# Patient Record
Sex: FEMALE | Race: WHITE | NOT HISPANIC OR LATINO | Employment: OTHER | URBAN - METROPOLITAN AREA
[De-identification: names, ages, dates, MRNs, and addresses within clinical notes are randomized per-mention and may not be internally consistent; named-entity substitution may affect disease eponyms.]

---

## 2017-03-06 ENCOUNTER — AMBULATORY - HEALTHEAST (OUTPATIENT)
Dept: PODIATRY | Facility: CLINIC | Age: 32
End: 2017-03-06

## 2017-03-06 DIAGNOSIS — L60.0 INGROWN TOENAIL: ICD-10-CM

## 2017-03-06 ASSESSMENT — MIFFLIN-ST. JEOR: SCORE: 1853.02

## 2018-02-26 ENCOUNTER — RECORDS - HEALTHEAST (OUTPATIENT)
Dept: LAB | Facility: CLINIC | Age: 33
End: 2018-02-26

## 2018-02-26 LAB — HCG SERPL-ACNC: 3976 MLU/ML (ref 0–4)

## 2018-04-16 ENCOUNTER — RECORDS - HEALTHEAST (OUTPATIENT)
Dept: ADMINISTRATIVE | Facility: OTHER | Age: 33
End: 2018-04-16

## 2018-04-16 ASSESSMENT — MIFFLIN-ST. JEOR: SCORE: 2040.79

## 2018-04-17 ENCOUNTER — ANESTHESIA - HEALTHEAST (OUTPATIENT)
Dept: SURGERY | Facility: CLINIC | Age: 33
End: 2018-04-17

## 2018-04-18 ENCOUNTER — SURGERY - HEALTHEAST (OUTPATIENT)
Dept: SURGERY | Facility: CLINIC | Age: 33
End: 2018-04-18

## 2018-04-18 ASSESSMENT — MIFFLIN-ST. JEOR: SCORE: 2040.79

## 2018-04-30 ENCOUNTER — TRANSFERRED RECORDS (OUTPATIENT)
Dept: HEALTH INFORMATION MANAGEMENT | Facility: CLINIC | Age: 33
End: 2018-04-30
Payer: COMMERCIAL

## 2018-07-09 ENCOUNTER — OFFICE VISIT - HEALTHEAST (OUTPATIENT)
Dept: FAMILY MEDICINE | Facility: CLINIC | Age: 33
End: 2018-07-09

## 2018-07-09 DIAGNOSIS — L60.0 INGROWN NAIL OF GREAT TOE OF RIGHT FOOT: ICD-10-CM

## 2018-07-09 ASSESSMENT — MIFFLIN-ST. JEOR: SCORE: 2024.46

## 2019-01-18 RX ORDER — LEVOTHYROXINE SODIUM 75 UG/1
75 TABLET ORAL DAILY
COMMUNITY
End: 2021-12-21

## 2019-01-20 ENCOUNTER — ANESTHESIA EVENT (OUTPATIENT)
Dept: SURGERY | Facility: CLINIC | Age: 34
End: 2019-01-20
Payer: COMMERCIAL

## 2019-01-20 NOTE — ANESTHESIA PREPROCEDURE EVALUATION
Anesthesia Pre-Procedure Evaluation    Patient: Cherie Montgomery   MRN: [id number] : 1985          Preoperative Diagnosis: endometrial polyp    Procedure(s):  HYSTEROSCOPY, POLYPECTOMY, POSSIBLE DILATION AND CURETTAGE (GUY NEPHSuitey MORCELLATOR AND FLUID MANAGEMENT)    Past Medical History:   Diagnosis Date     Anti-phospholipid antibody syndrome (H)      Dysmenorrhea      Obese      Past Surgical History:   Procedure Laterality Date     GYN SURGERY      D & C       Anesthesia Evaluation     . Pt has had prior anesthetic.     History of anesthetic complications (Pt has 2 prior D&C's.  It sounds as though for the 2nd one, they attempted a sedation.  It sounds as though it failed and required urgent intubation)          ROS/MED HX    ENT/Pulmonary:      (-) sleep apnea   Neurologic:       Cardiovascular:         METS/Exercise Tolerance:     Hematologic:     (+) Other Hematologic Disorder-Antiphospholipid antibody syndrome (found with 2 prior miscarriages)      Musculoskeletal:         GI/Hepatic:        (-) GERD   Renal/Genitourinary:     (+) Other Renal/ Genitourinary, Polyp, dysmenorrhea      Endo:     (+) thyroid problem hypothyroidism, Obesity (Morbid, BMI 46), .      Psychiatric:         Infectious Disease:         Malignancy:         Other:                          Physical Exam  Normal systems: cardiovascular, pulmonary and dental    Airway   Mallampati: I  TM distance: >3 FB  Neck ROM: full    Dental     Cardiovascular       Pulmonary             No results found for: WBC, HGB, HCT, PLT, CRP, SED, NA, POTASSIUM, CHLORIDE, CO2, BUN, CR, GLC, BRIEN, PHOS, MAG, ALBUMIN, PROTTOTAL, ALT, AST, GGT, ALKPHOS, BILITOTAL, BILIDIRECT, LIPASE, AMYLASE, ELISE, PTT, INR, FIBR, TSH, T4, T3, HCG, HCGS, CKTOTAL, CKMB, TROPN    Preop Vitals  BP Readings from Last 3 Encounters:   No data found for BP    Pulse Readings from Last 3 Encounters:   No data found for Pulse      Resp Readings from Last 3 Encounters:   No data found  for Resp    SpO2 Readings from Last 3 Encounters:   No data found for SpO2      Temp Readings from Last 1 Encounters:   No data found for Temp    Ht Readings from Last 1 Encounters:   No data found for Ht      Wt Readings from Last 1 Encounters:   No data found for Wt    There is no height or weight on file to calculate BMI.       Anesthesia Plan      History & Physical Review  History and physical reviewed and following examination; no interval change.    ASA Status:  3 .    NPO Status:  > 8 hours    Plan for General, RSI and ETT with Intravenous induction. Maintenance will be Balanced.    PONV prophylaxis:  Ondansetron (or other 5HT-3), Other (See comment) and Dexamethasone or Solumedrol  RSI with intubation.  Elevate head/shoulders with pillows/blankets/or ramp.  Pt says she feels like her dinner is still sitting in her stomach undigested.  That combined with morbid obesity combined with problem with prior sedation are reason for RSI/GA.    Propofol gtt, zofran, decadron      Postoperative Care  Postoperative pain management:  IV analgesics.      Consents  Anesthetic plan, risks, benefits and alternatives discussed with:  Patient..                 Alex Antonio MD

## 2019-01-21 ENCOUNTER — ANESTHESIA (OUTPATIENT)
Dept: SURGERY | Facility: CLINIC | Age: 34
End: 2019-01-21
Payer: COMMERCIAL

## 2019-01-21 ENCOUNTER — HOSPITAL ENCOUNTER (OUTPATIENT)
Facility: CLINIC | Age: 34
Discharge: HOME | End: 2019-01-21
Attending: OBSTETRICS & GYNECOLOGY | Admitting: OBSTETRICS & GYNECOLOGY
Payer: COMMERCIAL

## 2019-01-21 VITALS
WEIGHT: 277.2 LBS | TEMPERATURE: 98 F | RESPIRATION RATE: 18 BRPM | HEART RATE: 81 BPM | SYSTOLIC BLOOD PRESSURE: 121 MMHG | OXYGEN SATURATION: 95 % | DIASTOLIC BLOOD PRESSURE: 74 MMHG | HEIGHT: 64 IN | BODY MASS INDEX: 47.33 KG/M2

## 2019-01-21 DIAGNOSIS — R10.2 PELVIC PAIN: Primary | ICD-10-CM

## 2019-01-21 LAB — B-HCG SERPL-ACNC: <1 IU/L (ref 0–5)

## 2019-01-21 PROCEDURE — 84702 CHORIONIC GONADOTROPIN TEST: CPT | Performed by: ANESTHESIOLOGY

## 2019-01-21 PROCEDURE — 37000009 ZZH ANESTHESIA TECHNICAL FEE, EACH ADDTL 15 MIN: Performed by: OBSTETRICS & GYNECOLOGY

## 2019-01-21 PROCEDURE — 88305 TISSUE EXAM BY PATHOLOGIST: CPT | Performed by: OBSTETRICS & GYNECOLOGY

## 2019-01-21 PROCEDURE — 71000012 ZZH RECOVERY PHASE 1 LEVEL 1 FIRST HR: Performed by: OBSTETRICS & GYNECOLOGY

## 2019-01-21 PROCEDURE — 71000027 ZZH RECOVERY PHASE 2 EACH 15 MINS: Performed by: OBSTETRICS & GYNECOLOGY

## 2019-01-21 PROCEDURE — 88305 TISSUE EXAM BY PATHOLOGIST: CPT | Mod: 26 | Performed by: OBSTETRICS & GYNECOLOGY

## 2019-01-21 PROCEDURE — 25000566 ZZH SEVOFLURANE, EA 15 MIN: Performed by: OBSTETRICS & GYNECOLOGY

## 2019-01-21 PROCEDURE — 37000008 ZZH ANESTHESIA TECHNICAL FEE, 1ST 30 MIN: Performed by: OBSTETRICS & GYNECOLOGY

## 2019-01-21 PROCEDURE — 27210794 ZZH OR GENERAL SUPPLY STERILE: Performed by: OBSTETRICS & GYNECOLOGY

## 2019-01-21 PROCEDURE — 25000128 H RX IP 250 OP 636: Performed by: ANESTHESIOLOGY

## 2019-01-21 PROCEDURE — 36415 COLL VENOUS BLD VENIPUNCTURE: CPT | Performed by: ANESTHESIOLOGY

## 2019-01-21 PROCEDURE — 36000058 ZZH SURGERY LEVEL 3 EA 15 ADDTL MIN: Performed by: OBSTETRICS & GYNECOLOGY

## 2019-01-21 PROCEDURE — 25000128 H RX IP 250 OP 636: Performed by: NURSE ANESTHETIST, CERTIFIED REGISTERED

## 2019-01-21 PROCEDURE — 25000132 ZZH RX MED GY IP 250 OP 250 PS 637: Performed by: OBSTETRICS & GYNECOLOGY

## 2019-01-21 PROCEDURE — 36000056 ZZH SURGERY LEVEL 3 1ST 30 MIN: Performed by: OBSTETRICS & GYNECOLOGY

## 2019-01-21 PROCEDURE — 25000125 ZZHC RX 250: Performed by: NURSE ANESTHETIST, CERTIFIED REGISTERED

## 2019-01-21 PROCEDURE — 40000170 ZZH STATISTIC PRE-PROCEDURE ASSESSMENT II: Performed by: OBSTETRICS & GYNECOLOGY

## 2019-01-21 RX ORDER — OXYCODONE HYDROCHLORIDE 5 MG/1
5-10 TABLET ORAL EVERY 4 HOURS PRN
Qty: 10 TABLET | Refills: 0 | Status: SHIPPED | OUTPATIENT
Start: 2019-01-21 | End: 2019-01-24

## 2019-01-21 RX ORDER — OXYCODONE HYDROCHLORIDE 5 MG/1
5 TABLET ORAL
Status: COMPLETED | OUTPATIENT
Start: 2019-01-21 | End: 2019-01-21

## 2019-01-21 RX ORDER — FENTANYL CITRATE 50 UG/ML
25-50 INJECTION, SOLUTION INTRAMUSCULAR; INTRAVENOUS
Status: DISCONTINUED | OUTPATIENT
Start: 2019-01-21 | End: 2019-01-21 | Stop reason: HOSPADM

## 2019-01-21 RX ORDER — ONDANSETRON 2 MG/ML
INJECTION INTRAMUSCULAR; INTRAVENOUS PRN
Status: DISCONTINUED | OUTPATIENT
Start: 2019-01-21 | End: 2019-01-21

## 2019-01-21 RX ORDER — FENTANYL CITRATE 50 UG/ML
INJECTION, SOLUTION INTRAMUSCULAR; INTRAVENOUS PRN
Status: DISCONTINUED | OUTPATIENT
Start: 2019-01-21 | End: 2019-01-21

## 2019-01-21 RX ORDER — DIAZEPAM 10 MG/2ML
2.5 INJECTION, SOLUTION INTRAMUSCULAR; INTRAVENOUS
Status: DISCONTINUED | OUTPATIENT
Start: 2019-01-21 | End: 2019-01-21 | Stop reason: HOSPADM

## 2019-01-21 RX ORDER — PHENAZOPYRIDINE HYDROCHLORIDE 200 MG/1
200 TABLET, FILM COATED ORAL ONCE
Status: DISCONTINUED | OUTPATIENT
Start: 2019-01-21 | End: 2019-01-21 | Stop reason: HOSPADM

## 2019-01-21 RX ORDER — DEXAMETHASONE SODIUM PHOSPHATE 4 MG/ML
INJECTION, SOLUTION INTRA-ARTICULAR; INTRALESIONAL; INTRAMUSCULAR; INTRAVENOUS; SOFT TISSUE PRN
Status: DISCONTINUED | OUTPATIENT
Start: 2019-01-21 | End: 2019-01-21

## 2019-01-21 RX ORDER — PROPOFOL 10 MG/ML
INJECTION, EMULSION INTRAVENOUS CONTINUOUS PRN
Status: DISCONTINUED | OUTPATIENT
Start: 2019-01-21 | End: 2019-01-21

## 2019-01-21 RX ORDER — ONDANSETRON 2 MG/ML
4 INJECTION INTRAMUSCULAR; INTRAVENOUS EVERY 30 MIN PRN
Status: DISCONTINUED | OUTPATIENT
Start: 2019-01-21 | End: 2019-01-21 | Stop reason: HOSPADM

## 2019-01-21 RX ORDER — IBUPROFEN 600 MG/1
600 TABLET, FILM COATED ORAL EVERY 6 HOURS PRN
Qty: 30 TABLET | Refills: 0 | Status: SHIPPED | OUTPATIENT
Start: 2019-01-21 | End: 2019-02-20

## 2019-01-21 RX ORDER — ACETAMINOPHEN 325 MG/1
650 TABLET ORAL
Status: DISCONTINUED | OUTPATIENT
Start: 2019-01-21 | End: 2019-01-21 | Stop reason: HOSPADM

## 2019-01-21 RX ORDER — KETOROLAC TROMETHAMINE 30 MG/ML
30 INJECTION, SOLUTION INTRAMUSCULAR; INTRAVENOUS EVERY 6 HOURS PRN
Status: DISCONTINUED | OUTPATIENT
Start: 2019-01-21 | End: 2019-01-21 | Stop reason: HOSPADM

## 2019-01-21 RX ORDER — PROPOFOL 10 MG/ML
INJECTION, EMULSION INTRAVENOUS PRN
Status: DISCONTINUED | OUTPATIENT
Start: 2019-01-21 | End: 2019-01-21

## 2019-01-21 RX ORDER — KETOROLAC TROMETHAMINE 30 MG/ML
INJECTION, SOLUTION INTRAMUSCULAR; INTRAVENOUS PRN
Status: DISCONTINUED | OUTPATIENT
Start: 2019-01-21 | End: 2019-01-21

## 2019-01-21 RX ORDER — MEPERIDINE HYDROCHLORIDE 25 MG/ML
12.5 INJECTION INTRAMUSCULAR; INTRAVENOUS; SUBCUTANEOUS
Status: DISCONTINUED | OUTPATIENT
Start: 2019-01-21 | End: 2019-01-21 | Stop reason: HOSPADM

## 2019-01-21 RX ORDER — SODIUM CHLORIDE, SODIUM LACTATE, POTASSIUM CHLORIDE, CALCIUM CHLORIDE 600; 310; 30; 20 MG/100ML; MG/100ML; MG/100ML; MG/100ML
INJECTION, SOLUTION INTRAVENOUS CONTINUOUS PRN
Status: DISCONTINUED | OUTPATIENT
Start: 2019-01-21 | End: 2019-01-21

## 2019-01-21 RX ORDER — NALOXONE HYDROCHLORIDE 0.4 MG/ML
.1-.4 INJECTION, SOLUTION INTRAMUSCULAR; INTRAVENOUS; SUBCUTANEOUS
Status: DISCONTINUED | OUTPATIENT
Start: 2019-01-21 | End: 2019-01-21 | Stop reason: HOSPADM

## 2019-01-21 RX ORDER — SODIUM CHLORIDE, SODIUM LACTATE, POTASSIUM CHLORIDE, CALCIUM CHLORIDE 600; 310; 30; 20 MG/100ML; MG/100ML; MG/100ML; MG/100ML
INJECTION, SOLUTION INTRAVENOUS CONTINUOUS
Status: DISCONTINUED | OUTPATIENT
Start: 2019-01-21 | End: 2019-01-21 | Stop reason: HOSPADM

## 2019-01-21 RX ORDER — HYDROMORPHONE HYDROCHLORIDE 1 MG/ML
.3-.5 INJECTION, SOLUTION INTRAMUSCULAR; INTRAVENOUS; SUBCUTANEOUS EVERY 10 MIN PRN
Status: DISCONTINUED | OUTPATIENT
Start: 2019-01-21 | End: 2019-01-21 | Stop reason: HOSPADM

## 2019-01-21 RX ORDER — ACETAMINOPHEN 650 MG/1
650 SUPPOSITORY RECTAL EVERY 4 HOURS PRN
Status: DISCONTINUED | OUTPATIENT
Start: 2019-01-21 | End: 2019-01-21 | Stop reason: HOSPADM

## 2019-01-21 RX ORDER — FENTANYL CITRATE 50 UG/ML
25-100 INJECTION, SOLUTION INTRAMUSCULAR; INTRAVENOUS
Status: DISCONTINUED | OUTPATIENT
Start: 2019-01-21 | End: 2019-01-21 | Stop reason: HOSPADM

## 2019-01-21 RX ORDER — ACETAMINOPHEN 325 MG/1
650 TABLET ORAL EVERY 4 HOURS PRN
Qty: 50 TABLET | Refills: 0 | Status: SHIPPED | OUTPATIENT
Start: 2019-01-21 | End: 2019-02-20

## 2019-01-21 RX ORDER — ONDANSETRON 4 MG/1
4 TABLET, ORALLY DISINTEGRATING ORAL EVERY 30 MIN PRN
Status: DISCONTINUED | OUTPATIENT
Start: 2019-01-21 | End: 2019-01-21 | Stop reason: HOSPADM

## 2019-01-21 RX ORDER — LIDOCAINE HYDROCHLORIDE 20 MG/ML
INJECTION, SOLUTION INFILTRATION; PERINEURAL PRN
Status: DISCONTINUED | OUTPATIENT
Start: 2019-01-21 | End: 2019-01-21

## 2019-01-21 RX ADMIN — SODIUM CHLORIDE, POTASSIUM CHLORIDE, SODIUM LACTATE AND CALCIUM CHLORIDE: 600; 310; 30; 20 INJECTION, SOLUTION INTRAVENOUS at 09:24

## 2019-01-21 RX ADMIN — SODIUM CHLORIDE, POTASSIUM CHLORIDE, SODIUM LACTATE AND CALCIUM CHLORIDE: 600; 310; 30; 20 INJECTION, SOLUTION INTRAVENOUS at 08:34

## 2019-01-21 RX ADMIN — FENTANYL CITRATE 50 MCG: 50 INJECTION, SOLUTION INTRAMUSCULAR; INTRAVENOUS at 09:13

## 2019-01-21 RX ADMIN — MIDAZOLAM 2 MG: 1 INJECTION INTRAMUSCULAR; INTRAVENOUS at 08:34

## 2019-01-21 RX ADMIN — LIDOCAINE HYDROCHLORIDE 100 MG: 20 INJECTION, SOLUTION INFILTRATION; PERINEURAL at 08:38

## 2019-01-21 RX ADMIN — FENTANYL CITRATE 50 MCG: 50 INJECTION, SOLUTION INTRAMUSCULAR; INTRAVENOUS at 09:20

## 2019-01-21 RX ADMIN — PROPOFOL 50 MG: 10 INJECTION, EMULSION INTRAVENOUS at 08:42

## 2019-01-21 RX ADMIN — OXYCODONE HYDROCHLORIDE 5 MG: 5 TABLET ORAL at 09:46

## 2019-01-21 RX ADMIN — KETOROLAC TROMETHAMINE 30 MG: 30 INJECTION, SOLUTION INTRAMUSCULAR at 08:51

## 2019-01-21 RX ADMIN — DEXAMETHASONE SODIUM PHOSPHATE 4 MG: 4 INJECTION, SOLUTION INTRA-ARTICULAR; INTRALESIONAL; INTRAMUSCULAR; INTRAVENOUS; SOFT TISSUE at 08:45

## 2019-01-21 RX ADMIN — SUCCINYLCHOLINE CHLORIDE 130 MG: 20 INJECTION, SOLUTION INTRAMUSCULAR; INTRAVENOUS; PARENTERAL at 08:38

## 2019-01-21 RX ADMIN — ONDANSETRON 4 MG: 2 INJECTION INTRAMUSCULAR; INTRAVENOUS at 08:45

## 2019-01-21 RX ADMIN — DEXMEDETOMIDINE HYDROCHLORIDE 12 MCG: 100 INJECTION, SOLUTION INTRAVENOUS at 08:44

## 2019-01-21 RX ADMIN — PROPOFOL 200 MG: 10 INJECTION, EMULSION INTRAVENOUS at 08:38

## 2019-01-21 RX ADMIN — PROPOFOL 35 MCG/KG/MIN: 10 INJECTION, EMULSION INTRAVENOUS at 08:41

## 2019-01-21 RX ADMIN — PROPOFOL 50 MG: 10 INJECTION, EMULSION INTRAVENOUS at 08:43

## 2019-01-21 RX ADMIN — FENTANYL CITRATE 100 MCG: 50 INJECTION, SOLUTION INTRAMUSCULAR; INTRAVENOUS at 08:38

## 2019-01-21 ASSESSMENT — MIFFLIN-ST. JEOR: SCORE: 1952.37

## 2019-01-21 NOTE — ANESTHESIA CARE TRANSFER NOTE
Patient: Cherie Montgomery    Procedure(s):  HYSTEROSCOPY, POLYPECTOMY, POSSIBLE DILATION AND CURETTAGE (GUY NEPHEW MORCELLATOR AND FLUID MANAGEMENT)    Diagnosis: endometrial polyp  Diagnosis Additional Information: No value filed.    Anesthesia Type:   General, RSI, ETT     Note:  Airway :Face Mask  Patient transferred to:PACU  Comments: TOF 4/4, spontaneous respirations, adequate tidal volumes, followed commands to voice, oropharynx suctioned with soft flexible catheter, extubated atraumatically, extubated with suction, airway patent after extubation.  Oxygen via facemask at 8 liters per minute to PACU. Oxygen tubing connected to wall O2 in PACU, SpO2, NiBP, and EKG monitors and alarms on and functioning, Nida Hugger warmer connected to patient gown, report on patient's clinical status given to PACU RN, RN questions answered. Handoff Report: Identifed the Patient, Identified the Reponsible Provider, Reviewed the pertinent medical history, Discussed the surgical course, Reviewed Intra-OP anesthesia mangement and issues during anesthesia, Set expectations for post-procedure period and Allowed opportunity for questions and acknowledgement of understanding      Vitals: (Last set prior to Anesthesia Care Transfer)    CRNA VITALS  1/21/2019 0835 - 1/21/2019 0908      1/21/2019             Resp Rate (set):  10                Electronically Signed By: JANEY Lakhani CRNA  January 21, 2019  9:08 AM

## 2019-01-21 NOTE — DISCHARGE INSTRUCTIONS
HOME CARE FOLLOWING HYSTEROSCOPY    Diet  You have no restrictions on your diet.  During the evening following surgery, drink plenty of fluids and eat a light supper.    Nausea  The anesthesia may produce some nausea.  If you feel nauseated, stay in bed and try drinking fluids such as 7-Up, tea, or soup.    Discomfort  The amount of discomfort you can expect is very unpredictable.  If you have pain that cannot be controlled with Tylenol or with the prescription you may have received, you should notify your physician.  Abdominal cramping or low backache is not uncommon and should not be a cause for concern.  You will be drowsy and weak the day of surgery and possibly the following day.  Fever  A low grade fever (not over 100 F) is usual after this procedure.  Do not hesitate to notify your physician if your fever seems excessive or persists.    Activity   You may resume your normal activity.  Avoid heavy lifting for one week.  You may bathe or shower.  Do not douche, use tampons, or resume intercourse until the bleeding has ceased.    Emergency Care  Contact your physician if you have any of these problems:   1.  A fever over 100 F   2.  A large amount of bleeding or drainage   3.  Severe pain       Same Day Surgery Discharge Instructions for  Sedation and General Anesthesia       It's not unusual to feel dizzy, light-headed or faint for up to 24 hours after surgery or while taking pain medication.  If you have these symptoms: sit for a few minutes before standing and have someone assist you when you get up to walk or use the bathroom.      You should rest and relax for the next 24 hours. We recommend you make arrangements to have an adult stay with you for at least 24 hours after your discharge.  Avoid hazardous and strenuous activity.      DO NOT DRIVE any vehicle or operate mechanical equipment for 24 hours following the end of your surgery.  Even though you may feel normal, your reactions may be affected by the  medication you have received.      Do not drink alcoholic beverages for 24 hours following surgery.       Slowly progress to your regular diet as you feel able. It's not unusual to feel nauseated and/or vomit after receiving anesthesia.  If you develop these symptoms, drink clear liquids (apple juice, ginger ale, broth, 7-up, etc. ) until you feel better.  If your nausea and vomiting persists for 24 hours, please notify your surgeon.        All narcotic pain medications, along with inactivity and anesthesia, can cause constipation. Drinking plenty of liquids and increasing fiber intake will help.      For any questions of a medical nature, call your surgeon.      Do not make important decisions for 24 hours.      If you had general anesthesia, you may have a sore throat for a couple of days related to the breathing tube used during surgery.  You may use Cepacol lozenges to help with this discomfort.  If it worsens or if you develop a fever, contact your surgeon.       If you feel your pain is not well managed with the pain medications prescribed by your surgeon, please contact your surgeon's office to let them know so they can address your concerns.           While you were at the hospital today you received Toradol, an antiinflammatory medication similar to Ibuprofen.  You should not take other antiinflammatory medication, such as Ibuprofen, Motrin, Advil, Aleve, Naprosyn, etc, until 3  PM on 1/21/19.     **If you have questions or concerns about your procedure,  call Dr. Rm at [phone]**

## 2019-01-21 NOTE — OR NURSING
PNDS met, po per I&O sheet. Pt Cherie Montgomery dressed, up in recliner and transported to Phase 2.

## 2019-01-21 NOTE — ANESTHESIA POSTPROCEDURE EVALUATION
Patient: Cherie Montgomery    Procedure(s):  HYSTEROSCOPY, POLYPECTOMY, POSSIBLE DILATION AND CURETTAGE (GUY NEPHEW MORCELLATOR AND FLUID MANAGEMENT)    Diagnosis:endometrial polyp  Diagnosis Additional Information: No value filed.    Anesthesia Type:  General, RSI, ETT    Note:  Anesthesia Post Evaluation    Patient location during evaluation: PACU  Patient participation: Able to fully participate in evaluation  Level of consciousness: awake  Pain management: adequate  Airway patency: patent  Cardiovascular status: acceptable  Respiratory status: acceptable  Hydration status: acceptable  PONV: controlled     Anesthetic complications: None          Last vitals:  Vitals:    01/21/19 0920 01/21/19 0930 01/21/19 1045   BP:  116/70 121/74   Pulse:  81    Resp: 22 18 18   Temp:  36.7  C (98  F)    SpO2: 97% 94% 95%         Electronically Signed By: Alex Antonio MD  January 21, 2019  1:29 PM

## 2019-01-21 NOTE — OP NOTE
Operative Note  Date of Service: 1/21/2019    Pre-operative diagnosis:  1. Pelvic pain  2. Suspected endometrial polyp    Post-operative diagnosis:  1. Same as above with diagnosis of endometrial polyp    Surgeon:  Katia Rm MD    Assistant:  None    Procedures:  1. Operative hysteroscopy  2. Polypectomy with rubi and nephew morcellator    Anesthesia:  General    Fluids:  Distending media: normal saline  Deficit: 60 ml    EBL: 5 mL    Complications: None    Specimens:   1. Endometrial polyp    Implants/device:  None    Procedure:  Patient was taken to the OR where MAC anesthesia was initiated.  Once patient was comfortable she was positioned in the dorsal lithotomy position with her legs up in the yellow fin stirrups.  She was then prepped and draped in the normal sterile fashion.  Straight catheterization of the bladder was done with 300 mL of clear urine returned.  A presurgical pause was undertaken and the proper patient and procedure were identified.  A sterile speculum was placed in the vagina and the cervix was visualized. A single toothed tenaculum was placed on the anterior lip. The cervix was then progressively dilated with hegar dilators to 7 mm.  The hysteroscope was then passed easily through the cervical os and into the uterine cavity.  The uterine cavity was distended with normal saline. A large stringy polyp was noted filling the lower uterine segment measuring 1.5 cm.  The right and left ostia were visualized.  The rubi and nephew morcellator was inserted.  The base of the intracavitary lesion was identified and was easily removed with the morcellator to the uterine lining.  Tissue removed was sent to pathology.  The hysteroscope and morcellator were then removed.  The tenaculum was removed and hemostasis was achieved with pressure.  The speculum was then removed.  Sharps and emery counts correct times two.  Specimens mentioned above were sent to pathology for definitive diagnosis.    Katia  RADHA Rm MD  1/21/2019  9:32 AM

## 2019-01-22 LAB — COPATH REPORT: NORMAL

## 2019-03-15 ENCOUNTER — COMMUNICATION - HEALTHEAST (OUTPATIENT)
Dept: FAMILY MEDICINE | Facility: CLINIC | Age: 34
End: 2019-03-15

## 2019-03-18 ENCOUNTER — COMMUNICATION - HEALTHEAST (OUTPATIENT)
Dept: TELEHEALTH | Facility: CLINIC | Age: 34
End: 2019-03-18

## 2019-03-18 ENCOUNTER — AMBULATORY - HEALTHEAST (OUTPATIENT)
Dept: LAB | Facility: CLINIC | Age: 34
End: 2019-03-18

## 2019-03-18 DIAGNOSIS — E03.9 HYPOTHYROIDISM: ICD-10-CM

## 2019-03-18 LAB
T4 FREE SERPL-MCNC: 1.1 NG/DL (ref 0.7–1.8)
TSH SERPL DL<=0.005 MIU/L-ACNC: 1.84 UIU/ML (ref 0.3–5)

## 2019-07-19 ENCOUNTER — COMMUNICATION - HEALTHEAST (OUTPATIENT)
Dept: LAB | Facility: CLINIC | Age: 34
End: 2019-07-19

## 2019-07-19 ENCOUNTER — AMBULATORY - HEALTHEAST (OUTPATIENT)
Dept: LAB | Facility: CLINIC | Age: 34
End: 2019-07-19

## 2019-07-19 DIAGNOSIS — E03.9 HYPOTHYROIDISM: ICD-10-CM

## 2019-07-22 ENCOUNTER — OFFICE VISIT - HEALTHEAST (OUTPATIENT)
Dept: FAMILY MEDICINE | Facility: CLINIC | Age: 34
End: 2019-07-22

## 2019-07-22 ENCOUNTER — COMMUNICATION - HEALTHEAST (OUTPATIENT)
Dept: TELEHEALTH | Facility: CLINIC | Age: 34
End: 2019-07-22

## 2019-07-22 DIAGNOSIS — E03.9 HYPOTHYROIDISM: ICD-10-CM

## 2019-07-22 DIAGNOSIS — E03.9 HYPOTHYROIDISM, UNSPECIFIED TYPE: ICD-10-CM

## 2019-07-22 LAB
T4 FREE SERPL-MCNC: 1 NG/DL (ref 0.7–1.8)
TSH SERPL DL<=0.005 MIU/L-ACNC: 2.47 UIU/ML (ref 0.3–5)

## 2019-12-16 LAB
ABO + RH BLD: NORMAL
ABO + RH BLD: NORMAL
BLD GP AB SCN SERPL QL: NORMAL
HBV SURFACE AG SERPL QL IA: NORMAL
HIV 1+2 AB+HIV1 P24 AG SERPL QL IA: NORMAL
RUBELLA ANTIBODY IGG QUANTITATIVE: NORMAL IU/ML
TREPONEMA ANTIBODIES: NORMAL

## 2020-01-06 ENCOUNTER — MEDICAL CORRESPONDENCE (OUTPATIENT)
Dept: HEALTH INFORMATION MANAGEMENT | Facility: CLINIC | Age: 35
End: 2020-01-06

## 2020-01-06 ENCOUNTER — TRANSFERRED RECORDS (OUTPATIENT)
Dept: HEALTH INFORMATION MANAGEMENT | Facility: CLINIC | Age: 35
End: 2020-01-06

## 2020-01-10 ENCOUNTER — TRANSCRIBE ORDERS (OUTPATIENT)
Dept: MATERNAL FETAL MEDICINE | Facility: CLINIC | Age: 35
End: 2020-01-10

## 2020-01-10 DIAGNOSIS — O26.90 PREGNANCY RELATED CONDITION: Primary | ICD-10-CM

## 2020-02-03 ENCOUNTER — PRE VISIT (OUTPATIENT)
Dept: MATERNAL FETAL MEDICINE | Facility: CLINIC | Age: 35
End: 2020-02-03

## 2020-02-10 ENCOUNTER — OFFICE VISIT (OUTPATIENT)
Dept: MATERNAL FETAL MEDICINE | Facility: CLINIC | Age: 35
End: 2020-02-10
Attending: OBSTETRICS & GYNECOLOGY
Payer: COMMERCIAL

## 2020-02-10 ENCOUNTER — HOSPITAL ENCOUNTER (OUTPATIENT)
Dept: LAB | Facility: CLINIC | Age: 35
End: 2020-02-10
Attending: OBSTETRICS & GYNECOLOGY
Payer: COMMERCIAL

## 2020-02-10 ENCOUNTER — HOSPITAL ENCOUNTER (OUTPATIENT)
Dept: ULTRASOUND IMAGING | Facility: CLINIC | Age: 35
Discharge: HOME | End: 2020-02-10
Attending: OBSTETRICS & GYNECOLOGY | Admitting: OBSTETRICS & GYNECOLOGY
Payer: COMMERCIAL

## 2020-02-10 DIAGNOSIS — O26.90 PREGNANCY RELATED CONDITION: ICD-10-CM

## 2020-02-10 DIAGNOSIS — Z36.82 ENCOUNTER FOR NUCHAL TRANSLUCENCY TESTING: Primary | ICD-10-CM

## 2020-02-10 DIAGNOSIS — Z36.9 FIRST TRIMESTER SCREENING: ICD-10-CM

## 2020-02-10 DIAGNOSIS — Z36.9 FIRST TRIMESTER SCREENING: Primary | ICD-10-CM

## 2020-02-10 PROCEDURE — 84704 HCG FREE BETACHAIN TEST: CPT | Performed by: OBSTETRICS & GYNECOLOGY

## 2020-02-10 PROCEDURE — 76813 OB US NUCHAL MEAS 1 GEST: CPT

## 2020-02-10 PROCEDURE — 36415 COLL VENOUS BLD VENIPUNCTURE: CPT | Performed by: OBSTETRICS & GYNECOLOGY

## 2020-02-10 PROCEDURE — 84163 PAPPA SERUM: CPT | Performed by: OBSTETRICS & GYNECOLOGY

## 2020-02-10 PROCEDURE — 82105 ALPHA-FETOPROTEIN SERUM: CPT | Performed by: OBSTETRICS & GYNECOLOGY

## 2020-02-10 PROCEDURE — 96040 ZZH GENETIC COUNSELING, EACH 30 MINUTES: CPT | Mod: ZF | Performed by: GENETIC COUNSELOR, MS

## 2020-02-10 NOTE — PROGRESS NOTES
[facility] [place]  Genetic Counseling Consult    Patient: Cherie Montgomery YOB: 1985   Date of Service: 2/10/20      Cherie Montgomery was seen at [facility] for genetic consultation to discuss the options for routine screening for fetal chromosome abnormalities. She was unaccompanied to today's visit.      Impression/Plan:   1.  Cherie had an ultrasound and blood draw for first trimester screening.  Results are expected within 5-7 days, and will be available in EPIC.  We will contact her to discuss the results, and a copy will be forwarded to the office of the referring OB provider. She requests a detailed voice message be left at [phone] if she is unavailable.    2. Maternal serum AFP (single marker screen) is recommended after 15 weeks to screen for open neural tube defects. A quad screen should not be performed.    Pregnancy History:   /Parity:    Age at Delivery: 35 year old  ASHA: 2020, by Last Menstrual Period  Gestational Age: 13w3d    No significant complications or exposures were reported in the current pregnancy.    Cherie lewis pregnancy history is significant for:  o Blighted ovum  o 2016: SAB a 6 weeks  o 2018: SAB at 12 weeks, D&C    Medical History:   Cherie lewis reported medical history is not expected to impact pregnancy management or risks to fetal development.       Family History:   A three-generation pedigree was obtained, and is scanned under the  Media  tab.   The following significant findings were reported by Cherie:    Cherie has limited family history information regarding her biological paternal side of the family.     Cherie reports a maternal family history of mental health conditions.    Otherwise, the reported family history is negative for multiple miscarriages, stillbirths, birth defects, mental retardation, known genetic conditions, and consanguinity.       Carrier Screening:    The patient reports that she and the father of the pregnancy have  ancestry:      Cystic fibrosis is an autosomal recessive genetic condition that occurs with increased frequency in individuals of  ancestry and carrier screening for this condition is available.  In addition,  screening in the [facility] includes cystic fibrosis.      Expanded carrier screening for mutations in a large panel of genes associated with autosomal recessive conditions including cystic fibrosis, spinal muscular atrophy, and others, is now available.      The patient has had previous expanded carrier screening via [employer]. 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 Cherie reported completing genetic counseling over the phone after receiving this result. We briefly reviewed these conditions today as well as the option of Cherie's partner completing carrier screening for these conditions to better determine reproductive risk. Cherie currently declines this option.       Risk Assessment for Chromosome Conditions:   We explained that the risk for fetal chromosome abnormalities increases with maternal age. We discussed specific features of common chromosome abnormalities, including Down syndrome, trisomy 13, trisomy 18, and sex chromosome trisomies.      - At age 35 at midtrimester, the risk to have a baby with Down syndrome is 1 in 342.     - At age 35 at midtrimester, the risk to have a baby with any chromosome abnormality is 1 in  172.        Testing Options:   We discussed the following options:   First trimester screening    First trimester ultrasound with nuchal translucency and nasal bone assessments, maternal plasma hCG, HOSSEIN-A, and AFP measurement    Screens for fetal trisomy 21, trisomy 13, and trisomy 18    Cannot screen for open neural tube defects; maternal serum AFP after 15 weeks is  recommended     Non-invasive Prenatal Testing (NIPT)    Maternal plasma cell-free DNA testing; first trimester ultrasound with nuchal translucency and nasal bone assessment is recommended, when appropriate    Screens for fetal trisomy 21, trisomy 13, trisomy 18, and sex chromosome aneuploidy    Cannot screen for open neural tube defects; maternal serum AFP after 15 weeks is recommended      We reviewed the benefits and limitations of this testing.  Screening tests provide a risk assessment specific to the pregnancy for certain fetal chromosome abnormalities, but cannot definitively diagnose or exclude a fetal chromosome abnormality.  Follow-up genetic counseling and consideration of diagnostic testing is recommended with any abnormal screening result.      It was a pleasure to be involved with Cherie s care. Face-to-face time of the meeting was 45 minutes.    Bianca Holt MS, [facility]  Maternal Fetal Medicine  [facility]  Ph: [phone]  [email or web address]

## 2020-02-10 NOTE — PROGRESS NOTES
Please see ultrasound report under imaging tab for details on ultrasound performed today.    Cara Ovalle MD  , OB/GYN  Maternal-Fetal Medicine  [email or web address]  [phone] (Academic office)  [phone] (Pager)

## 2020-02-13 ENCOUNTER — TELEPHONE (OUTPATIENT)
Dept: MATERNAL FETAL MEDICINE | Facility: CLINIC | Age: 35
End: 2020-02-13

## 2020-02-13 NOTE — TELEPHONE ENCOUNTER
I left a message for Cherie today regarding her normal first trimester screen results. Specifically, the chance this pregnancy has Down syndrome was reduced from her age related risk of 1 in 368 to less than 1 in 10,000. Similarly, the chance this pregnancy has trisomy 18/trisomy 13 was reduced from her age related risk of 1 in 701 to less than 1 in 10,000. This result significantly reduces the chance this pregnancy has Down syndrome, trisomy 18, or trisomy 13.     We discussed that these results are very reassuring, but there remains a small chance for a false positive or false negative result. This screen also does not address all chromosome abnormalities or all causes of birth defects and cognitive delay. As such, Cherie will still have the option of the Innatal screen and/or diagnostic testing (CVS or amniocentesis) if she is interested or there is an indication later in the pregnancy. She also has the option of having a maternal serum AFP blood draw after 15 weeks to screen for ONTD; she is encouraged to discuss this option with her primary OB provider. Cherie is also scheduled to return for a comprehensive ultrasound on 3/16.     I encouraged her to contact me if she has any questions in the future. A copy of this note and her first trimester screen results will be forwarded to her primary OB provider.    Bianca Holt MS, [facility]  Maternal Fetal Medicine  [facility]  Ph: [phone]  [email or web address]

## 2020-02-18 LAB — LAB SCANNED RESULT: NORMAL

## 2020-03-16 ENCOUNTER — OFFICE VISIT (OUTPATIENT)
Dept: MATERNAL FETAL MEDICINE | Facility: CLINIC | Age: 35
End: 2020-03-16
Attending: OBSTETRICS & GYNECOLOGY
Payer: COMMERCIAL

## 2020-03-16 ENCOUNTER — HOSPITAL ENCOUNTER (OUTPATIENT)
Dept: ULTRASOUND IMAGING | Facility: CLINIC | Age: 35
End: 2020-03-16
Attending: OBSTETRICS & GYNECOLOGY
Payer: COMMERCIAL

## 2020-03-16 DIAGNOSIS — O26.90 PREGNANCY RELATED CONDITION: ICD-10-CM

## 2020-03-16 DIAGNOSIS — O99.212 OBESITY AFFECTING PREGNANCY IN SECOND TRIMESTER: ICD-10-CM

## 2020-03-16 DIAGNOSIS — D68.61 ANTIPHOSPHOLIPID ANTIBODY SYNDROME (H): Primary | ICD-10-CM

## 2020-03-16 PROCEDURE — 76811 OB US DETAILED SNGL FETUS: CPT

## 2020-03-22 NOTE — PROGRESS NOTES
Please see ultrasound report under imaging tab for details on ultrasound performed today.    Cara Ovalle MD  , OB/GYN  Maternal-Fetal Medicine  [email or web address]  [phone] (Academic office)  [phone] (Pager)

## 2020-04-03 ENCOUNTER — TELEPHONE (OUTPATIENT)
Dept: MATERNAL FETAL MEDICINE | Facility: CLINIC | Age: 35
End: 2020-04-03

## 2020-04-03 NOTE — TELEPHONE ENCOUNTER
Cherie had called clinic scheduler earlier this morning inquiring if her next appointment is needed. Patient is inquiring d/t the current situation with COVID [place]. Spoke with patient after reviewing recent ultrasounds/chart with MD. Advised patient that Dr. Izquierdo recommends she keep her appointment to clear anatomy not seen previously. Patient conflicted and feels she would better be able to make her decision with more information explained from her last ultrasound. Dr. Izquierdo spoke with patient and patient plans to keep her appointment as scheduled at this point.

## 2020-04-13 ENCOUNTER — OFFICE VISIT (OUTPATIENT)
Dept: MATERNAL FETAL MEDICINE | Facility: CLINIC | Age: 35
End: 2020-04-13
Attending: OBSTETRICS & GYNECOLOGY
Payer: COMMERCIAL

## 2020-04-13 ENCOUNTER — HOSPITAL ENCOUNTER (OUTPATIENT)
Dept: ULTRASOUND IMAGING | Facility: CLINIC | Age: 35
End: 2020-04-13
Attending: OBSTETRICS & GYNECOLOGY
Payer: COMMERCIAL

## 2020-04-13 DIAGNOSIS — O35.9XX0 FETAL ABNORMALITY AFFECTING MANAGEMENT OF MOTHER, ANTEPARTUM, SINGLE OR UNSPECIFIED FETUS: ICD-10-CM

## 2020-04-13 DIAGNOSIS — D68.61 ANTIPHOSPHOLIPID ANTIBODY SYNDROME (H): Primary | ICD-10-CM

## 2020-04-13 PROCEDURE — 76816 OB US FOLLOW-UP PER FETUS: CPT

## 2020-04-13 NOTE — PROGRESS NOTES
Please see full imaging report from [employer] program under imaging tab.      Colin Marie MD  Maternal Fetal Medicine

## 2020-04-27 ENCOUNTER — TRANSFERRED RECORDS (OUTPATIENT)
Dept: HEALTH INFORMATION MANAGEMENT | Facility: CLINIC | Age: 35
End: 2020-04-27

## 2020-04-27 ENCOUNTER — TRANSCRIBE ORDERS (OUTPATIENT)
Dept: MATERNAL FETAL MEDICINE | Facility: CLINIC | Age: 35
End: 2020-04-27

## 2020-04-27 ENCOUNTER — MEDICAL CORRESPONDENCE (OUTPATIENT)
Dept: HEALTH INFORMATION MANAGEMENT | Facility: CLINIC | Age: 35
End: 2020-04-27

## 2020-04-27 DIAGNOSIS — O26.90 PREGNANCY RELATED CONDITION: Primary | ICD-10-CM

## 2020-04-28 ENCOUNTER — OFFICE VISIT (OUTPATIENT)
Dept: MATERNAL FETAL MEDICINE | Facility: CLINIC | Age: 35
End: 2020-04-28
Attending: OBSTETRICS & GYNECOLOGY
Payer: COMMERCIAL

## 2020-04-28 ENCOUNTER — HOSPITAL ENCOUNTER (OUTPATIENT)
Dept: ULTRASOUND IMAGING | Facility: CLINIC | Age: 35
End: 2020-04-28
Attending: OBSTETRICS & GYNECOLOGY
Payer: COMMERCIAL

## 2020-04-28 DIAGNOSIS — O26.90 PREGNANCY RELATED CONDITION: ICD-10-CM

## 2020-04-28 DIAGNOSIS — O35.9XX0 FETAL ABNORMALITY AFFECTING MANAGEMENT OF MOTHER, ANTEPARTUM, SINGLE OR UNSPECIFIED FETUS: Primary | ICD-10-CM

## 2020-04-28 PROCEDURE — 76816 OB US FOLLOW-UP PER FETUS: CPT

## 2020-04-28 NOTE — PROGRESS NOTES
"Please see \"Imaging\" tab under Chart Review for full details.    Therese Márquez MD  Maternal Fetal Medicine    "

## 2020-07-20 LAB — GROUP B STREP PCR: POSITIVE

## 2020-08-03 DIAGNOSIS — Z20.822 COVID-19 RULED OUT: ICD-10-CM

## 2020-08-03 DIAGNOSIS — Z33.1 PREGNANCY, INCIDENTAL: Primary | ICD-10-CM

## 2020-08-07 ENCOUNTER — AMBULATORY - HEALTHEAST (OUTPATIENT)
Dept: FAMILY MEDICINE | Facility: CLINIC | Age: 35
End: 2020-08-07

## 2020-08-07 DIAGNOSIS — Z33.1 PREGNANCY, INCIDENTAL: ICD-10-CM

## 2020-08-07 DIAGNOSIS — Z20.822 COVID-19 RULED OUT: ICD-10-CM

## 2020-08-09 ENCOUNTER — AMBULATORY - HEALTHEAST (OUTPATIENT)
Dept: FAMILY MEDICINE | Facility: CLINIC | Age: 35
End: 2020-08-09

## 2020-08-09 DIAGNOSIS — Z33.1 PREGNANCY, INCIDENTAL: ICD-10-CM

## 2020-08-09 DIAGNOSIS — Z20.822 COVID-19 RULED OUT: ICD-10-CM

## 2020-08-09 LAB
SARS-COV-2 PCR COMMENT: NORMAL
SARS-COV-2 RNA SPEC QL NAA+PROBE: NEGATIVE
SARS-COV-2 VIRUS SPECIMEN SOURCE: NORMAL

## 2020-08-10 ENCOUNTER — COMMUNICATION - HEALTHEAST (OUTPATIENT)
Dept: SCHEDULING | Facility: CLINIC | Age: 35
End: 2020-08-10

## 2020-08-11 ENCOUNTER — COMMUNICATION - HEALTHEAST (OUTPATIENT)
Dept: SCHEDULING | Facility: CLINIC | Age: 35
End: 2020-08-11

## 2020-08-12 ENCOUNTER — ANESTHESIA (OUTPATIENT)
Dept: OBGYN | Facility: CLINIC | Age: 35
End: 2020-08-12
Payer: COMMERCIAL

## 2020-08-12 ENCOUNTER — ANESTHESIA EVENT (OUTPATIENT)
Dept: OBGYN | Facility: CLINIC | Age: 35
End: 2020-08-12
Payer: COMMERCIAL

## 2020-08-12 ENCOUNTER — COMMUNICATION - HEALTHEAST (OUTPATIENT)
Dept: SCHEDULING | Facility: CLINIC | Age: 35
End: 2020-08-12

## 2020-08-12 ENCOUNTER — HOSPITAL ENCOUNTER (INPATIENT)
Facility: CLINIC | Age: 35
LOS: 3 days | Discharge: HOME | End: 2020-08-15
Attending: OBSTETRICS & GYNECOLOGY | Admitting: OBSTETRICS & GYNECOLOGY
Payer: COMMERCIAL

## 2020-08-12 LAB
ABO + RH BLD: NORMAL
ABO + RH BLD: NORMAL
ALT SERPL W P-5'-P-CCNC: 16 U/L (ref 0–50)
APTT PPP: 30 SEC (ref 22–37)
AST SERPL W P-5'-P-CCNC: 15 U/L (ref 0–45)
BASOPHILS # BLD AUTO: 0 10E9/L (ref 0–0.2)
BASOPHILS NFR BLD AUTO: 0.2 %
BLD GP AB SCN SERPL QL: NORMAL
BLOOD BANK CMNT PATIENT-IMP: NORMAL
BUN SERPL-MCNC: 9 MG/DL (ref 7–30)
CREAT SERPL-MCNC: 0.6 MG/DL (ref 0.52–1.04)
DIFFERENTIAL METHOD BLD: NORMAL
EOSINOPHIL # BLD AUTO: 0.1 10E9/L (ref 0–0.7)
EOSINOPHIL NFR BLD AUTO: 0.7 %
ERYTHROCYTE [DISTWIDTH] IN BLOOD BY AUTOMATED COUNT: 12.7 % (ref 10–15)
GFR SERPL CREATININE-BSD FRML MDRD: >90 ML/MIN/{1.73_M2}
HCT VFR BLD AUTO: 36.6 % (ref 35–47)
HGB BLD-MCNC: 12.4 G/DL (ref 11.7–15.7)
IMM GRANULOCYTES # BLD: 0 10E9/L (ref 0–0.4)
IMM GRANULOCYTES NFR BLD: 0.3 %
INR PPP: 0.99 (ref 0.86–1.14)
LYMPHOCYTES # BLD AUTO: 1.2 10E9/L (ref 0.8–5.3)
LYMPHOCYTES NFR BLD AUTO: 13.5 %
MCH RBC QN AUTO: 30.9 PG (ref 26.5–33)
MCHC RBC AUTO-ENTMCNC: 33.9 G/DL (ref 31.5–36.5)
MCV RBC AUTO: 91 FL (ref 78–100)
MONOCYTES # BLD AUTO: 0.7 10E9/L (ref 0–1.3)
MONOCYTES NFR BLD AUTO: 7.9 %
NEUTROPHILS # BLD AUTO: 6.9 10E9/L (ref 1.6–8.3)
NEUTROPHILS NFR BLD AUTO: 77.4 %
NRBC # BLD AUTO: 0 10*3/UL
NRBC BLD AUTO-RTO: 0 /100
PLATELET # BLD AUTO: 241 10E9/L (ref 150–450)
RBC # BLD AUTO: 4.01 10E12/L (ref 3.8–5.2)
RUPTURE OF FETAL MEMBRANES BY ROM PLUS: POSITIVE
SPECIMEN EXP DATE BLD: NORMAL
URATE SERPL-MCNC: 4.2 MG/DL (ref 2.6–6)
WBC # BLD AUTO: 8.9 10E9/L (ref 4–11)

## 2020-08-12 PROCEDURE — 36415 COLL VENOUS BLD VENIPUNCTURE: CPT | Performed by: OBSTETRICS & GYNECOLOGY

## 2020-08-12 PROCEDURE — 86780 TREPONEMA PALLIDUM: CPT | Performed by: OBSTETRICS & GYNECOLOGY

## 2020-08-12 PROCEDURE — 84460 ALANINE AMINO (ALT) (SGPT): CPT | Performed by: OBSTETRICS & GYNECOLOGY

## 2020-08-12 PROCEDURE — 84520 ASSAY OF UREA NITROGEN: CPT | Performed by: OBSTETRICS & GYNECOLOGY

## 2020-08-12 PROCEDURE — 85025 COMPLETE CBC W/AUTO DIFF WBC: CPT | Performed by: OBSTETRICS & GYNECOLOGY

## 2020-08-12 PROCEDURE — 84112 EVAL AMNIOTIC FLUID PROTEIN: CPT | Performed by: OBSTETRICS & GYNECOLOGY

## 2020-08-12 PROCEDURE — 25000128 H RX IP 250 OP 636: Performed by: OBSTETRICS & GYNECOLOGY

## 2020-08-12 PROCEDURE — 37000011 ZZH ANESTHESIA WARD SERVICE

## 2020-08-12 PROCEDURE — 25000132 ZZH RX MED GY IP 250 OP 250 PS 637: Performed by: OBSTETRICS & GYNECOLOGY

## 2020-08-12 PROCEDURE — 25000128 H RX IP 250 OP 636: Performed by: ANESTHESIOLOGY

## 2020-08-12 PROCEDURE — 3E0R3BZ INTRODUCTION OF ANESTHETIC AGENT INTO SPINAL CANAL, PERCUTANEOUS APPROACH: ICD-10-PCS | Performed by: ANESTHESIOLOGY

## 2020-08-12 PROCEDURE — 82565 ASSAY OF CREATININE: CPT | Performed by: OBSTETRICS & GYNECOLOGY

## 2020-08-12 PROCEDURE — 85730 THROMBOPLASTIN TIME PARTIAL: CPT | Performed by: OBSTETRICS & GYNECOLOGY

## 2020-08-12 PROCEDURE — 59025 FETAL NON-STRESS TEST: CPT

## 2020-08-12 PROCEDURE — 12000000 ZZH R&B MED SURG/OB

## 2020-08-12 PROCEDURE — 25800030 ZZH RX IP 258 OP 636: Performed by: OBSTETRICS & GYNECOLOGY

## 2020-08-12 PROCEDURE — G0463 HOSPITAL OUTPT CLINIC VISIT: HCPCS | Mod: 25

## 2020-08-12 PROCEDURE — 84450 TRANSFERASE (AST) (SGOT): CPT | Performed by: OBSTETRICS & GYNECOLOGY

## 2020-08-12 PROCEDURE — 86900 BLOOD TYPING SEROLOGIC ABO: CPT | Performed by: OBSTETRICS & GYNECOLOGY

## 2020-08-12 PROCEDURE — 25000125 ZZHC RX 250: Performed by: ANESTHESIOLOGY

## 2020-08-12 PROCEDURE — 85610 PROTHROMBIN TIME: CPT | Performed by: OBSTETRICS & GYNECOLOGY

## 2020-08-12 PROCEDURE — 00HU33Z INSERTION OF INFUSION DEVICE INTO SPINAL CANAL, PERCUTANEOUS APPROACH: ICD-10-PCS | Performed by: ANESTHESIOLOGY

## 2020-08-12 PROCEDURE — 86901 BLOOD TYPING SEROLOGIC RH(D): CPT | Performed by: OBSTETRICS & GYNECOLOGY

## 2020-08-12 PROCEDURE — 84550 ASSAY OF BLOOD/URIC ACID: CPT | Performed by: OBSTETRICS & GYNECOLOGY

## 2020-08-12 PROCEDURE — 86850 RBC ANTIBODY SCREEN: CPT | Performed by: OBSTETRICS & GYNECOLOGY

## 2020-08-12 PROCEDURE — 25000125 ZZHC RX 250: Performed by: OBSTETRICS & GYNECOLOGY

## 2020-08-12 RX ORDER — OXYCODONE AND ACETAMINOPHEN 5; 325 MG/1; MG/1
1 TABLET ORAL
Status: DISCONTINUED | OUTPATIENT
Start: 2020-08-12 | End: 2020-08-13

## 2020-08-12 RX ORDER — OXYTOCIN/0.9 % SODIUM CHLORIDE 30/500 ML
1-24 PLASTIC BAG, INJECTION (ML) INTRAVENOUS CONTINUOUS
Status: DISCONTINUED | OUTPATIENT
Start: 2020-08-12 | End: 2020-08-13

## 2020-08-12 RX ORDER — MISOPROSTOL 100 UG/1
25 TABLET ORAL
Status: DISCONTINUED | OUTPATIENT
Start: 2020-08-12 | End: 2020-08-13

## 2020-08-12 RX ORDER — NALOXONE HYDROCHLORIDE 0.4 MG/ML
.1-.4 INJECTION, SOLUTION INTRAMUSCULAR; INTRAVENOUS; SUBCUTANEOUS
Status: DISCONTINUED | OUTPATIENT
Start: 2020-08-12 | End: 2020-08-13

## 2020-08-12 RX ORDER — PENICILLIN G POTASSIUM 5000000 [IU]/1
5 INJECTION, POWDER, FOR SOLUTION INTRAMUSCULAR; INTRAVENOUS ONCE
Status: COMPLETED | OUTPATIENT
Start: 2020-08-12 | End: 2020-08-12

## 2020-08-12 RX ORDER — NALBUPHINE HYDROCHLORIDE 10 MG/ML
2.5-5 INJECTION, SOLUTION INTRAMUSCULAR; INTRAVENOUS; SUBCUTANEOUS EVERY 6 HOURS PRN
Status: DISCONTINUED | OUTPATIENT
Start: 2020-08-12 | End: 2020-08-13

## 2020-08-12 RX ORDER — ROPIVACAINE HYDROCHLORIDE 2 MG/ML
10 INJECTION, SOLUTION EPIDURAL; INFILTRATION; PERINEURAL ONCE
Status: COMPLETED | OUTPATIENT
Start: 2020-08-12 | End: 2020-08-12

## 2020-08-12 RX ORDER — CARBOPROST TROMETHAMINE 250 UG/ML
250 INJECTION, SOLUTION INTRAMUSCULAR
Status: DISCONTINUED | OUTPATIENT
Start: 2020-08-12 | End: 2020-08-13

## 2020-08-12 RX ORDER — ONDANSETRON 2 MG/ML
4 INJECTION INTRAMUSCULAR; INTRAVENOUS EVERY 6 HOURS PRN
Status: DISCONTINUED | OUTPATIENT
Start: 2020-08-12 | End: 2020-08-13

## 2020-08-12 RX ORDER — IBUPROFEN 400 MG/1
800 TABLET, FILM COATED ORAL
Status: COMPLETED | OUTPATIENT
Start: 2020-08-12 | End: 2020-08-13

## 2020-08-12 RX ORDER — METHYLERGONOVINE MALEATE 0.2 MG/ML
200 INJECTION INTRAVENOUS
Status: DISCONTINUED | OUTPATIENT
Start: 2020-08-12 | End: 2020-08-13

## 2020-08-12 RX ORDER — OXYTOCIN/0.9 % SODIUM CHLORIDE 30/500 ML
100-340 PLASTIC BAG, INJECTION (ML) INTRAVENOUS CONTINUOUS PRN
Status: DISCONTINUED | OUTPATIENT
Start: 2020-08-12 | End: 2020-08-13

## 2020-08-12 RX ORDER — SODIUM CHLORIDE, SODIUM LACTATE, POTASSIUM CHLORIDE, CALCIUM CHLORIDE 600; 310; 30; 20 MG/100ML; MG/100ML; MG/100ML; MG/100ML
INJECTION, SOLUTION INTRAVENOUS CONTINUOUS
Status: DISCONTINUED | OUTPATIENT
Start: 2020-08-12 | End: 2020-08-13

## 2020-08-12 RX ORDER — EPHEDRINE SULFATE 50 MG/ML
5 INJECTION, SOLUTION INTRAMUSCULAR; INTRAVENOUS; SUBCUTANEOUS
Status: DISCONTINUED | OUTPATIENT
Start: 2020-08-12 | End: 2020-08-13

## 2020-08-12 RX ORDER — OXYTOCIN 10 [USP'U]/ML
10 INJECTION, SOLUTION INTRAMUSCULAR; INTRAVENOUS
Status: DISCONTINUED | OUTPATIENT
Start: 2020-08-12 | End: 2020-08-13

## 2020-08-12 RX ORDER — ACETAMINOPHEN 325 MG/1
650 TABLET ORAL EVERY 4 HOURS PRN
Status: DISCONTINUED | OUTPATIENT
Start: 2020-08-12 | End: 2020-08-13

## 2020-08-12 RX ORDER — LIDOCAINE HYDROCHLORIDE AND EPINEPHRINE 15; 5 MG/ML; UG/ML
3 INJECTION, SOLUTION EPIDURAL
Status: COMPLETED | OUTPATIENT
Start: 2020-08-12 | End: 2020-08-12

## 2020-08-12 RX ORDER — ASPIRIN 81 MG/1
81 TABLET, CHEWABLE ORAL DAILY
Status: ON HOLD | COMMUNITY
End: 2022-04-22

## 2020-08-12 RX ORDER — TRANEXAMIC ACID 10 MG/ML
1 INJECTION, SOLUTION INTRAVENOUS EVERY 30 MIN PRN
Status: DISCONTINUED | OUTPATIENT
Start: 2020-08-12 | End: 2020-08-13

## 2020-08-12 RX ORDER — ONDANSETRON 4 MG/1
4 TABLET, ORALLY DISINTEGRATING ORAL EVERY 6 HOURS PRN
Status: DISCONTINUED | OUTPATIENT
Start: 2020-08-12 | End: 2020-08-13

## 2020-08-12 RX ADMIN — Medication 25 MCG: at 13:09

## 2020-08-12 RX ADMIN — SODIUM CHLORIDE, POTASSIUM CHLORIDE, SODIUM LACTATE AND CALCIUM CHLORIDE 1000 ML: 600; 310; 30; 20 INJECTION, SOLUTION INTRAVENOUS at 14:06

## 2020-08-12 RX ADMIN — SODIUM CHLORIDE 2.5 MILLION UNITS: 9 INJECTION, SOLUTION INTRAVENOUS at 16:05

## 2020-08-12 RX ADMIN — PENICILLIN G POTASSIUM 5 MILLION UNITS: 5000000 POWDER, FOR SOLUTION INTRAMUSCULAR; INTRAPLEURAL; INTRATHECAL; INTRAVENOUS at 12:18

## 2020-08-12 RX ADMIN — Medication 12 ML/HR: at 22:29

## 2020-08-12 RX ADMIN — ROPIVACAINE HYDROCHLORIDE 10 ML: 2 INJECTION, SOLUTION EPIDURAL; INFILTRATION at 14:48

## 2020-08-12 RX ADMIN — SODIUM CHLORIDE, POTASSIUM CHLORIDE, SODIUM LACTATE AND CALCIUM CHLORIDE: 600; 310; 30; 20 INJECTION, SOLUTION INTRAVENOUS at 12:17

## 2020-08-12 RX ADMIN — Medication 12 ML/HR: at 14:45

## 2020-08-12 RX ADMIN — Medication 2 MILLI-UNITS/MIN: at 17:24

## 2020-08-12 RX ADMIN — SODIUM CHLORIDE 2.5 MILLION UNITS: 9 INJECTION, SOLUTION INTRAVENOUS at 20:19

## 2020-08-12 RX ADMIN — LIDOCAINE HYDROCHLORIDE AND EPINEPHRINE 3 ML: 15; 5 INJECTION, SOLUTION EPIDURAL at 14:48

## 2020-08-12 RX ADMIN — SODIUM CHLORIDE, POTASSIUM CHLORIDE, SODIUM LACTATE AND CALCIUM CHLORIDE 125 ML/HR: 600; 310; 30; 20 INJECTION, SOLUTION INTRAVENOUS at 22:27

## 2020-08-12 ASSESSMENT — MIFFLIN-ST. JEOR: SCORE: 1977.75

## 2020-08-12 NOTE — PLAN OF CARE
The patient is admitted to room 218 for SROM.  The patient is starting to feel more discomfort from the uterine cramping.  Penicillin is started at 1218 for GBS status.  The patient is on Lovenox for Anti-phospholipid antibody syndrome and having multiple miscarriages.  Last dose of Lovenox was at 1930 on 8/11/20.  The patient is given one dose of Cytotec orally at 1309 to augment labor. The patient is feeling more uncomfortable and wants her cervix checked and she is 3 cm at 1400.  The patient is wanting a epidural for the uterine contractions.  The patient is prepped for a epidural placement.

## 2020-08-12 NOTE — ANESTHESIA PROCEDURE NOTES
Procedure note : epidural catheter  Staff -   Anesthesiologist:  Eduard Brady MD      Performed By: anesthesiologist        Pre-Procedure  Performed by Eduard Brady MD  Location: pre-op      Pre-Anesthestic Checklist: patient identified, IV checked, site marked, risks and benefits discussed, informed consent, monitors and equipment checked, pre-op evaluation, at physician/surgeon's request and post-op pain management    Timeout  Correct Patient: Yes   Correct Procedure: Yes   Correct Site: Yes   Correct Laterality: N/A   Correct Position: Yes   Site Marked: N/A   .   Procedure Documentation    .    Procedure: epidural catheter, .   Patient Position:sitting Insertion Site:L3-4  (midline approach) Injection technique: LORT saline   Local skin infiltrated with 3 mL of 1% lidocaine.  UMA at 5 cm    Patient Prep/Sterile Barriers; mask, sterile gloves, povidone-iodine 7.5% surgical scrub, patient draped.  .  Needle: ToAURSOSy needle   Needle Gauge: 17.    Needle Length (Inches) 3.5   # of attempts: 1 and # of redirects: : 0. .    Catheter: 19 G . .  Catheter threaded easily  3 cm epidural space.  8 cm at skin.   .    Assessment/Narrative  Paresthesias: No.  .  .  Aspiration negative for heme or CSF  . Test dose of 3 mL lidocaine 1.5% w/ 1:200,000 epinephrine at. Test dose negative for signs of intravascular, subdural or intrathecal injection. Comments:  Pt tolerated well.   Taped sterile and secure.   Ropivacaine bolus (documented separately in MAR) done >3 minutes after test dose, in increments, with intermittent negative aspirations.    FHTs stable after the procedure.   No complications.

## 2020-08-12 NOTE — PLAN OF CARE
0927 -  at 39+5/7 weeks presents to [facility] from home for evaluation of leaking fluid and cramping.  Patient states she woke at 0800 and noted clear fluid with pink tinged discharge.  Patient reports occasional cramping, denies VB.  POC discussed including monitoring, lab test, VS.  Patient verbally agrees. Monitors applied.  Admission assessment completed.    0940 - ROM+ collected and sent to lab.  Small amount of clear fluid noted with collection.    1020 - ROM+ positive.  SVE done with consent.  Cervix found to be 1-2/60-70/-3.  Fernandez score of 4.  Dr. Penny paged.    1040 - Second page to Dr. Penny.  Return call received.  MD updated on patient's arrival, current complaints, medical and pregnancy history, admission assessment, FHT's, contractions, SROM, BP's, lab results.  Orders received for admission with cervical ripening using PO cytotec, lab tests.  See orders for detailed orders received.  Patient updated on POC and agrees.    1058 - Monitors removed.  Report given to MIKE Sosa RN who will assume all cares.  Lab currently at bedside for blood draw.

## 2020-08-12 NOTE — H&P
No significant change in general health status based on examination of the patient, review of Nursing Admission Database and prenatal record.     presents at 39.5 weeks with SROM in latent labor.  Pregnancy complicated by antiphospholipid AB syndrome, on lovenox and baby ASA, last dose lovenox last PM.  3 prior SAB's.  Has morbid obesity and hypothyroid, well controlled on synthroid.  GBS +, on PCN.  Pt received po cytotec initially, now on pit and comfortable with CONSTANTIN.  Cx now 5-6/90/-2 (was 4 cm 2 hours prior).  EFW 9#.  FHT category 1.  Expectant mgmt.    Lucy Penny MD

## 2020-08-12 NOTE — ANESTHESIA PREPROCEDURE EVALUATION
"Anesthesia Pre-Procedure Evaluation    Patient: Cherie Montgomery   MRN: [id number] : 1985          Preoperative Diagnosis: * No surgery found *        Past Medical History:   Diagnosis Date     Anti-phospholipid antibody syndrome (H)      Dysmenorrhea      Obese      Thyroid disease     hypothyroid     Past Surgical History:   Procedure Laterality Date     DILATION AND CURETTAGE, OPERATIVE HYSTEROSCOPY WITH MORCELLATOR, COMBINED N/A 2019    Procedure: HYSTEROSCOPY, POLYPECTOMY, POSSIBLE DILATION AND CURETTAGE ([employer] MORCELLATOR AND FLUID MANAGEMENT);  Surgeon: Katia Rm MD;  Location:  OR     GYN SURGERY      D & C     TOE SURGERY                          Lab Results   Component Value Date    WBC 8.9 2020    HGB 12.4 2020    HCT 36.6 2020     2020    BUN 9 2020    CR 0.60 2020    ALT 16 2020    AST 15 2020    PTT 30 2020    INR 0.99 2020       Preop Vitals  BP Readings from Last 3 Encounters:   20 (!) 142/76   19 121/74    Pulse Readings from Last 3 Encounters:   19 81      Resp Readings from Last 3 Encounters:   20 16   19 18    SpO2 Readings from Last 3 Encounters:   19 95%      Temp Readings from Last 1 Encounters:   20 36.6  C (97.9  F) (Oral)    Ht Readings from Last 1 Encounters:   20 1.626 m (5' 4\")      Wt Readings from Last 1 Encounters:   20 129.3 kg (285 lb)    Estimated body mass index is 48.92 kg/m  as calculated from the following:    Height as of this encounter: 1.626 m (5' 4\").    Weight as of this encounter: 129.3 kg (285 lb).       Anesthesia Plan      History & Physical Review  History and physical reviewed and following examination; no interval change.    ASA Status:  2 .        Plan for Epidural            Postoperative Care      Consents                 Eduard Brady MD  "

## 2020-08-13 LAB — T PALLIDUM AB SER QL: NONREACTIVE

## 2020-08-13 PROCEDURE — 25800030 ZZH RX IP 258 OP 636: Performed by: OBSTETRICS & GYNECOLOGY

## 2020-08-13 PROCEDURE — 25000132 ZZH RX MED GY IP 250 OP 250 PS 637: Performed by: OBSTETRICS & GYNECOLOGY

## 2020-08-13 PROCEDURE — 0DQR0ZZ REPAIR ANAL SPHINCTER, OPEN APPROACH: ICD-10-PCS | Performed by: OBSTETRICS & GYNECOLOGY

## 2020-08-13 PROCEDURE — 25000125 ZZHC RX 250: Performed by: OBSTETRICS & GYNECOLOGY

## 2020-08-13 PROCEDURE — 12000035 ZZH R&B POSTPARTUM

## 2020-08-13 PROCEDURE — 72200001 ZZH LABOR CARE VAGINAL DELIVERY SINGLE

## 2020-08-13 PROCEDURE — 25000128 H RX IP 250 OP 636: Performed by: ANESTHESIOLOGY

## 2020-08-13 PROCEDURE — 25000128 H RX IP 250 OP 636: Performed by: OBSTETRICS & GYNECOLOGY

## 2020-08-13 RX ORDER — NALOXONE HYDROCHLORIDE 0.4 MG/ML
.1-.4 INJECTION, SOLUTION INTRAMUSCULAR; INTRAVENOUS; SUBCUTANEOUS
Status: DISCONTINUED | OUTPATIENT
Start: 2020-08-13 | End: 2020-08-15 | Stop reason: HOSPADM

## 2020-08-13 RX ORDER — AMOXICILLIN 250 MG
2 CAPSULE ORAL 2 TIMES DAILY
Status: DISCONTINUED | OUTPATIENT
Start: 2020-08-13 | End: 2020-08-15 | Stop reason: HOSPADM

## 2020-08-13 RX ORDER — OXYTOCIN/0.9 % SODIUM CHLORIDE 30/500 ML
100 PLASTIC BAG, INJECTION (ML) INTRAVENOUS CONTINUOUS
Status: DISCONTINUED | OUTPATIENT
Start: 2020-08-13 | End: 2020-08-15 | Stop reason: HOSPADM

## 2020-08-13 RX ORDER — OXYTOCIN/0.9 % SODIUM CHLORIDE 30/500 ML
340 PLASTIC BAG, INJECTION (ML) INTRAVENOUS CONTINUOUS PRN
Status: DISCONTINUED | OUTPATIENT
Start: 2020-08-13 | End: 2020-08-15 | Stop reason: HOSPADM

## 2020-08-13 RX ORDER — BISACODYL 10 MG
10 SUPPOSITORY, RECTAL RECTAL DAILY PRN
Status: DISCONTINUED | OUTPATIENT
Start: 2020-08-15 | End: 2020-08-15 | Stop reason: HOSPADM

## 2020-08-13 RX ORDER — HYDROCORTISONE 2.5 %
CREAM (GRAM) TOPICAL 3 TIMES DAILY PRN
Status: DISCONTINUED | OUTPATIENT
Start: 2020-08-13 | End: 2020-08-15 | Stop reason: HOSPADM

## 2020-08-13 RX ORDER — OXYCODONE HYDROCHLORIDE 5 MG/1
5 TABLET ORAL EVERY 4 HOURS PRN
Status: DISCONTINUED | OUTPATIENT
Start: 2020-08-13 | End: 2020-08-15 | Stop reason: HOSPADM

## 2020-08-13 RX ORDER — IBUPROFEN 400 MG/1
800 TABLET, FILM COATED ORAL EVERY 6 HOURS PRN
Status: DISCONTINUED | OUTPATIENT
Start: 2020-08-13 | End: 2020-08-15 | Stop reason: HOSPADM

## 2020-08-13 RX ORDER — HYDROMORPHONE HYDROCHLORIDE 1 MG/ML
0.3 INJECTION, SOLUTION INTRAMUSCULAR; INTRAVENOUS; SUBCUTANEOUS
Status: DISCONTINUED | OUTPATIENT
Start: 2020-08-13 | End: 2020-08-15 | Stop reason: HOSPADM

## 2020-08-13 RX ORDER — TRANEXAMIC ACID 10 MG/ML
1 INJECTION, SOLUTION INTRAVENOUS EVERY 30 MIN PRN
Status: DISCONTINUED | OUTPATIENT
Start: 2020-08-13 | End: 2020-08-15 | Stop reason: HOSPADM

## 2020-08-13 RX ORDER — METHYLERGONOVINE MALEATE 0.2 MG/ML
200 INJECTION INTRAVENOUS
Status: DISCONTINUED | OUTPATIENT
Start: 2020-08-13 | End: 2020-08-15 | Stop reason: HOSPADM

## 2020-08-13 RX ORDER — MISOPROSTOL 200 UG/1
800 TABLET ORAL
Status: DISCONTINUED | OUTPATIENT
Start: 2020-08-13 | End: 2020-08-15 | Stop reason: HOSPADM

## 2020-08-13 RX ORDER — OXYTOCIN 10 [USP'U]/ML
10 INJECTION, SOLUTION INTRAMUSCULAR; INTRAVENOUS
Status: DISCONTINUED | OUTPATIENT
Start: 2020-08-13 | End: 2020-08-15 | Stop reason: HOSPADM

## 2020-08-13 RX ORDER — MODIFIED LANOLIN
OINTMENT (GRAM) TOPICAL
Status: DISCONTINUED | OUTPATIENT
Start: 2020-08-13 | End: 2020-08-15 | Stop reason: HOSPADM

## 2020-08-13 RX ORDER — LEVOTHYROXINE SODIUM 75 UG/1
75 TABLET ORAL DAILY
Status: DISCONTINUED | OUTPATIENT
Start: 2020-08-13 | End: 2020-08-15 | Stop reason: HOSPADM

## 2020-08-13 RX ORDER — ACETAMINOPHEN 325 MG/1
650 TABLET ORAL EVERY 4 HOURS PRN
Status: DISCONTINUED | OUTPATIENT
Start: 2020-08-13 | End: 2020-08-15 | Stop reason: HOSPADM

## 2020-08-13 RX ORDER — AMOXICILLIN 250 MG
1 CAPSULE ORAL 2 TIMES DAILY
Status: DISCONTINUED | OUTPATIENT
Start: 2020-08-13 | End: 2020-08-15 | Stop reason: HOSPADM

## 2020-08-13 RX ADMIN — ACETAMINOPHEN 650 MG: 325 TABLET, FILM COATED ORAL at 00:25

## 2020-08-13 RX ADMIN — Medication 12 ML/HR: at 04:27

## 2020-08-13 RX ADMIN — ACETAMINOPHEN 650 MG: 325 TABLET, FILM COATED ORAL at 13:30

## 2020-08-13 RX ADMIN — LIDOCAINE HYDROCHLORIDE 20 ML: 10 INJECTION, SOLUTION INFILTRATION; PERINEURAL at 06:46

## 2020-08-13 RX ADMIN — SODIUM CHLORIDE 2.5 MILLION UNITS: 9 INJECTION, SOLUTION INTRAVENOUS at 00:30

## 2020-08-13 RX ADMIN — SODIUM CHLORIDE 2.5 MILLION UNITS: 9 INJECTION, SOLUTION INTRAVENOUS at 04:27

## 2020-08-13 RX ADMIN — DOCUSATE SODIUM 50 MG AND SENNOSIDES 8.6 MG 1 TABLET: 8.6; 5 TABLET, FILM COATED ORAL at 20:24

## 2020-08-13 RX ADMIN — IBUPROFEN 800 MG: 400 TABLET ORAL at 13:30

## 2020-08-13 RX ADMIN — IBUPROFEN 800 MG: 400 TABLET ORAL at 19:43

## 2020-08-13 RX ADMIN — ENOXAPARIN SODIUM 40 MG: 40 INJECTION SUBCUTANEOUS at 20:24

## 2020-08-13 RX ADMIN — IBUPROFEN 800 MG: 400 TABLET ORAL at 07:31

## 2020-08-13 RX ADMIN — ACETAMINOPHEN 650 MG: 325 TABLET, FILM COATED ORAL at 19:43

## 2020-08-13 RX ADMIN — ACETAMINOPHEN 650 MG: 325 TABLET, FILM COATED ORAL at 07:31

## 2020-08-13 NOTE — PLAN OF CARE
PP recovery course normal. Up to bathroom and voided. Had clot, but unable to determine size. Fundus firm and even. Light flow afterwards.     Data: Cherie Montgomery transferred to [facility] via wheelchair at 0900. Baby transferred via parent's arms.  Action: Receiving unit notified of transfer: Yes. Patient and family notified of room change. Report given to Kristan RN at bedside. Belongings sent to receiving unit. Accompanied by Registered Nurse. Oriented patient to surroundings. Call light within reach. ID bands double-checked with receiving RN.  Response: Patient tolerated transfer and is stable.

## 2020-08-13 NOTE — PLAN OF CARE
Patient transferred to [facility] about 0900,oriented to room,call light,safety measures reviewed,voiding with out difficulty,pain control with tylenol&ibuprofen,working on breast feeding.Will continue to monitor.

## 2020-08-13 NOTE — ANESTHESIA POSTPROCEDURE EVALUATION
Patient: Cherie Montgomery    * No procedures listed *    Diagnosis:* No pre-op diagnosis entered *  Diagnosis Additional Information: No value filed.    Anesthesia Type:  No value filed.    Note:  Anesthesia Post Evaluation    Patient participation: Able to fully participate in evaluation     Anesthetic complications: None    Comments: Pt doing well.  Denies epidural-related complaints.        Last vitals:  Vitals:    08/13/20 0800 08/13/20 0815 08/13/20 0830   BP: 121/66 111/65 111/62   Resp:      Temp:      SpO2: 95% 95% 95%         Electronically Signed By: ROGER DARDEN MD  August 13, 2020  3:57 PM

## 2020-08-13 NOTE — PLAN OF CARE
House bulb caught between pelvic bone and fetal head. Uine output bloody, pushed house bulb back in palce

## 2020-08-13 NOTE — L&D DELIVERY NOTE
DELIVERY SUMMARY:  Date: 2020     HISTORY:  Cherie Montgomery is a 35 year old  at 39w6d gestation. Prenatal course complicated by antiphospholipid antibody syndrome on lovenox and baby ASA, recurrent miscarriage, hypothryoidism, obesity. GBS positive.  She is Rh positive and Rubella immune.      FIRST STAGE:  She presented to labor and delivery with SROM in latent labor and was given oral cytotec followed by pitocin.  Amniotic fluid was noted to be clear at the time of SROM but appeared meconium stained following delivery.  She progressed to complete at 0510.  CONSTANTIN analgesia.    SECOND STAGE:   Fetal heart tones were reassuring during the second stage of labor.  Head delivered ALANNA over intact perineum.  No nuchal cord. Shoulders were delivered without difficulty and the remainder of the body followed.  The male infant was placed directly on the maternal abdomen and the infant was bulb suctioned.  Cord clamping was delayed 60 seconds after which the cord was clamped and cut.  Cord blood was obtained.  IV Pitocin was given through running IV.  Apgar 8 at 1 minute and 9 at 5 minutes.  Weight 10-4.    THIRD STAGE:  Pitocin was administered after delivery of the infant.  The placenta delivered spontaneously with gentle cord traction. A partial third degree perineal laceration was repaired with 2-0 vicryl suture for the anal sphincter (less than 50% of the EAS torn) and 3-0 chromic for the remainder of the repair (vaginal mucosa and perineum).  It was an intermediate wound repair with a length of 6 cm .  The uterus was noted to be firm.  Sponge and needle counts were correct.  Quantitated blood loss was 412 cc.  Mom and baby doing well and stable to transfer to postpartum recovery.    Lucy Penny MD  Obstetrics, Gynecology, and Infertility

## 2020-08-13 NOTE — LACTATION NOTE
"Initial visit with Cherie, FOB and baby.  Baby LGA OT was 40.  LC brought in pump and instructed on hand expression 2 gtts obtain.  Mother upset that Lc brought I pump and States\"i don't want to give a bottle.\"  Explained pumping for stimulation.  Explained to parent that RN will take BG at 1230 PMPM and if BG is less than 36 will give gel have baby attempt to feed, have mother pump hand express and we will iam to supplement by finger feed HDM or formula. Mother states \"I dont want supplement\".    LC also requested the the peds MD return to room and re-explain why it is important to supplement for BG. Breastfeeding general information reviewed.   Advised to breastfeed exclusively, on demand, avoid pacifiers, bottles and formula unless medically indicated.  Encouraged rooming in, skin to skin, feeding on demand 8-12x/day or sooner if baby cues.  Explained benefits of holding and skin to skin.  Encouraged lots of skin to skin. Instructed on hand expression.   Continues to nurse well per mom. No further questions at this time.   Will follow as needed.   Katalina Chowdhury BSN, RN, PHN, RNC-MNN, IBCLC    "

## 2020-08-13 NOTE — PROVIDER NOTIFICATION
08/13/20 0230   Provider Notification   Provider Name/Title Dr. Penny   Method of Notification In Department   Request Evaluate - Remote   Notification Reason Status Update;Labor Status;Uterine Activity;SVE     Dr. Penny updated. 8/90%/0/clear fluid. Small cervical swelling on right side. Strong contractions 2-4 mins. Adequate but bloody urine output. Ordered to continue increasing pitocim and monitoring.

## 2020-08-14 LAB — HGB BLD-MCNC: 9.4 G/DL (ref 11.7–15.7)

## 2020-08-14 PROCEDURE — 36415 COLL VENOUS BLD VENIPUNCTURE: CPT | Performed by: OBSTETRICS & GYNECOLOGY

## 2020-08-14 PROCEDURE — 12000035 ZZH R&B POSTPARTUM

## 2020-08-14 PROCEDURE — 85018 HEMOGLOBIN: CPT | Performed by: OBSTETRICS & GYNECOLOGY

## 2020-08-14 PROCEDURE — 25000132 ZZH RX MED GY IP 250 OP 250 PS 637: Performed by: OBSTETRICS & GYNECOLOGY

## 2020-08-14 PROCEDURE — 25000128 H RX IP 250 OP 636: Performed by: OBSTETRICS & GYNECOLOGY

## 2020-08-14 RX ORDER — FERROUS SULFATE 325(65) MG
325 TABLET, DELAYED RELEASE (ENTERIC COATED) ORAL DAILY
Qty: 30 TABLET | Refills: 0 | Status: SHIPPED | OUTPATIENT
Start: 2020-08-14 | End: 2021-12-21

## 2020-08-14 RX ORDER — ACETAMINOPHEN 325 MG/1
650 TABLET ORAL EVERY 4 HOURS PRN
Qty: 50 TABLET | Refills: 0 | Status: SHIPPED | OUTPATIENT
Start: 2020-08-14 | End: 2023-01-11

## 2020-08-14 RX ORDER — IBUPROFEN 800 MG/1
800 TABLET, FILM COATED ORAL EVERY 6 HOURS PRN
Qty: 30 TABLET | Refills: 0 | Status: SHIPPED | OUTPATIENT
Start: 2020-08-14 | End: 2021-12-21

## 2020-08-14 RX ORDER — AMOXICILLIN 250 MG
2 CAPSULE ORAL 2 TIMES DAILY
Qty: 120 TABLET | Refills: 0 | Status: SHIPPED | OUTPATIENT
Start: 2020-08-14 | End: 2021-12-21

## 2020-08-14 RX ADMIN — IBUPROFEN 800 MG: 400 TABLET ORAL at 03:01

## 2020-08-14 RX ADMIN — DOCUSATE SODIUM 50 MG AND SENNOSIDES 8.6 MG 1 TABLET: 8.6; 5 TABLET, FILM COATED ORAL at 10:56

## 2020-08-14 RX ADMIN — ACETAMINOPHEN 650 MG: 325 TABLET, FILM COATED ORAL at 10:56

## 2020-08-14 RX ADMIN — IBUPROFEN 800 MG: 400 TABLET ORAL at 23:38

## 2020-08-14 RX ADMIN — ACETAMINOPHEN 650 MG: 325 TABLET, FILM COATED ORAL at 23:38

## 2020-08-14 RX ADMIN — IBUPROFEN 800 MG: 400 TABLET ORAL at 17:29

## 2020-08-14 RX ADMIN — ACETAMINOPHEN 650 MG: 325 TABLET, FILM COATED ORAL at 17:29

## 2020-08-14 RX ADMIN — ACETAMINOPHEN 650 MG: 325 TABLET, FILM COATED ORAL at 03:01

## 2020-08-14 RX ADMIN — ENOXAPARIN SODIUM 40 MG: 40 INJECTION SUBCUTANEOUS at 20:25

## 2020-08-14 RX ADMIN — DOCUSATE SODIUM 50 MG AND SENNOSIDES 8.6 MG 1 TABLET: 8.6; 5 TABLET, FILM COATED ORAL at 20:25

## 2020-08-14 RX ADMIN — IBUPROFEN 800 MG: 400 TABLET ORAL at 10:56

## 2020-08-14 NOTE — PLAN OF CARE
VSS. Fundus firm and midline. Scant flow. Pain 4/10, pain controled with tylenol and ibuprofen per MAR. Breastfeeding/ pumping. Ambulating free of dizziness or lightheaded. Voiding without difficulty. Encouraged to call with needs, questions, or concerns. Will continue to monitor.

## 2020-08-14 NOTE — PROGRESS NOTES
"Postpartum Progress Note  Cherie Montgomery  [id number]    Subjective:   Doing well this morning. Hasn't slept much, baby was cluster feeding and cranky overnight. Reports good pain control with ibuprofen and tylenol. Mild cramping with nursing. Some swelling in LE. Lochia moderate. Has not had a bowel movement yet, feels like she is \"cleaned out\" from before delivery. Eating and drinking well. Ambulating without difficulty. Voiding spontaneously.   Breastfeeding is going okay. Reports that her nipples are sore and baby cluster fed yesterday. Baby has jaundice so rechecking levels this morning.    Objective:  /79   Pulse 89   Temp 98.5  F (36.9  C) (Oral)   Resp 16   Ht 1.626 m (5' 4\")   Wt 129.3 kg (285 lb)   LMP 2019   SpO2 95%   Breastfeeding Unknown   BMI 48.92 kg/m      No intake/output data recorded.    General: NAD, sitting comfortably, alert  Heart: regular rate  Lungs: nonlabored breathing  Abdomen: soft, non distended, non tender to palpation. Fundus firm below U.   Extremities: warm and well perfused, 1+ edema in LE, nontender      Assessment/Plan:  Cherie Montgomery is a 35 year old  who is PPD#1 s/p  after MIL for SROM in latent labor. Delivery complicated by partial 3rd degree laceration. Currently stable and doing well postpartum.  - Routine post-partum cares  - Discussed importance of bowel regimen and avoiding straining with partial third degree tear. Continue stool softeners, will send Rx home with discharge as well.   - Heme: , AM Hgb 9.4, asymptomatic, expected anemia related to acute blood loss with delivery. Will resume PNV at discharge and start oral iron supplementation at discharge.  - Rh pos, Rubella immune  - Baby: doing well in room, breastfeeding  - Lactation consult again today  - Dispo: d/c to home on PPD#2, orders done for discharge    Shelbie Ohara MD  OB/GYN & Infertility  20      "

## 2020-08-14 NOTE — PLAN OF CARE
VSS. Pain controlled well with Ibuprofen and Tylenol. Using ice and tucks. Breastfeeding improving with latch assist. Independent with cares. Questions answered. Will continue to monitor.

## 2020-08-14 NOTE — PLAN OF CARE
Pt taking tylenol and ibuprofen for pain, using ice an tucks, VSS, tolerating diet, voiding adequate amounts, saline lock removed. Baby breastfeeding fair, using syringe and tube at breast with EBM and formula. Encouraged to call with any questions or concerns. Will continue to monitor.

## 2020-08-14 NOTE — LACTATION NOTE
"Routine visit with Cherie, CARLOS and infant. Infant attempting to feed at left breast in football hold when LC into room. Cherie states he is not interested right now. Infant rooting around. LC able to assist and quickly get infant latched on with deep latch and flanged lips. Nutritive suck pattern observed and multiple swallows heard. Infant suckled for 10 minutes. LC broke latch and inserted tube feeding device with latch on. Infant able to pull through 1ml EBM and 6mls Similac on his own. Burped and latched onto right breast. Small area of scabbing noted on side of right nipple. Likely from pumping. Discussed proper pump flange placement. Hydrogels brought into room. Hydrogel use explained. Plan to continue with lanolin on left breast.    Breastfeeding general information reviewed. Advised to breastfeed exclusively, on demand, avoid pacifiers, bottles and formula unless medically indicated.    Encouraged rooming in, skin to skin, feeding on demand 8-12x/day or sooner if baby cues.  Explained benefits of holding and skin to skin. Discussed typical feeding pattern for the next 24-48 hours.     Discussed typical onset of mature milk. Instructed on hand expression and when to start pumping and introducing a bottle if needed when returning to work.     Breastfeeding going well most of the time per mother. Encouraged to read \"A New Beginning\". Patient thankful for  support and advice.    All questions answered. No further questions at this time. Will follow as needed.     SKYE Aragon RN, BSN, PHN, IBCLC    "

## 2020-08-15 VITALS
OXYGEN SATURATION: 95 % | TEMPERATURE: 97.5 F | HEART RATE: 81 BPM | BODY MASS INDEX: 48.65 KG/M2 | SYSTOLIC BLOOD PRESSURE: 128 MMHG | HEIGHT: 64 IN | DIASTOLIC BLOOD PRESSURE: 86 MMHG | WEIGHT: 285 LBS | RESPIRATION RATE: 16 BRPM

## 2020-08-15 PROCEDURE — 25000128 H RX IP 250 OP 636: Performed by: OBSTETRICS & GYNECOLOGY

## 2020-08-15 PROCEDURE — 25000132 ZZH RX MED GY IP 250 OP 250 PS 637: Performed by: OBSTETRICS & GYNECOLOGY

## 2020-08-15 RX ADMIN — ACETAMINOPHEN 650 MG: 325 TABLET, FILM COATED ORAL at 06:12

## 2020-08-15 RX ADMIN — ENOXAPARIN SODIUM 40 MG: 40 INJECTION SUBCUTANEOUS at 17:27

## 2020-08-15 RX ADMIN — DOCUSATE SODIUM 50 MG AND SENNOSIDES 8.6 MG 1 TABLET: 8.6; 5 TABLET, FILM COATED ORAL at 08:12

## 2020-08-15 RX ADMIN — ACETAMINOPHEN 650 MG: 325 TABLET, FILM COATED ORAL at 11:56

## 2020-08-15 RX ADMIN — IBUPROFEN 800 MG: 400 TABLET ORAL at 11:56

## 2020-08-15 RX ADMIN — IBUPROFEN 800 MG: 400 TABLET ORAL at 06:12

## 2020-08-15 NOTE — PLAN OF CARE
VSS. Pain controlled well with Ibuprofen and Tylenol. Using ice and tucks. Breastfeeding improving with latch assist and nipple shield. Pt pumping and supplementing with EBM and Formula per finger-feed. Independent with cares. Questions answered. Will continue to monitor.

## 2020-08-15 NOTE — PLAN OF CARE
Pt VSS, voiding in good amounts.  FF and flow minimal, no clots.    Working on breast feeding, pt is also pumping and supplementing. Pain well controlled with prescribed pain medications. Independent with cares of self and infant in the room.  Significant other at bedside and supportive.  No concerns at this time, will continue to monitor.

## 2020-08-15 NOTE — PLAN OF CARE
VSS and fundus firm.  Patient states pain adequately managed with PRN pain medications.  Patient discharged but staying with baby who is on phototherapy for jaundice and will room in. Discharge instructions, self care, and reasons to call doctor reviewed with patient.  Rx reviewed, verified and given to patient.  Patient verbalizes understanding to make follow-up appointment as directed by physician.  Patient states no questions with discharge.

## 2020-08-15 NOTE — PLAN OF CARE
Follow up Lactation visit with Cherie. Pt reports feeding is going well. Reviewed milk supply and engorgement. General questions answered regarding supplementing with bili-lights. Will check in tomorrow as needed.

## 2020-08-15 NOTE — PLAN OF CARE
VSS.Ibuprofen & Tylenol providing adequate pain control for perineal discomfort. Also using ice and tucks pads. Conner. Reg diet. Voiding. Breast feeds going well.pumping for EBM, 1 ml obtained.

## 2020-08-15 NOTE — PLAN OF CARE
Vitals within defined limits. Fundus firm. Lochia scant. Voiding without issues. Up ad jude. Using Tylenol/Motrin for perineal soreness with good relief, declined additional PRN pain meds. Using ice packs/tucks for comfort as well. Breastfeeding and supplementing every 3 hours. Spouse at bedside and supportive.

## 2020-08-15 NOTE — PROGRESS NOTES
August 15, 2020  Postpartum Progress Note:       DAILY NOTE - POSTPARTUM DAY 2    SUBJECTIVE:     Pain controlled? Yes  Ambulating? YES  Voiding without difficulty? Yes  Lochia? minimal    OBJECTIVE:  Vitals:    08/14/20 1700 08/14/20 2333 08/15/20 0849 08/15/20 0900   BP: (!) 133/98 130/75 (!) 138/95 128/86   BP Location:   Right arm Right arm   Pulse: 81      Resp: 16 18 18    Temp: 99.1  F (37.3  C) 97.6  F (36.4  C) 97.5  F (36.4  C)    TempSrc: Oral Oral Oral    SpO2:       Weight:       Height:           Constitutional: Healthy, Alert, No distress  Abdomen:  Uterine fundus is firm, non-tender and at the level of the umbilicus     LABS:  Hemoglobin   Date Value Ref Range Status   08/14/2020 9.4 (L) 11.7 - 15.7 g/dL Final     Comment:     Delta Verified   08/12/2020 12.4 11.7 - 15.7 g/dL Final       ASSESSMENT:  Post-partum day #2  Normal spontaneous vaginal delivery  Doing well.  Antiphospholipid ab syndrome     PLAN:   Discharge later today  Continue lovenox 40 mg subcutaneous every day x 6 weeks PP, has plenty at home so does not need rx. Baby needs to stay tonight due to bili levels so will give dose here tonight prior to discharge, ok to do a little early (7 pm)    Lucy Penny MD

## 2021-05-30 VITALS — BODY MASS INDEX: 42.32 KG/M2 | HEIGHT: 65 IN | WEIGHT: 254 LBS

## 2021-05-30 NOTE — TELEPHONE ENCOUNTER
Patient is on [facility] lab schedule 7/22/19 at 10:00am, she is brining outside orders. She has not established care with any of our providers, and per [facility] procedure, she will not be able to have outside labs done until she establishes care.

## 2021-05-30 NOTE — PROGRESS NOTES
Chief Complaint   Patient presents with     Medication Management     thyroid level check       HPI: Cherie presents today for recheck of her thyroid.  She is attempting to keep a pregnancy.  She has been pregnant 3 times but unfortunately has miscarriages.  Her obstetrician would like her to have a TSH and a T4 checked.  Currently taking no thyroid supplements.    She stopped smoking over a year ago, is losing weight, and exercising more frequently.  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    ROS: No neck swelling.  No skin changes reported.    SH:    reports that she has quit smoking. Her smoking use included cigarettes. She smoked 0.14 packs per day. She has never used smokeless tobacco. She reports that she drinks about 0.5 - 1.0 oz of alcohol per week. She reports that she does not use drugs.      FH: The Patient's family history includes ADD / ADHD in her unknown relative; Alcohol abuse in her unknown relative; Bipolar disorder in her unknown relative; Depression in her unknown relative.     Meds:  Cherie has a current medication list which includes the following prescription(s): levothyroxine.    O:  /80   Pulse 63   Resp 16   Wt (!) 249 lb 7 oz (113.1 kg)   SpO2 98%   BMI 42.82 kg/m    Alert conversant no acute distress  Skin pink and dry  Neck supple with no thyromegaly.    A/P:   1. Hypothyroidism, unspecified type  We will refill her Synthroid and recheck T4 and TSH today.  If labs are abnormal we will readjust.  - levothyroxine (SYNTHROID, LEVOTHROID) 75 MCG tablet; Take 1 tablet (75 mcg total) by mouth Daily at 6:00 am.  Dispense: 90 tablet; Refill: 3  - Thyroid Stimulating Hormone (TSH)  - T4, Free

## 2021-06-01 VITALS — BODY MASS INDEX: 50.02 KG/M2 | WEIGHT: 293 LBS | HEIGHT: 64 IN

## 2021-06-01 VITALS — HEIGHT: 64 IN | WEIGHT: 293 LBS | BODY MASS INDEX: 50.02 KG/M2

## 2021-06-03 VITALS — BODY MASS INDEX: 42.82 KG/M2 | WEIGHT: 249.44 LBS

## 2021-06-09 NOTE — PROGRESS NOTES
"DIAGNOSIS: Ingrown toenails    PLAN: Improvement with pain with nail debridement today.  She is still very anxious about any injection or more aggressive procedure.  We talked about doing it in the hospital with IV sedation.  She will consider that.  Prescription for cephalexin sent to her pharmacy.  Return to clinic as needed.  Fifteen minute visit, greater than half our time spent counseling and coordinating medical care.     SUBJECTIVE: The patient returns to the [facility] for follow up of ingrown toenails.  Both sides of both great toenails have been intermittently painful over the past 2 years.  Chronic swelling around nail edges.  She sees bleeding sometimes but rarely any pus.  She works at a [employer], and can sometimes persuade coworkers to clean out the nail edges for her.  She is very anxious about any sort of injection in the toes.    PHYSICAL EXAM:  Visit Vitals     Pulse 78     Ht 5' 5\" (1.651 m)     Wt (!) 254 lb (115.2 kg)     BMI 42.27 kg/m2     General: Pleasant 31 y.o. female in no acute distress.  Vascular: DP pulses are palpable. PT pulses are palpable. Pedal hair is present. Feet are warm to the touch.  Cardiac: Pulse is regular.  Lymphatic: No edema.  Neuro: Sensation in the feet is grossly intact to light touch.  Derm: Great toenails are incurved medially and laterally.  Mildly tender to the touch today.  No drainage.  Surrounding skin shows erythema and edema.  Musculoskeletal: Adequate passive range of motion and foot and ankle joints.       "

## 2021-06-17 NOTE — ANESTHESIA CARE TRANSFER NOTE
Last vitals:   Vitals:    04/18/18 0804   BP: 169/71   Pulse: 98   Resp: 20   Temp: 36.6  C (97.8  F)   SpO2: 97%     Patient's level of consciousness is drowsy  Spontaneous respirations: yes  Maintains airway independently: yes  Dentition unchanged: yes  Oropharynx: oropharynx clear of all foreign objects    QCDR Measures:  ASA# 20 - Surgical Safety Checklist: WHO surgical safety checklist completed prior to induction  PQRS# 430 - Adult PONV Prevention: 4558F - Pt received => 2 anti-emetic agents (different classes) preop & intraop  ASA# 8 - Peds PONV Prevention: NA - Not pediatric patient, not GA or 2 or more risk factors NOT present  PQRS# 424 - Samira-op Temp Management: 4559F - At least one body temp DOCUMENTED => 35.5C or 95.9F within required timeframe  PQRS# 426 - PACU Transfer Protocol: - Transfer of care checklist used  ASA# 14 - Acute Post-op Pain: ASA14B - Patient did NOT experience pain >= 7 out of 10

## 2021-06-17 NOTE — ANESTHESIA PREPROCEDURE EVALUATION
"Anesthesia Evaluation      Patient summary reviewed   No history of anesthetic complications     Airway   Mallampati: II  Neck ROM: full   Pulmonary - negative ROS and normal exam    breath sounds clear to auscultation                         Cardiovascular - negative ROS and normal exam  Exercise tolerance: > or = 4 METS  Rhythm: regular  Rate: normal,         Neuro/Psych - negative ROS     Endo/Other    (+) obesity (M.O. BMI 51),      GI/Hepatic/Renal - negative ROS   (-) GERD     Other findings: \"Broken retainer\"      Dental - normal exam                        Anesthesia Plan  Planned anesthetic: MAC    ASA 3     Anesthetic plan and risks discussed with: patient    Post-op plan: routine recovery          "

## 2021-06-17 NOTE — ANESTHESIA POSTPROCEDURE EVALUATION
Patient: Cherie Montgomery  SUCTION DILATION AND CURETTAGE  Anesthesia type: general    Patient location: Phase II Recovery  Last vitals:   Vitals:    04/18/18 0850   BP:    Pulse: 83   Resp:    Temp:    SpO2: 97%     Post vital signs: stable  Level of consciousness: awake, alert and responds to simple questions  Post-anesthesia pain: pain controlled  Post-anesthesia nausea and vomiting: no  Pulmonary: unassisted, return to baseline  Cardiovascular: stable and blood pressure at baseline  Hydration: adequate  Anesthetic events: no    QCDR Measures:  ASA# 11 - Samira-op Cardiac Arrest: ASA11B - Patient did NOT experience unanticipated cardiac arrest  ASA# 12 - Samira-op Mortality Rate: ASA12B - Patient did NOT die  ASA# 13 - PACU Re-Intubation Rate: ASA13B - Patient did NOT require a new airway mgmt  ASA# 10 - Composite Anes Safety: ASA10A - No serious adverse event    Additional Notes:

## 2021-06-19 NOTE — PROGRESS NOTES
Preoperative Exam  Very pleasant 33-year-old female presents today for preoperative evaluation for conscious sedation for right ingrown toe excision on 7/13/2018.  She has had past surgeries including D&Cs with no complications other than requiring intubation anesthesia.  Family history is reviewed and is negative.  Socially she does not smoke and she works as a hairdresser.  She is a Mallampati type I.    Scheduled Procedure: removal of ingrown toenail on both big toes  Surgery Date:  7/13/18  Surgery Location: [facility]    Surgeon:  Hero Rodrigues MD    Assessment/Plan:     1. Ingrown nail of great toe of right foot  -Patient is an optimized for surgery.    Surgical Procedure Risk: Low (reported cardiac risk generally < 1%)  Have you had prior anesthesia?: Yes  Have you or any family members had a previous anesthesia reaction:  No  Do you or any family members have a history of a clotting or bleeding disorder?: No  Cardiac Risk Assessment: no increased risk for major cardiac complications    Patient has been optimized for surgery.        Functional Status: Independent  Patient plans to recover at home with family.     Subjective:      Cherie Montgomery is a 33 y.o. female who presents for a preoperative consultation.      All other systems reviewed and are negative, other than those listed in the HPI.    Pertinent History  Do you have difficulty breathing or chest pain after walking up a flight of stairs: No  History of obstructive sleep apnea: No  Steroid use in the last 6 months: No  Frequent Aspirin/NSAID use: No  Prior Blood Transfusion: No  Prior Blood Transfusion Reaction: N/A  If for some reason prior to, during or after the procedure, if it is medically indicated, would you be willing to have a blood transfusion?:  There is no transfusion refusal.    Current Outpatient Prescriptions   Medication Sig Dispense Refill     cholecalciferol, vitamin D3, 1,000 unit tablet Take 2,000 Units by  mouth daily.       HYDROcodone-acetaminophen (NORCO) 5-325 mg per tablet Take 1-2 tablets by mouth every 4 (four) hours as needed for pain. 5 tablet 0     PRENATAL VIT CALC,IRON,FOLIC (PRENATAL VITAMIN ORAL) Take 1 tablet by mouth daily.       No current facility-administered medications for this visit.       ROS: All 12 systems reviewed and are negative except for history of migraine headaches, renal calculi, multiple miscarriages and vitamin D deficiency.    No Known Allergies    There is no problem list on file for this patient.      Past Medical History:   Diagnosis Date     Attention deficit disorder (ADD)      Miscarriage     x 2       Past Surgical History:   Procedure Laterality Date     DILATION AND CURETTAGE OF UTERUS N/A 4/18/2018    Procedure: SUCTION DILATION AND CURETTAGE;  Surgeon: Roxanna Moody MD;  Location: [facility];  Service:        Social History     Social History     Marital status: Single     Spouse name: N/A     Number of children: 0     Years of education: 13     Occupational History     [employer]     Social History Main Topics     Smoking status: Former Smoker     Packs/day: 0.14     Types: Cigarettes     Smokeless tobacco: Never Used     Alcohol use 0.5 - 1.0 oz/week     1 - 2 Standard drinks or equivalent per week      Comment: occasional     Drug use: No     Sexual activity: Yes     Partners: Male     Other Topics Concern     Not on file     Social History Narrative    Father's medical history is unknown       Patient Care Team:  No Primary Care Provider as PCP - General          Objective:     There were no vitals filed for this visit.      Physical Exam:  Physical Exam    Lungs--Clear to Auscultation  Heart--Regular rate and rhythm  Abdomen--Soft, non-tender, non-distended  Skin-Pink and dry   There are no Patient Instructions on file for this visit.        Labs:  Hemoglobin from 4/18/2018 is 12.5.      There is no immunization history on file  for this patient.  Thank you for the opportunity to participate in the care for this patient.  If you have any concerns please do not hesitate to contact me at the [facility] at [phone].      Electronically signed by Jasper Balderas MD 07/09/18 10:00 AM

## 2021-06-25 NOTE — TELEPHONE ENCOUNTER
Call out to patient per lab staff request. Pt has lab appt 3/18/19      Left message to call back for: patient  Information to relay to patient:  Outside order has been received but can't draw her unless this is her primary care clinic. Is she going to see our providers for ongoing care?

## 2021-07-03 NOTE — ADDENDUM NOTE
Addendum Note by Heidi Khan CRNA at 4/18/2018  9:36 AM     Author: Heidi Khan CRNA Service: -- Author Type: Nurse Anesthetist    Filed: 4/18/2018  9:36 AM Date of Service: 4/18/2018  9:36 AM Status: Signed    : Heidi Khan CRNA (Nurse Anesthetist)       Addendum  created 04/18/18 0936 by Heidi Khan CRNA    Anesthesia Event deleted, Anesthesia Event edited, Anesthesia Intra Flowsheets edited, Anesthesia Intra LDAs edited, Anesthesia Intra SmartForms edited, Flowsheet data copied forward, LDA properties accepted, Procedure Event Log accessed

## 2021-10-04 ENCOUNTER — MEDICAL CORRESPONDENCE (OUTPATIENT)
Dept: HEALTH INFORMATION MANAGEMENT | Facility: CLINIC | Age: 36
End: 2021-10-04

## 2021-10-04 ENCOUNTER — TRANSCRIBE ORDERS (OUTPATIENT)
Dept: MATERNAL FETAL MEDICINE | Facility: CLINIC | Age: 36
End: 2021-10-04

## 2021-10-04 ENCOUNTER — TRANSFERRED RECORDS (OUTPATIENT)
Dept: HEALTH INFORMATION MANAGEMENT | Facility: CLINIC | Age: 36
End: 2021-10-04

## 2021-10-04 DIAGNOSIS — I48.91 A-FIB (H): Primary | ICD-10-CM

## 2021-10-04 DIAGNOSIS — O26.90 PREGNANCY RELATED CONDITION, ANTEPARTUM: Primary | ICD-10-CM

## 2021-12-06 ENCOUNTER — PRE VISIT (OUTPATIENT)
Dept: MATERNAL FETAL MEDICINE | Facility: CLINIC | Age: 36
End: 2021-12-06
Payer: COMMERCIAL

## 2021-12-06 ENCOUNTER — OFFICE VISIT (OUTPATIENT)
Dept: MATERNAL FETAL MEDICINE | Facility: CLINIC | Age: 36
End: 2021-12-06
Attending: NURSE PRACTITIONER
Payer: COMMERCIAL

## 2021-12-06 ENCOUNTER — HOSPITAL ENCOUNTER (OUTPATIENT)
Dept: ULTRASOUND IMAGING | Facility: CLINIC | Age: 36
End: 2021-12-06
Attending: NURSE PRACTITIONER
Payer: COMMERCIAL

## 2021-12-06 DIAGNOSIS — D68.61 ANTIPHOSPHOLIPID ANTIBODY SYNDROME (H): ICD-10-CM

## 2021-12-06 DIAGNOSIS — O99.212 OBESITY AFFECTING PREGNANCY IN SECOND TRIMESTER: Primary | ICD-10-CM

## 2021-12-06 DIAGNOSIS — O26.90 PREGNANCY RELATED CONDITION, ANTEPARTUM: ICD-10-CM

## 2021-12-06 DIAGNOSIS — Z36.2 ENCOUNTER FOR FOLLOW-UP ULTRASOUND OF FETAL ANATOMY: ICD-10-CM

## 2021-12-06 PROCEDURE — 76811 OB US DETAILED SNGL FETUS: CPT | Mod: 26 | Performed by: OBSTETRICS & GYNECOLOGY

## 2021-12-06 PROCEDURE — 76811 OB US DETAILED SNGL FETUS: CPT

## 2021-12-06 NOTE — PROGRESS NOTES
The patient was seen for an ultrasound in the [facility] at the [facility] today.  For a detailed report of the ultrasound examination, please see the ultrasound report which can be found under the imaging tab.    Dania So MD  , OB/GYN  Maternal-Fetal Medicine  [phone] (Pager)

## 2021-12-21 ENCOUNTER — OFFICE VISIT (OUTPATIENT)
Dept: CARDIOLOGY | Facility: CLINIC | Age: 36
End: 2021-12-21
Attending: NURSE PRACTITIONER
Payer: COMMERCIAL

## 2021-12-21 VITALS
SYSTOLIC BLOOD PRESSURE: 116 MMHG | WEIGHT: 280 LBS | HEART RATE: 76 BPM | HEIGHT: 64 IN | BODY MASS INDEX: 47.8 KG/M2 | DIASTOLIC BLOOD PRESSURE: 70 MMHG | RESPIRATION RATE: 16 BRPM

## 2021-12-21 DIAGNOSIS — I48.0 PAROXYSMAL ATRIAL FIBRILLATION (H): Primary | ICD-10-CM

## 2021-12-21 DIAGNOSIS — D68.61 ANTIPHOSPHOLIPID SYNDROME (H): ICD-10-CM

## 2021-12-21 DIAGNOSIS — R00.2 PALPITATIONS: ICD-10-CM

## 2021-12-21 DIAGNOSIS — Z33.1 PREGNANCY, INCIDENTAL: ICD-10-CM

## 2021-12-21 PROBLEM — E66.01 MORBID OBESITY (H): Status: ACTIVE | Noted: 2021-12-21

## 2021-12-21 PROBLEM — E66.9 OBESITY: Status: ACTIVE | Noted: 2021-12-21

## 2021-12-21 PROBLEM — E03.9 HYPOTHYROIDISM: Status: ACTIVE | Noted: 2021-12-21

## 2021-12-21 PROCEDURE — 99204 OFFICE O/P NEW MOD 45 MIN: CPT | Performed by: NURSE PRACTITIONER

## 2021-12-21 RX ORDER — LEVOTHYROXINE SODIUM 125 UG/1
TABLET ORAL
COMMUNITY
Start: 2021-11-19 | End: 2023-01-10

## 2021-12-21 ASSESSMENT — MIFFLIN-ST. JEOR: SCORE: 1950.07

## 2021-12-21 NOTE — PROGRESS NOTES
Thank you, Dr. Balderas, for asking the [facility] team to see Ms. Cherie Montgomery to evaluate palpitations and paroxysmal atrial fibrillation.    Assessment/Recommendations     Assessment/Plan:    Diagnoses and all orders for this visit:  Paroxysmal atrial fibrillation (H) was diagnosed in June 2021 and converted in the emergency room on IV diltiazem.  Directly symptomatic with atrial fibrillation.  First known episode.  No particular trigger.  I discussed natural progression with atrial fibrillation and if she has a second episode that this will be an ongoing issue for her.  I discussed that natural progression varies quite widely from 1 individual to another and she could go to years before she has another episode of atrial fibrillation.  I discussed antiarrhythmics and ablation as treatment options for A. fib if it continues to be an ongoing issue.  I discussed high association between obstructive sleep apnea and atrial fibrillation.  I discussed weight loss of 10 to 15 pounds after pregnancy can decrease incidence of A. fib.  Multiple questions answered.  Antiphospholipid syndrome (H) and on injectable Lovenox during pregnancy.  Pregnancy, incidental  Palpitations for several years prior to A. fib episode.  Palpitations are generally limited to 5 to 10 minutes.  This sounds more like SVT than atrial fibrillation.  Due to pregnancy and reluctant to place her on a small dose of beta-blocker and she prefers not to be on daily medication as well.  I recommended she try Valsalva maneuver for palpitations.   other orders  -     Adult Cardiology Eval Referral      [id number] score of 1 and on long-term aspirin and now injectable Lovenox.  Follow up in A. fib clinic with me as needed     History of Present Illness/Subjective     Cherie Montgomery is a very pleasant 36 year old female who comes in today for EP consult regarding paroxysmal atrial fibrillation.  Cherie Montgomery has a known  "history of obesity and has had several miscarriages.  She has 1 child and is in her second pregnancy at 21 weeks.  She had ultrasound which is normal.  This pregnancy has been uneventful.  Her medical history is significant for hypothyroidism on replacement, obesity and antiphospholipid syndrome.  She complains of several episodes a week of palpitations which lasts 5 to 10 minutes.  They have never been longer than this and she is primarily disturbed by the palpitations as such abrupt onset and termination.  She tells me that these palpitations feel different than when she went into atrial fibrillation.  Atrial fibrillation did not feel as fast for her but she was very short of breath.  She denies any other cardiac symptoms.  She does a lot of walking in her job but does not exercise in a gym.  She is intolerant so she has a fair amount of activity at home as well.  She has struggled with weight gain most of her life.  She has been on weight watchers before which has been helpful.  I discussed some general principles of diet which can be helpful to favor weight loss.  I encouraged increasing vegetables and following a Mediterranean type of diet.    Cardiographics (reviewed):  6/4/2021 twelve-lead EKG shows atrial fibrillation with RVR.  6/20/2021 echo shows normal ejection fraction and no structural or valvular heart disease.       Problem List:  Patient Active Problem List   Diagnosis     Paroxysmal atrial fibrillation (H)     Hypothyroidism     Antiphospholipid syndrome (H)     Revi  e  Physical Examination Review of Systems   edd andujar  /70 (BP Location: Left arm, Patient Position: Sitting, Cuff Size: Adult Large)   Pulse 76   Resp 16   Ht 1.626 m (5' 4\")   Wt 127 kg (280 lb)   LMP 07/23/2021   BMI 48.06 kg/m    Body mass index is 48.06 kg/m .  Wt Readings from Last 3 Encounters:   12/21/21 127 kg (280 lb)   08/12/20 129.3 kg (285 lb)   07/22/19 113.1 kg (249 lb 7 oz)     General Appearance:   Alert, " well-appearing and in no acute distress.   HEENT: Atraumatic, normocephalic.  No scleral icterus, normal conjunctivae; mucous membranes pink and moist.     Chest: Chest symmetric, spine straight.   Lungs:   Respirations unlabored: Lungs are clear to auscultation.   Cardiovascular:   Normal first and second heart sounds with no murmurs, rubs, or gallops.  Regular, regular.   Normal JVD, no edema.       Extremities: No cyanosis or clubbing   Musculoskeletal: Moves all extremities   Skin: Warm, dry, intact.    Neurologic: Mood and affect are appropriate, alert and oriented to person, place, time, and situation     ROS: 10 point ROS neg other than the symptoms noted above in the HPI.     Medical History  Surgical History Family History Social History     Past Medical History:   Diagnosis Date     Anti-phospholipid antibody syndrome (H)      Dysmenorrhea      Obese      Thyroid disease     hypothyroid    Past Surgical History:   Procedure Laterality Date     DILATION AND CURETTAGE N/A 4/18/2018    Procedure: SUCTION DILATION AND CURETTAGE;  Surgeon: Roxanna Moody MD;  Location: [facility];  Service:      DILATION AND CURETTAGE, OPERATIVE HYSTEROSCOPY WITH MORCELLATOR, COMBINED N/A 1/21/2019    Procedure: HYSTEROSCOPY, POLYPECTOMY, POSSIBLE DILATION AND CURETTAGE ([email or web address] MORCELLATOR AND FLUID MANAGEMENT);  Surgeon: Katia Rm MD;  Location:  OR     GYN SURGERY      D & C     TOE SURGERY      Family History   Problem Relation Age of Onset     Bipolar Disorder Other      Depression Other      Alcoholism Other      Attention Deficit Disorder Other     History   Smoking Status     Former Smoker     Quit date: 1/21/2018   Smokeless Tobacco     Never Used     Social History    Substance and Sexual Activity      Alcohol use: Yes        Comment: 2/month       Medications  Allergies     Current Outpatient Medications   Medication Sig Dispense Refill     acetaminophen (TYLENOL) 325 MG tablet Take 2  tablets (650 mg) by mouth every 4 hours as needed for mild pain or fever 50 tablet 0     aspirin (ASA) 81 MG chewable tablet Take 81 mg by mouth daily       Enoxaparin Sodium (LOVENOX IJ) Inject as directed daily        levothyroxine (SYNTHROID/LEVOTHROID) 125 MCG tablet TAKE 1 TABLET BY MOUTH DAILY AS DIRECTED       Prenatal Vit-Fe Fumarate-FA (PRENATAL PO)         No Known Allergies   Medical, surgical, family, social history, and medications were all reviewed and updated as necessary.   Lab Results    Chemistry/lipid CBC Cardiac Enzymes/BNP/TSH/INR   No results for input(s): CHOL, HDL, LDL, TRIG, CHOLHDLRATIO in the last 85893 hours.  No results for input(s): LDL in the last 78145 hours.  Recent Labs   Lab Test 08/12/20  1106   BUN 9   CR 0.60   GFRESTIMATED >90     Recent Labs   Lab Test 08/12/20  1106   CR 0.60     No results for input(s): A1C in the last 93358 hours.       Recent Labs   Lab Test 08/14/20  0825 08/12/20  1106   WBC  --  8.9   HGB 9.4* 12.4   HCT  --  36.6   MCV  --  91   PLT  --  241     Recent Labs   Lab Test 08/14/20  0825 08/12/20  1106 04/18/18  0624   HGB 9.4* 12.4 12.5    No results for input(s): TROPONINI in the last 12908 hours.  No results for input(s): BNP, NTBNPI, NTBNP in the last 23742 hours.  Recent Labs   Lab Test 07/22/19  1057   TSH 2.47     Recent Labs   Lab Test 08/12/20  1106   INR 0.99          Total Time- 40 minutes spent on date of encounter doing chart review, history and exam, documentation and further activities as noted above.  This note has been dictated using voice recognition software. Any grammatical, typographical, or context distortions are unintentional and inherent to the software.

## 2021-12-21 NOTE — PATIENT INSTRUCTIONS
Cherie Montgomery,    It was a pleasure to see you today at the [facility].     My recommendations after this visit include:    I think the short palpitations you have is likely supraventricular tachycardia which you can likely stop with a valsalva maneuver.    I discussed treatments for atrial fibrillation will vary from antiarrhythmics which is to help prevent episodes or shorten them to pulmonary vein isolation ablation, which is to cure you of atrial fibrillation.  There is a strong association between obstructive sleep apnea and atrial fibrillation.  After pregnancy, I encourage you to lose some weight even 10-15 lbs can be helpful.    Please call if atrial fibrillation for 12 to 24 hours.      To followup with me as needed.      My contact information:  Trevor Parham, FREEDOM  After Hours or Scheduling  [phone]  My Nurse---Angela Painter [phone]

## 2021-12-27 ENCOUNTER — ANCILLARY PROCEDURE (OUTPATIENT)
Dept: ULTRASOUND IMAGING | Facility: HOSPITAL | Age: 36
End: 2021-12-27
Attending: OBSTETRICS & GYNECOLOGY
Payer: COMMERCIAL

## 2021-12-27 ENCOUNTER — OFFICE VISIT (OUTPATIENT)
Dept: MATERNAL FETAL MEDICINE | Facility: HOSPITAL | Age: 36
End: 2021-12-27
Attending: OBSTETRICS & GYNECOLOGY
Payer: COMMERCIAL

## 2021-12-27 DIAGNOSIS — O99.212 OBESITY AFFECTING PREGNANCY IN SECOND TRIMESTER: ICD-10-CM

## 2021-12-27 DIAGNOSIS — O09.522 AMA (ADVANCED MATERNAL AGE) MULTIGRAVIDA 35+, SECOND TRIMESTER: Primary | ICD-10-CM

## 2021-12-27 DIAGNOSIS — D68.61 ANTIPHOSPHOLIPID ANTIBODY SYNDROME (H): ICD-10-CM

## 2021-12-27 DIAGNOSIS — Z36.2 ENCOUNTER FOR FOLLOW-UP ULTRASOUND OF FETAL ANATOMY: ICD-10-CM

## 2021-12-27 DIAGNOSIS — O36.1920 MATERNAL ATYPICAL ANTIBODY AFFECTING PREGNANCY IN SECOND TRIMESTER, SINGLE OR UNSPECIFIED FETUS: ICD-10-CM

## 2021-12-27 PROCEDURE — 76816 OB US FOLLOW-UP PER FETUS: CPT

## 2021-12-27 PROCEDURE — 76816 OB US FOLLOW-UP PER FETUS: CPT | Mod: 26 | Performed by: OBSTETRICS & GYNECOLOGY

## 2021-12-27 PROCEDURE — 99214 OFFICE O/P EST MOD 30 MIN: CPT | Mod: 25 | Performed by: OBSTETRICS & GYNECOLOGY

## 2022-03-30 ENCOUNTER — VIRTUAL VISIT (OUTPATIENT)
Dept: FAMILY MEDICINE | Facility: CLINIC | Age: 37
End: 2022-03-30
Payer: COMMERCIAL

## 2022-03-30 DIAGNOSIS — J01.90 ACUTE SINUSITIS WITH SYMPTOMS > 10 DAYS: Primary | ICD-10-CM

## 2022-03-30 PROCEDURE — 99213 OFFICE O/P EST LOW 20 MIN: CPT | Mod: 95 | Performed by: PHYSICIAN ASSISTANT

## 2022-03-30 RX ORDER — PREDNISONE 20 MG/1
TABLET ORAL
Status: ON HOLD | COMMUNITY
Start: 2022-03-23 | End: 2022-04-22

## 2022-03-30 RX ORDER — ASPIRIN 81 MG/1
TABLET ORAL
Status: ON HOLD | COMMUNITY
Start: 2021-06-05 | End: 2022-04-19

## 2022-03-30 RX ORDER — LEVOTHYROXINE SODIUM 125 UG/1
125 TABLET ORAL
Status: ON HOLD | COMMUNITY
Start: 2022-03-14 | End: 2022-04-19

## 2022-03-30 RX ORDER — LEVOTHYROXINE SODIUM 112 UG/1
TABLET ORAL
COMMUNITY
Start: 2021-12-20 | End: 2022-03-30

## 2022-03-30 RX ORDER — FLUTICASONE PROPIONATE 50 MCG
SPRAY, SUSPENSION (ML) NASAL
Status: ON HOLD | COMMUNITY
Start: 2022-03-23 | End: 2022-04-22

## 2022-03-30 RX ORDER — AZITHROMYCIN 250 MG/1
TABLET, FILM COATED ORAL
Status: ON HOLD | COMMUNITY
Start: 2022-03-21 | End: 2022-04-22

## 2022-03-30 ASSESSMENT — ENCOUNTER SYMPTOMS
EYE ITCHING: 0
EYE REDNESS: 0
FATIGUE: 0
VOMITING: 0
CHILLS: 0
RHINORRHEA: 0
WHEEZING: 0
EYE DISCHARGE: 0
APPETITE CHANGE: 0
ACTIVITY CHANGE: 0
NAUSEA: 0
ABDOMINAL DISTENTION: 0
DIARRHEA: 0
COUGH: 0
CONSTIPATION: 0
SHORTNESS OF BREATH: 0
FEVER: 0
SORE THROAT: 0
PALPITATIONS: 0
EYE PAIN: 0

## 2022-03-30 NOTE — PROGRESS NOTES
Cherie is a 36 year old who is being evaluated via a billable telephone visit.      What phone number would you like to be contacted at? [phone]  How would you like to obtain your AVS? Mail a copy    Assessment & Plan       ICD-10-CM    1. Acute sinusitis with symptoms > 10 days  J01.90      #1 acute sinusitis-it does appear that her symptoms are improving.  She has finished the Augmentin and steroids.  She is continuing to do the Flonase.  She still has ongoing sinus congestion and ear pain.  We did discuss that her congestion and ear pressure should improve over the next 5 to 7 days.  Symptomatic cares were discussed today.  I would like her to continue the Flonase.  She is to continue to push fluids and get rest.  If she continues to have symptoms for over a week after finished antibiotics and prednisone she should let us know otherwise it is reassuring that her symptoms are improving.  Symptoms for prompt reevaluation were discussed in detail today.  Patient agreed this plan.  Questions were answered    #2 acute otitis media-she also had a ear infection.  Since the antibiotics her pain is improved but she still has some muffled sounding.  It sounds like clinically she has improved in regards to the infection I would like her to monitor symptoms.  Continue with Flonase.         No follow-ups on file.    [facility]   Cherie is a 36 year old who presents for the following health issues     Cherie is a pleasant 36-year-old female that presents via telephone visit today to discuss ongoing symptoms.  She had Covid about 5 weeks ago.  Symptoms were about week along including fevers, chills, body aches, and upper respiratory symptoms and fatigue.  Symptoms fully resolved but 14 2021 she developed a sore throat and then on 3/19/2021 she developed any aches, fevers, upper respiratory congestion and sinus pressure.  She was seen in the urgent care on  322 and then again on 323.  She was started on Augmentin and prednisone for sinus infection.  Also started on Flonase.  She was also given a steroid burst which she has finished.  Overall she feels that her symptoms have improved.  She is having no fatigue or fever and her energy is back.  She continues to have some pressure in her ears and some nasal congestion.  She is eating and drinking well and she is not having any abdominal pains nausea or vomiting.         Review of Systems   Constitutional: Negative for activity change, appetite change, chills, fatigue and fever.   HENT: Positive for congestion and ear pain (b/l ear pressure but no pain. ). Negative for ear discharge, postnasal drip, rhinorrhea and sore throat.    Eyes: Negative for pain, discharge, redness and itching.   Respiratory: Negative for cough, shortness of breath and wheezing.    Cardiovascular: Negative for chest pain and palpitations.   Gastrointestinal: Negative for abdominal distention, constipation, diarrhea, nausea and vomiting.         Objective           Vitals:  No vitals were obtained today due to virtual visit.    Physical Exam   healthy, alert and no distress  PSYCH: Alert and oriented times 3; coherent speech, normal   rate and volume, able to articulate logical thoughts, able   to abstract reason, no tangential thoughts, no hallucinations   or delusions  Her affect is normal  RESP: No cough, no audible wheezing, able to talk in full sentences  Remainder of exam unable to be completed due to telephone visits      Phone call duration: 15 minutes

## 2022-04-05 ENCOUNTER — TRANSFERRED RECORDS (OUTPATIENT)
Dept: HEALTH INFORMATION MANAGEMENT | Facility: CLINIC | Age: 37
End: 2022-04-05
Payer: COMMERCIAL

## 2022-04-11 ENCOUNTER — HOSPITAL ENCOUNTER (OUTPATIENT)
Facility: CLINIC | Age: 37
Discharge: HOME | End: 2022-04-11
Attending: OBSTETRICS & GYNECOLOGY | Admitting: OBSTETRICS & GYNECOLOGY
Payer: COMMERCIAL

## 2022-04-11 VITALS — OXYGEN SATURATION: 95 % | SYSTOLIC BLOOD PRESSURE: 144 MMHG | DIASTOLIC BLOOD PRESSURE: 80 MMHG

## 2022-04-11 PROBLEM — Z36.89 ENCOUNTER FOR TRIAGE IN PREGNANT PATIENT: Status: ACTIVE | Noted: 2022-04-11

## 2022-04-11 LAB
ALT SERPL W P-5'-P-CCNC: 17 U/L (ref 0–50)
AST SERPL W P-5'-P-CCNC: 18 U/L (ref 0–45)
CREAT SERPL-MCNC: 0.56 MG/DL (ref 0.52–1.04)
CREAT UR-MCNC: 68 MG/DL
ERYTHROCYTE [DISTWIDTH] IN BLOOD BY AUTOMATED COUNT: 12.8 % (ref 10–15)
GFR SERPL CREATININE-BSD FRML MDRD: >90 ML/MIN/1.73M2
HCT VFR BLD AUTO: 33.7 % (ref 35–47)
HGB BLD-MCNC: 11.3 G/DL (ref 11.7–15.7)
MCH RBC QN AUTO: 30.5 PG (ref 26.5–33)
MCHC RBC AUTO-ENTMCNC: 33.5 G/DL (ref 31.5–36.5)
MCV RBC AUTO: 91 FL (ref 78–100)
PLATELET # BLD AUTO: 271 10E3/UL (ref 150–450)
PROT UR-MCNC: 0.08 G/L
PROT/CREAT 24H UR: 0.12 G/G CR (ref 0–0.2)
RBC # BLD AUTO: 3.7 10E6/UL (ref 3.8–5.2)
URATE SERPL-MCNC: 3.8 MG/DL (ref 2.6–6)
WBC # BLD AUTO: 6.6 10E3/UL (ref 4–11)

## 2022-04-11 PROCEDURE — 84450 TRANSFERASE (AST) (SGOT): CPT | Performed by: OBSTETRICS & GYNECOLOGY

## 2022-04-11 PROCEDURE — 84156 ASSAY OF PROTEIN URINE: CPT | Performed by: OBSTETRICS & GYNECOLOGY

## 2022-04-11 PROCEDURE — 82565 ASSAY OF CREATININE: CPT | Performed by: OBSTETRICS & GYNECOLOGY

## 2022-04-11 PROCEDURE — 84460 ALANINE AMINO (ALT) (SGPT): CPT | Performed by: OBSTETRICS & GYNECOLOGY

## 2022-04-11 PROCEDURE — 36415 COLL VENOUS BLD VENIPUNCTURE: CPT | Performed by: OBSTETRICS & GYNECOLOGY

## 2022-04-11 PROCEDURE — 84550 ASSAY OF BLOOD/URIC ACID: CPT | Performed by: OBSTETRICS & GYNECOLOGY

## 2022-04-11 PROCEDURE — 59025 FETAL NON-STRESS TEST: CPT

## 2022-04-11 PROCEDURE — 85014 HEMATOCRIT: CPT | Performed by: OBSTETRICS & GYNECOLOGY

## 2022-04-11 PROCEDURE — G0463 HOSPITAL OUTPT CLINIC VISIT: HCPCS | Mod: 25

## 2022-04-11 RX ORDER — ONDANSETRON 2 MG/ML
4 INJECTION INTRAMUSCULAR; INTRAVENOUS EVERY 6 HOURS PRN
Status: DISCONTINUED | OUTPATIENT
Start: 2022-04-11 | End: 2022-04-11 | Stop reason: HOSPADM

## 2022-04-11 RX ORDER — PROCHLORPERAZINE 25 MG
25 SUPPOSITORY, RECTAL RECTAL EVERY 12 HOURS PRN
Status: DISCONTINUED | OUTPATIENT
Start: 2022-04-11 | End: 2022-04-11 | Stop reason: HOSPADM

## 2022-04-11 RX ORDER — PROCHLORPERAZINE MALEATE 5 MG
10 TABLET ORAL EVERY 6 HOURS PRN
Status: DISCONTINUED | OUTPATIENT
Start: 2022-04-11 | End: 2022-04-11 | Stop reason: HOSPADM

## 2022-04-11 RX ORDER — METOCLOPRAMIDE 10 MG/1
10 TABLET ORAL EVERY 6 HOURS PRN
Status: DISCONTINUED | OUTPATIENT
Start: 2022-04-11 | End: 2022-04-11 | Stop reason: HOSPADM

## 2022-04-11 RX ORDER — ONDANSETRON 4 MG/1
4 TABLET, ORALLY DISINTEGRATING ORAL EVERY 6 HOURS PRN
Status: DISCONTINUED | OUTPATIENT
Start: 2022-04-11 | End: 2022-04-11 | Stop reason: HOSPADM

## 2022-04-11 RX ORDER — METOCLOPRAMIDE HYDROCHLORIDE 5 MG/ML
10 INJECTION INTRAMUSCULAR; INTRAVENOUS EVERY 6 HOURS PRN
Status: DISCONTINUED | OUTPATIENT
Start: 2022-04-11 | End: 2022-04-11 | Stop reason: HOSPADM

## 2022-04-11 NOTE — DISCHARGE INSTRUCTIONS
Discharge Instruction for Undelivered Patients      You were seen for:  pre-eclampsia evaluation  We Consulted: Dr. Rm  You had (Test or Medicine):external fetal monitoring with non-stress test, serial blood pressures, lab work for pre-eclampsia including protein to creatnine ratio    Diet:   Drink 8 to 12 glasses of liquids (milk, juice, water) every day.  You may eat meals and snacks.     Activity:  Call your doctor or nurse midwife if your baby is moving less than usual.     Call your provider if you notice:  Swelling in your face or increased swelling in your hands or legs.  Headaches that are not relieved by Tylenol (acetaminophen).  Changes in your vision (blurring: seeing spots or stars.)  Nausea (sick to your stomach) and vomiting (throwing up).   Weight gain of 5 pounds or more per week.  Heartburn that doesn't go away.  Signs of bladder infection: pain when you urinate (use the toilet), need to go more often and more urgently.  The bag of carranza (rupture of membranes) breaks, or you notice leaking in your underwear.  Bright red blood in your underwear.  Abdominal (lower belly) or stomach pain.  Second (plus) baby: Contractions (tightening) less than 10 minutes apart and getting stronger.  Increase or change in vaginal discharge (note the color and amount)  Other: Call for any questions or concerns    Follow-up:  Make an appointment to be seen on Wednesday in [place]

## 2022-04-19 ENCOUNTER — HOSPITAL ENCOUNTER (INPATIENT)
Facility: CLINIC | Age: 37
LOS: 4 days | Discharge: HOME | End: 2022-04-23
Attending: OBSTETRICS & GYNECOLOGY | Admitting: OBSTETRICS & GYNECOLOGY
Payer: COMMERCIAL

## 2022-04-19 ENCOUNTER — TRANSFERRED RECORDS (OUTPATIENT)
Dept: HEALTH INFORMATION MANAGEMENT | Facility: CLINIC | Age: 37
End: 2022-04-19

## 2022-04-19 DIAGNOSIS — D68.61 ANTIPHOSPHOLIPID SYNDROME (H): ICD-10-CM

## 2022-04-19 DIAGNOSIS — Z3A.38 38 WEEKS GESTATION OF PREGNANCY: ICD-10-CM

## 2022-04-19 PROBLEM — Z34.90 PREGNANCY: Status: ACTIVE | Noted: 2022-04-19

## 2022-04-19 LAB
ABO/RH(D): NORMAL
ALT SERPL W P-5'-P-CCNC: 18 U/L (ref 0–50)
ANION GAP SERPL CALCULATED.3IONS-SCNC: 6 MMOL/L (ref 3–14)
ANTIBODY SCREEN: NEGATIVE
AST SERPL W P-5'-P-CCNC: 12 U/L (ref 0–45)
BUN SERPL-MCNC: 7 MG/DL (ref 7–30)
CALCIUM SERPL-MCNC: 8.8 MG/DL (ref 8.5–10.1)
CHLORIDE BLD-SCNC: 107 MMOL/L (ref 94–109)
CO2 SERPL-SCNC: 21 MMOL/L (ref 20–32)
CREAT SERPL-MCNC: 0.61 MG/DL (ref 0.52–1.04)
CREAT SERPL-MCNC: 0.62 MG/DL (ref 0.52–1.04)
CREAT UR-MCNC: 167 MG/DL
ERYTHROCYTE [DISTWIDTH] IN BLOOD BY AUTOMATED COUNT: 12.8 % (ref 10–15)
GFR SERPL CREATININE-BSD FRML MDRD: >90 ML/MIN/1.73M2
GFR SERPL CREATININE-BSD FRML MDRD: >90 ML/MIN/1.73M2
GLUCOSE BLD-MCNC: 88 MG/DL (ref 70–99)
HCT VFR BLD AUTO: 35.3 % (ref 35–47)
HGB BLD-MCNC: 11.7 G/DL (ref 11.7–15.7)
MCH RBC QN AUTO: 30.5 PG (ref 26.5–33)
MCHC RBC AUTO-ENTMCNC: 33.1 G/DL (ref 31.5–36.5)
MCV RBC AUTO: 92 FL (ref 78–100)
PLATELET # BLD AUTO: 276 10E3/UL (ref 150–450)
POTASSIUM BLD-SCNC: 4 MMOL/L (ref 3.4–5.3)
PROT UR-MCNC: 0.19 G/L
PROT/CREAT 24H UR: 0.11 G/G CR (ref 0–0.2)
RBC # BLD AUTO: 3.83 10E6/UL (ref 3.8–5.2)
SODIUM SERPL-SCNC: 134 MMOL/L (ref 133–144)
SPECIMEN EXPIRATION DATE: NORMAL
WBC # BLD AUTO: 7.2 10E3/UL (ref 4–11)

## 2022-04-19 PROCEDURE — 250N000013 HC RX MED GY IP 250 OP 250 PS 637: Performed by: OBSTETRICS & GYNECOLOGY

## 2022-04-19 PROCEDURE — 85027 COMPLETE CBC AUTOMATED: CPT | Performed by: OBSTETRICS & GYNECOLOGY

## 2022-04-19 PROCEDURE — 120N000001 HC R&B MED SURG/OB

## 2022-04-19 PROCEDURE — G0463 HOSPITAL OUTPT CLINIC VISIT: HCPCS | Mod: 25

## 2022-04-19 PROCEDURE — 86901 BLOOD TYPING SEROLOGIC RH(D): CPT | Performed by: OBSTETRICS & GYNECOLOGY

## 2022-04-19 PROCEDURE — 36415 COLL VENOUS BLD VENIPUNCTURE: CPT | Performed by: OBSTETRICS & GYNECOLOGY

## 2022-04-19 PROCEDURE — 3E0P7VZ INTRODUCTION OF HORMONE INTO FEMALE REPRODUCTIVE, VIA NATURAL OR ARTIFICIAL OPENING: ICD-10-PCS | Performed by: OBSTETRICS & GYNECOLOGY

## 2022-04-19 PROCEDURE — 80048 BASIC METABOLIC PNL TOTAL CA: CPT | Performed by: OBSTETRICS & GYNECOLOGY

## 2022-04-19 PROCEDURE — 86780 TREPONEMA PALLIDUM: CPT | Performed by: OBSTETRICS & GYNECOLOGY

## 2022-04-19 PROCEDURE — 59025 FETAL NON-STRESS TEST: CPT

## 2022-04-19 PROCEDURE — 84450 TRANSFERASE (AST) (SGOT): CPT | Performed by: OBSTETRICS & GYNECOLOGY

## 2022-04-19 PROCEDURE — 84156 ASSAY OF PROTEIN URINE: CPT | Performed by: OBSTETRICS & GYNECOLOGY

## 2022-04-19 PROCEDURE — 84460 ALANINE AMINO (ALT) (SGPT): CPT | Performed by: OBSTETRICS & GYNECOLOGY

## 2022-04-19 RX ORDER — PROCHLORPERAZINE 25 MG
25 SUPPOSITORY, RECTAL RECTAL EVERY 12 HOURS PRN
Status: DISCONTINUED | OUTPATIENT
Start: 2022-04-19 | End: 2022-04-21 | Stop reason: HOSPADM

## 2022-04-19 RX ORDER — PENICILLIN G POTASSIUM 5000000 [IU]/1
5 INJECTION, POWDER, FOR SOLUTION INTRAMUSCULAR; INTRAVENOUS ONCE
Status: COMPLETED | OUTPATIENT
Start: 2022-04-19 | End: 2022-04-21

## 2022-04-19 RX ORDER — LEVOTHYROXINE SODIUM 125 UG/1
125 TABLET ORAL
Status: DISCONTINUED | OUTPATIENT
Start: 2022-04-20 | End: 2022-04-23 | Stop reason: HOSPADM

## 2022-04-19 RX ORDER — CARBOPROST TROMETHAMINE 250 UG/ML
250 INJECTION, SOLUTION INTRAMUSCULAR
Status: DISCONTINUED | OUTPATIENT
Start: 2022-04-19 | End: 2022-04-21 | Stop reason: HOSPADM

## 2022-04-19 RX ORDER — TERBUTALINE SULFATE 1 MG/ML
0.25 INJECTION, SOLUTION SUBCUTANEOUS
Status: DISCONTINUED | OUTPATIENT
Start: 2022-04-19 | End: 2022-04-21 | Stop reason: HOSPADM

## 2022-04-19 RX ORDER — ONDANSETRON 2 MG/ML
4 INJECTION INTRAMUSCULAR; INTRAVENOUS EVERY 6 HOURS PRN
Status: DISCONTINUED | OUTPATIENT
Start: 2022-04-19 | End: 2022-04-19 | Stop reason: HOSPADM

## 2022-04-19 RX ORDER — FENTANYL CITRATE 50 UG/ML
100 INJECTION, SOLUTION INTRAMUSCULAR; INTRAVENOUS
Status: DISCONTINUED | OUTPATIENT
Start: 2022-04-19 | End: 2022-04-21 | Stop reason: HOSPADM

## 2022-04-19 RX ORDER — PROCHLORPERAZINE 25 MG
25 SUPPOSITORY, RECTAL RECTAL EVERY 12 HOURS PRN
Status: DISCONTINUED | OUTPATIENT
Start: 2022-04-19 | End: 2022-04-19 | Stop reason: HOSPADM

## 2022-04-19 RX ORDER — METOCLOPRAMIDE HYDROCHLORIDE 5 MG/ML
10 INJECTION INTRAMUSCULAR; INTRAVENOUS EVERY 6 HOURS PRN
Status: DISCONTINUED | OUTPATIENT
Start: 2022-04-19 | End: 2022-04-21 | Stop reason: HOSPADM

## 2022-04-19 RX ORDER — MISOPROSTOL 200 UG/1
800 TABLET ORAL
Status: DISCONTINUED | OUTPATIENT
Start: 2022-04-19 | End: 2022-04-21 | Stop reason: HOSPADM

## 2022-04-19 RX ORDER — ONDANSETRON 2 MG/ML
4 INJECTION INTRAMUSCULAR; INTRAVENOUS EVERY 6 HOURS PRN
Status: DISCONTINUED | OUTPATIENT
Start: 2022-04-19 | End: 2022-04-21 | Stop reason: HOSPADM

## 2022-04-19 RX ORDER — METOCLOPRAMIDE 10 MG/1
10 TABLET ORAL EVERY 6 HOURS PRN
Status: DISCONTINUED | OUTPATIENT
Start: 2022-04-19 | End: 2022-04-21 | Stop reason: HOSPADM

## 2022-04-19 RX ORDER — MISOPROSTOL 200 UG/1
400 TABLET ORAL
Status: DISCONTINUED | OUTPATIENT
Start: 2022-04-19 | End: 2022-04-21 | Stop reason: HOSPADM

## 2022-04-19 RX ORDER — NALOXONE HYDROCHLORIDE 0.4 MG/ML
0.2 INJECTION, SOLUTION INTRAMUSCULAR; INTRAVENOUS; SUBCUTANEOUS
Status: DISCONTINUED | OUTPATIENT
Start: 2022-04-19 | End: 2022-04-21 | Stop reason: HOSPADM

## 2022-04-19 RX ORDER — PENICILLIN G 3000000 [IU]/50ML
3 INJECTION, SOLUTION INTRAVENOUS EVERY 4 HOURS
Status: DISCONTINUED | OUTPATIENT
Start: 2022-04-19 | End: 2022-04-21 | Stop reason: HOSPADM

## 2022-04-19 RX ORDER — IBUPROFEN 400 MG/1
800 TABLET, FILM COATED ORAL
Status: DISCONTINUED | OUTPATIENT
Start: 2022-04-19 | End: 2022-04-21

## 2022-04-19 RX ORDER — ONDANSETRON 4 MG/1
4 TABLET, ORALLY DISINTEGRATING ORAL EVERY 6 HOURS PRN
Status: DISCONTINUED | OUTPATIENT
Start: 2022-04-19 | End: 2022-04-19 | Stop reason: HOSPADM

## 2022-04-19 RX ORDER — SODIUM CHLORIDE, SODIUM LACTATE, POTASSIUM CHLORIDE, CALCIUM CHLORIDE 600; 310; 30; 20 MG/100ML; MG/100ML; MG/100ML; MG/100ML
INJECTION, SOLUTION INTRAVENOUS CONTINUOUS
Status: DISCONTINUED | OUTPATIENT
Start: 2022-04-19 | End: 2022-04-21 | Stop reason: HOSPADM

## 2022-04-19 RX ORDER — PROCHLORPERAZINE MALEATE 5 MG
10 TABLET ORAL EVERY 6 HOURS PRN
Status: DISCONTINUED | OUTPATIENT
Start: 2022-04-19 | End: 2022-04-19 | Stop reason: HOSPADM

## 2022-04-19 RX ORDER — MORPHINE SULFATE 10 MG/ML
10 INJECTION, SOLUTION INTRAMUSCULAR; INTRAVENOUS
Status: COMPLETED | OUTPATIENT
Start: 2022-04-19 | End: 2022-04-20

## 2022-04-19 RX ORDER — OXYTOCIN/0.9 % SODIUM CHLORIDE 30/500 ML
340 PLASTIC BAG, INJECTION (ML) INTRAVENOUS CONTINUOUS PRN
Status: DISCONTINUED | OUTPATIENT
Start: 2022-04-19 | End: 2022-04-21 | Stop reason: HOSPADM

## 2022-04-19 RX ORDER — HYDROXYZINE HYDROCHLORIDE 25 MG/1
50 TABLET, FILM COATED ORAL
Status: DISCONTINUED | OUTPATIENT
Start: 2022-04-19 | End: 2022-04-20

## 2022-04-19 RX ORDER — CITRIC ACID/SODIUM CITRATE 334-500MG
30 SOLUTION, ORAL ORAL
Status: COMPLETED | OUTPATIENT
Start: 2022-04-19 | End: 2022-04-20

## 2022-04-19 RX ORDER — OXYTOCIN 10 [USP'U]/ML
10 INJECTION, SOLUTION INTRAMUSCULAR; INTRAVENOUS
Status: DISCONTINUED | OUTPATIENT
Start: 2022-04-19 | End: 2022-04-21 | Stop reason: HOSPADM

## 2022-04-19 RX ORDER — METOCLOPRAMIDE 10 MG/1
10 TABLET ORAL EVERY 6 HOURS PRN
Status: DISCONTINUED | OUTPATIENT
Start: 2022-04-19 | End: 2022-04-19 | Stop reason: HOSPADM

## 2022-04-19 RX ORDER — OXYTOCIN 10 [USP'U]/ML
10 INJECTION, SOLUTION INTRAMUSCULAR; INTRAVENOUS
Status: DISCONTINUED | OUTPATIENT
Start: 2022-04-19 | End: 2022-04-21

## 2022-04-19 RX ORDER — KETOROLAC TROMETHAMINE 30 MG/ML
30 INJECTION, SOLUTION INTRAMUSCULAR; INTRAVENOUS
Status: DISCONTINUED | OUTPATIENT
Start: 2022-04-19 | End: 2022-04-21

## 2022-04-19 RX ORDER — NALOXONE HYDROCHLORIDE 0.4 MG/ML
0.4 INJECTION, SOLUTION INTRAMUSCULAR; INTRAVENOUS; SUBCUTANEOUS
Status: DISCONTINUED | OUTPATIENT
Start: 2022-04-19 | End: 2022-04-21 | Stop reason: HOSPADM

## 2022-04-19 RX ORDER — METHYLERGONOVINE MALEATE 0.2 MG/ML
200 INJECTION INTRAVENOUS
Status: DISCONTINUED | OUTPATIENT
Start: 2022-04-19 | End: 2022-04-21 | Stop reason: HOSPADM

## 2022-04-19 RX ORDER — OXYTOCIN/0.9 % SODIUM CHLORIDE 30/500 ML
100-340 PLASTIC BAG, INJECTION (ML) INTRAVENOUS CONTINUOUS PRN
Status: DISCONTINUED | OUTPATIENT
Start: 2022-04-19 | End: 2022-04-21

## 2022-04-19 RX ORDER — PROCHLORPERAZINE MALEATE 5 MG
10 TABLET ORAL EVERY 6 HOURS PRN
Status: DISCONTINUED | OUTPATIENT
Start: 2022-04-19 | End: 2022-04-21 | Stop reason: HOSPADM

## 2022-04-19 RX ORDER — ONDANSETRON 4 MG/1
4 TABLET, ORALLY DISINTEGRATING ORAL EVERY 6 HOURS PRN
Status: DISCONTINUED | OUTPATIENT
Start: 2022-04-19 | End: 2022-04-21 | Stop reason: HOSPADM

## 2022-04-19 RX ORDER — TRANEXAMIC ACID 10 MG/ML
1 INJECTION, SOLUTION INTRAVENOUS EVERY 30 MIN PRN
Status: DISCONTINUED | OUTPATIENT
Start: 2022-04-19 | End: 2022-04-21 | Stop reason: HOSPADM

## 2022-04-19 RX ORDER — METOCLOPRAMIDE HYDROCHLORIDE 5 MG/ML
10 INJECTION INTRAMUSCULAR; INTRAVENOUS EVERY 6 HOURS PRN
Status: DISCONTINUED | OUTPATIENT
Start: 2022-04-19 | End: 2022-04-19 | Stop reason: HOSPADM

## 2022-04-19 RX ADMIN — DINOPROSTONE 10 MG: 10 INSERT VAGINAL at 18:30

## 2022-04-19 ASSESSMENT — ACTIVITIES OF DAILY LIVING (ADL)
ADLS_ACUITY_SCORE: 12
WALKING_OR_CLIMBING_STAIRS_DIFFICULTY: NO
DIFFICULTY_EATING/SWALLOWING: NO
DRESSING/BATHING_DIFFICULTY: NO
CHANGE_IN_FUNCTIONAL_STATUS_SINCE_ONSET_OF_CURRENT_ILLNESS/INJURY: NO
CONCENTRATING,_REMEMBERING_OR_MAKING_DECISIONS_DIFFICULTY: NO
DOING_ERRANDS_INDEPENDENTLY_DIFFICULTY: NO
WEAR_GLASSES_OR_BLIND: YES
FALL_HISTORY_WITHIN_LAST_SIX_MONTHS: NO
ADLS_ACUITY_SCORE: 12
TOILETING_ISSUES: NO
VISION_MANAGEMENT: CONTACTS

## 2022-04-19 NOTE — PLAN OF CARE
8603 - Dr. Rm paged and updated via phone re: patient's arrival, current complaints, medical and pregnancy history, admission assessment, serial BP's, lab results, FHT's and uterine activity.  Orders received to admit.  MD to enter orders.  Patient updated on POC and agrees.  Awaiting room availability before transfer to L&D

## 2022-04-19 NOTE — PLAN OF CARE
1338 - FHT's show reactive NST.  Monitors removed.    1430 - Patient transferred ambulatory to room 232.  Report given to LINO Farah RN who will assume cares.

## 2022-04-19 NOTE — PLAN OF CARE
SBAR report received from Gabriela MERA RN. Will assume all cares for this patient. All belongings gathered by pateint and patient escorted to room 232 for admission. Patient oriented to room, call light and plan of care. Patient's meal arrived so patient will eat prior to any further cares.

## 2022-04-19 NOTE — PLAN OF CARE
1119 -  at 38+4/7 weeks presents to [facility] from clinic for eval of elevated BP's.  POC discusse including monitoring, BP's, lab tests.  Patient verbally consents.  Monitors applied.  Admission assessment completed.  Patient denies symptoms of pre-eclampsia.  Urine collected and sent to lab.  Serial BP's cycling.

## 2022-04-19 NOTE — H&P
[facility] Labor and Delivery History and Physical    Nazia Smith MRN# [id number]   Age: 36 year old YOB: 1985     Date of Admission:  2022    Primary care provider: Katia Rm           HPI:   Nazia Smith is a 36 year old  at 38w4d admitted for MIL for PIH. Sent to LDR from triage given persistent BP in 140/90s in clinic at routine 38 week visit. Had normal SILVINA today and BPP 8/8. No symptoms. PIH labs today normal but BP persistently elevated in 150s/90s. Decision made to proceed with MIL.      Pregnancy c/b:  1. New onset PIH  2. AMA with normal Level 2, NIPT and AFP  3. Hx/o A fib in summer 2021- cardiology eval normal.   4. Hypothyroidism on replacement  5. Morbid obesity of pregnancy  6. Prior partial 3rd degree laceration with 10 lb 5 oz baby  7. APAS on lovenox prophylaxis (M does not feel formal diagnosis yet but recommended continuing to treat as such)          Pregnancy history:     OBSTETRIC HISTORY:  OB History    Para Term  AB Living   5 1 1 0 3 1   SAB IAB Ectopic Multiple Live Births   0 0 0 0 1      # Outcome Date GA Lbr Ky/2nd Weight Sex Delivery Anes PTL Lv   5 Current            4 Term 20 39w6d 11:06 / 01:27 4.66 kg (10 lb 4.4 oz) M Vag-Spont EPI N DELIA      Name: RUBIO SMITH-NAZIA      Apgar1: 8  Apgar5: 9   3 AB            2 AB            1 AB                EDC: Estimated Date of Delivery: 2022      Prenatal Labs:   Lab Results   Component Value Date    ABO O 2020    RH Pos 2020    AS Neg 2020    HEPBANG neg 2019    HGB 11.7 2022       GBS Status:   Lab Results   Component Value Date    GBS positive 2020           Maternal Past Medical History:     Past Medical History:   Diagnosis Date     Anti-phospholipid antibody syndrome (H)      Atrial fibrillation (H)      Dysmenorrhea      Hypertension     with second full term pregnancy     Obese      Thyroid disease      hypothyroid     Past Surgical History:   Procedure Laterality Date     DILATION AND CURETTAGE N/A 4/18/2018    Procedure: SUCTION DILATION AND CURETTAGE;  Surgeon: Roxanna Moody MD;  Location: [facility];  Service:      DILATION AND CURETTAGE, OPERATIVE HYSTEROSCOPY WITH MORCELLATOR, COMBINED N/A 1/21/2019    Procedure: HYSTEROSCOPY, POLYPECTOMY, POSSIBLE DILATION AND CURETTAGE (GUY NEPHEW MORCELLATOR AND FLUID MANAGEMENT);  Surgeon: Katia Rm MD;  Location:  OR     GYN SURGERY      D & C     TOE SURGERY        Medications Prior to Admission   Medication Sig Dispense Refill Last Dose     acetaminophen (TYLENOL) 325 MG tablet Take 2 tablets (650 mg) by mouth every 4 hours as needed for mild pain or fever 50 tablet 0 More than a month at Unknown time     amoxicillin-clavulanate (AUGMENTIN) 875-125 MG tablet TAKE 1 TABLET BY MOUTH TWICE DAILY WITH MEALS FOR 10 DAYS   More than a month at Unknown time     aspirin (ASA) 81 MG chewable tablet Take 81 mg by mouth daily   4/19/2022 at 0800     azithromycin (ZITHROMAX) 250 MG tablet TAKE 2 TABLETS BY MOUTH FOR 1 DAY THEN TAKE 1 TABLET BY MOUTH EVERY DAY FOR 4 DAYS   More than a month at Unknown time     enoxaparin ANTICOAGULANT (LOVENOX) 40 MG/0.4ML syringe    4/18/2022 at 2100     fluticasone (FLONASE) 50 MCG/ACT nasal spray USE 1 SPRAY IN EACH NOSTRIL TWICE DAILY FOR 10 DAYS   Past Month at Unknown time     levothyroxine (SYNTHROID/LEVOTHROID) 125 MCG tablet TAKE 1 TABLET BY MOUTH DAILY AS DIRECTED   4/19/2022 at 0800     predniSONE (DELTASONE) 20 MG tablet    Past Month at Unknown time     Prenatal Vit-Fe Fumarate-FA (PRENATAL PO)    4/18/2022 at 2100           Family History:   The family history includes Alcoholism in an other family member; Attention Deficit Disorder in an other family member; Bipolar Disorder in an other family member; Depression in an other family member.          Social History:     Social History     Tobacco Use      "Smoking status: Former Smoker     Quit date: 2018     Years since quittin.2     Smokeless tobacco: Never Used   Substance Use Topics     Alcohol use: Not Currently     Comment: 2/month            Review of Systems:   The Review of Systems is negative other than noted in the HPI          Physical Exam:     Patient Vitals for the past 8 hrs:   BP Temp Height Weight   22 1240 (!) 154/84 -- -- --   22 1220 (!) 157/90 -- -- --   22 1210 (!) 153/88 -- -- --   22 1200 (!) 159/105 -- -- --   22 1140 138/78 -- -- --   22 1128 -- -- 1.626 m (5' 4\") 130.6 kg (288 lb)   22 1120 -- 96.98  F (36.1  C) -- --     Gen: NAD  CV: normal  Resp: Unlabored  Abd: Gravid, NT  Ext: Trace edema, NT    Cervix: 1/50%/-3, mod, post  Membranes: intact  EFW: 7.5- 8 lbs  Presentation:Cephalic    NST  Fetal Heart Rate Tracing:   Baseline 130  Variability: mod  Accelerations: Present  Decelerations: None  Interpretation: reactive    Contractions: occ      Recent Results (from the past 5 hour(s))   Protein  random urine    Collection Time: 22 11:14 AM   Result Value Ref Range    Total Protein Random Urine g/L 0.19 g/L    Total Protein Urine g/gr Creatinine 0.11 0.00 - 0.20 g/g Cr    Creatinine Urine mg/dL 167 mg/dL   CBC with platelets    Collection Time: 22 11:17 AM   Result Value Ref Range    WBC Count 7.2 4.0 - 11.0 10e3/uL    RBC Count 3.83 3.80 - 5.20 10e6/uL    Hemoglobin 11.7 11.7 - 15.7 g/dL    Hematocrit 35.3 35.0 - 47.0 %    MCV 92 78 - 100 fL    MCH 30.5 26.5 - 33.0 pg    MCHC 33.1 31.5 - 36.5 g/dL    RDW 12.8 10.0 - 15.0 %    Platelet Count 276 150 - 450 10e3/uL   AST    Collection Time: 22 11:17 AM   Result Value Ref Range    AST 12 0 - 45 U/L   ALT    Collection Time: 22 11:17 AM   Result Value Ref Range    ALT 18 0 - 50 U/L   Creatinine    Collection Time: 22 11:17 AM   Result Value Ref Range    Creatinine 0.62 0.52 - 1.04 mg/dL    GFR Estimate >90 >60 " mL/min/1.73m2           Assessment:   Cherie Montgomery is a 36 year old  at 38w4d admitted for MIL for pregnancy induced hypertension.        Plan:   1. Admit to LDR  2. Fetal wellbeing: Category 1  3. PIH: No signs of preeclampsia but will closely monitor for this. PIH labs normal on admit.   4. Recommend proceed with MIL with cervidil. Start PCN for GBS at 0200 tomorrowor sooner if starts laboring.   5. GBS positive: PCN per protocol as above  6. APAS: MFM did not feel full diagnosis but prior was diagnosed. Has been on prophylactic lovenox. Last dose last night. Hold through labor and start 12-24 hours after delivery and continue x 6 weeks  7. Hypothyroidism on replacement  8. Afib this summer: No abnormalities since. Check CBC and BMP on admit per MFM- ordered.   9. COVID infection - home test- will test here  10. Desires support with breastfeeding- wants to avoid bottles    Katia Rm MD  2022  12:52 PM

## 2022-04-20 LAB — T PALLIDUM AB SER QL: NONREACTIVE

## 2022-04-20 PROCEDURE — 250N000011 HC RX IP 250 OP 636: Performed by: OBSTETRICS & GYNECOLOGY

## 2022-04-20 PROCEDURE — 250N000013 HC RX MED GY IP 250 OP 250 PS 637: Performed by: OBSTETRICS & GYNECOLOGY

## 2022-04-20 PROCEDURE — 120N000001 HC R&B MED SURG/OB

## 2022-04-20 PROCEDURE — 258N000003 HC RX IP 258 OP 636: Performed by: OBSTETRICS & GYNECOLOGY

## 2022-04-20 RX ORDER — OXYTOCIN/0.9 % SODIUM CHLORIDE 30/500 ML
1-24 PLASTIC BAG, INJECTION (ML) INTRAVENOUS CONTINUOUS
Status: DISCONTINUED | OUTPATIENT
Start: 2022-04-21 | End: 2022-04-21 | Stop reason: HOSPADM

## 2022-04-20 RX ORDER — MISOPROSTOL 100 UG/1
25 TABLET ORAL EVERY 4 HOURS PRN
Status: DISCONTINUED | OUTPATIENT
Start: 2022-04-20 | End: 2022-04-21 | Stop reason: HOSPADM

## 2022-04-20 RX ORDER — HYDROXYZINE HYDROCHLORIDE 25 MG/1
100 TABLET, FILM COATED ORAL
Status: DISCONTINUED | OUTPATIENT
Start: 2022-04-20 | End: 2022-04-21 | Stop reason: HOSPADM

## 2022-04-20 RX ORDER — SODIUM CHLORIDE, SODIUM LACTATE, POTASSIUM CHLORIDE, CALCIUM CHLORIDE 600; 310; 30; 20 MG/100ML; MG/100ML; MG/100ML; MG/100ML
INJECTION, SOLUTION INTRAVENOUS CONTINUOUS PRN
Status: DISCONTINUED | OUTPATIENT
Start: 2022-04-20 | End: 2022-04-21 | Stop reason: HOSPADM

## 2022-04-20 RX ORDER — LIDOCAINE 40 MG/G
CREAM TOPICAL
Status: DISCONTINUED | OUTPATIENT
Start: 2022-04-20 | End: 2022-04-21 | Stop reason: HOSPADM

## 2022-04-20 RX ADMIN — Medication 25 MCG: at 08:02

## 2022-04-20 RX ADMIN — Medication 25 MCG: at 20:02

## 2022-04-20 RX ADMIN — Medication 25 MCG: at 16:02

## 2022-04-20 RX ADMIN — Medication 25 MCG: at 12:00

## 2022-04-20 RX ADMIN — HYDROXYZINE HYDROCHLORIDE 100 MG: 25 TABLET, FILM COATED ORAL at 23:06

## 2022-04-20 RX ADMIN — MORPHINE SULFATE 10 MG: 10 INJECTION INTRAVENOUS at 23:06

## 2022-04-20 RX ADMIN — LEVOTHYROXINE SODIUM 125 MCG: 125 TABLET ORAL at 08:34

## 2022-04-20 RX ADMIN — SODIUM CHLORIDE, POTASSIUM CHLORIDE, SODIUM LACTATE AND CALCIUM CHLORIDE 1000 ML: 600; 310; 30; 20 INJECTION, SOLUTION INTRAVENOUS at 23:56

## 2022-04-20 RX ADMIN — SODIUM CITRATE AND CITRIC ACID MONOHYDRATE 30 ML: 500; 334 SOLUTION ORAL at 23:06

## 2022-04-20 NOTE — PROVIDER NOTIFICATION
04/20/22 0640   Provider Notification   Provider Name/Title Dr. Orlando   Method of Notification In Department   Request Evaluate in Person   Notification Reason Status Update     Shift update to Dr. Orlando at desk.  SVE 1/60/-3, soft, posterior, SIDHU 5. Per Dr. Orlando: plan for day nurse to update Dr. Philippe for plan of care.  Blood pressures WNL this shift. Cervidil removed at 0630. Cherie plans shower and breakfast at this time.

## 2022-04-20 NOTE — PROGRESS NOTES
Pt slept overnight had mild cramping with cervidil  Normal PIH labs yesterday  Vitals:    22 1600 22 1950 22 19522 06   BP: 132/72  133/80 138/71   BP Location:   Right arm Left arm   Patient Position:   Sitting Semi-Hernandez's   Cuff Size:   Adult Large Adult Large   Resp: 16  16 16   Temp: 97.3  F (36.3  C) 97.2  F (36.2  C)     TempSrc: Temporal  Temporal    SpO2: 96%      Weight:       Height:       SVE /-3 post cervidil  Membranes still intact    PLAN 37yo  at 38+5weeks  PIH-failed cervidil so will start vaginal cytotec  Will start PNC today for GBS pos  APAS will start lovenox after delivery  Hypothyroidism stable on meds  Afib with normal labs upon admit  CONSTANTIN when uncomfortable   AROM when favorable SVE and then will start pit  NVD-EFW 7.5-8#    Shari Philippe DO

## 2022-04-21 ENCOUNTER — ANESTHESIA EVENT (OUTPATIENT)
Dept: OBGYN | Facility: CLINIC | Age: 37
End: 2022-04-21
Payer: COMMERCIAL

## 2022-04-21 ENCOUNTER — ANESTHESIA (OUTPATIENT)
Dept: OBGYN | Facility: CLINIC | Age: 37
End: 2022-04-21
Payer: COMMERCIAL

## 2022-04-21 LAB
ERYTHROCYTE [DISTWIDTH] IN BLOOD BY AUTOMATED COUNT: 13 % (ref 10–15)
HCT VFR BLD AUTO: 35.3 % (ref 35–47)
HGB BLD-MCNC: 11.4 G/DL (ref 11.7–15.7)
MCH RBC QN AUTO: 30.2 PG (ref 26.5–33)
MCHC RBC AUTO-ENTMCNC: 32.3 G/DL (ref 31.5–36.5)
MCV RBC AUTO: 93 FL (ref 78–100)
PLATELET # BLD AUTO: 266 10E3/UL (ref 150–450)
RBC # BLD AUTO: 3.78 10E6/UL (ref 3.8–5.2)
WBC # BLD AUTO: 9.2 10E3/UL (ref 4–11)

## 2022-04-21 PROCEDURE — 722N000001 HC LABOR CARE VAGINAL DELIVERY SINGLE

## 2022-04-21 PROCEDURE — 0HQ9XZZ REPAIR PERINEUM SKIN, EXTERNAL APPROACH: ICD-10-PCS | Performed by: OBSTETRICS & GYNECOLOGY

## 2022-04-21 PROCEDURE — 250N000013 HC RX MED GY IP 250 OP 250 PS 637: Performed by: OBSTETRICS & GYNECOLOGY

## 2022-04-21 PROCEDURE — 250N000011 HC RX IP 250 OP 636: Performed by: OBSTETRICS & GYNECOLOGY

## 2022-04-21 PROCEDURE — 250N000011 HC RX IP 250 OP 636: Performed by: ANESTHESIOLOGY

## 2022-04-21 PROCEDURE — 120N000012 HC R&B POSTPARTUM

## 2022-04-21 PROCEDURE — 258N000003 HC RX IP 258 OP 636: Performed by: ANESTHESIOLOGY

## 2022-04-21 PROCEDURE — 85027 COMPLETE CBC AUTOMATED: CPT | Performed by: ANESTHESIOLOGY

## 2022-04-21 PROCEDURE — 370N000003 HC ANESTHESIA WARD SERVICE

## 2022-04-21 PROCEDURE — 36415 COLL VENOUS BLD VENIPUNCTURE: CPT | Performed by: ANESTHESIOLOGY

## 2022-04-21 PROCEDURE — 10907ZC DRAINAGE OF AMNIOTIC FLUID, THERAPEUTIC FROM PRODUCTS OF CONCEPTION, VIA NATURAL OR ARTIFICIAL OPENING: ICD-10-PCS | Performed by: OBSTETRICS & GYNECOLOGY

## 2022-04-21 RX ORDER — MISOPROSTOL 200 UG/1
400 TABLET ORAL
Status: DISCONTINUED | OUTPATIENT
Start: 2022-04-21 | End: 2022-04-23 | Stop reason: HOSPADM

## 2022-04-21 RX ORDER — EPHEDRINE SULFATE 50 MG/ML
5 INJECTION, SOLUTION INTRAMUSCULAR; INTRAVENOUS; SUBCUTANEOUS
Status: DISCONTINUED | OUTPATIENT
Start: 2022-04-21 | End: 2022-04-21 | Stop reason: HOSPADM

## 2022-04-21 RX ORDER — METHYLERGONOVINE MALEATE 0.2 MG/ML
200 INJECTION INTRAVENOUS
Status: DISCONTINUED | OUTPATIENT
Start: 2022-04-21 | End: 2022-04-23 | Stop reason: HOSPADM

## 2022-04-21 RX ORDER — MODIFIED LANOLIN
OINTMENT (GRAM) TOPICAL
Status: DISCONTINUED | OUTPATIENT
Start: 2022-04-21 | End: 2022-04-23 | Stop reason: HOSPADM

## 2022-04-21 RX ORDER — ROPIVACAINE HYDROCHLORIDE 2 MG/ML
10 INJECTION, SOLUTION EPIDURAL; INFILTRATION; PERINEURAL ONCE
Status: COMPLETED | OUTPATIENT
Start: 2022-04-21 | End: 2022-04-21

## 2022-04-21 RX ORDER — MISOPROSTOL 200 UG/1
800 TABLET ORAL
Status: DISCONTINUED | OUTPATIENT
Start: 2022-04-21 | End: 2022-04-23 | Stop reason: HOSPADM

## 2022-04-21 RX ORDER — ROPIVACAINE HYDROCHLORIDE 2 MG/ML
INJECTION, SOLUTION EPIDURAL; INFILTRATION; PERINEURAL
Status: COMPLETED | OUTPATIENT
Start: 2022-04-21 | End: 2022-04-21

## 2022-04-21 RX ORDER — ENOXAPARIN SODIUM 100 MG/ML
40 INJECTION SUBCUTANEOUS EVERY 24 HOURS
Status: DISCONTINUED | OUTPATIENT
Start: 2022-04-21 | End: 2022-04-23 | Stop reason: HOSPADM

## 2022-04-21 RX ORDER — NALBUPHINE HYDROCHLORIDE 10 MG/ML
2.5-5 INJECTION, SOLUTION INTRAMUSCULAR; INTRAVENOUS; SUBCUTANEOUS EVERY 6 HOURS PRN
Status: DISCONTINUED | OUTPATIENT
Start: 2022-04-21 | End: 2022-04-23 | Stop reason: HOSPADM

## 2022-04-21 RX ORDER — POLYETHYLENE GLYCOL 3350 17 G/17G
17 POWDER, FOR SOLUTION ORAL DAILY PRN
Status: DISCONTINUED | OUTPATIENT
Start: 2022-04-21 | End: 2022-04-23 | Stop reason: HOSPADM

## 2022-04-21 RX ORDER — HYDROCORTISONE 2.5 %
CREAM (GRAM) TOPICAL 3 TIMES DAILY PRN
Status: DISCONTINUED | OUTPATIENT
Start: 2022-04-21 | End: 2022-04-23 | Stop reason: HOSPADM

## 2022-04-21 RX ORDER — CARBOPROST TROMETHAMINE 250 UG/ML
250 INJECTION, SOLUTION INTRAMUSCULAR
Status: DISCONTINUED | OUTPATIENT
Start: 2022-04-21 | End: 2022-04-23 | Stop reason: HOSPADM

## 2022-04-21 RX ORDER — ACETAMINOPHEN 325 MG/1
650 TABLET ORAL EVERY 4 HOURS PRN
Status: DISCONTINUED | OUTPATIENT
Start: 2022-04-21 | End: 2022-04-23 | Stop reason: HOSPADM

## 2022-04-21 RX ORDER — TRANEXAMIC ACID 10 MG/ML
1 INJECTION, SOLUTION INTRAVENOUS EVERY 30 MIN PRN
Status: DISCONTINUED | OUTPATIENT
Start: 2022-04-21 | End: 2022-04-23 | Stop reason: HOSPADM

## 2022-04-21 RX ORDER — IBUPROFEN 400 MG/1
800 TABLET, FILM COATED ORAL EVERY 6 HOURS PRN
Status: DISCONTINUED | OUTPATIENT
Start: 2022-04-21 | End: 2022-04-23 | Stop reason: HOSPADM

## 2022-04-21 RX ORDER — BISACODYL 10 MG
10 SUPPOSITORY, RECTAL RECTAL DAILY PRN
Status: DISCONTINUED | OUTPATIENT
Start: 2022-04-21 | End: 2022-04-23 | Stop reason: HOSPADM

## 2022-04-21 RX ORDER — DOCUSATE SODIUM 100 MG/1
100 CAPSULE, LIQUID FILLED ORAL DAILY
Status: DISCONTINUED | OUTPATIENT
Start: 2022-04-21 | End: 2022-04-23 | Stop reason: HOSPADM

## 2022-04-21 RX ORDER — OXYTOCIN/0.9 % SODIUM CHLORIDE 30/500 ML
340 PLASTIC BAG, INJECTION (ML) INTRAVENOUS CONTINUOUS PRN
Status: DISCONTINUED | OUTPATIENT
Start: 2022-04-21 | End: 2022-04-23 | Stop reason: HOSPADM

## 2022-04-21 RX ORDER — OXYTOCIN 10 [USP'U]/ML
10 INJECTION, SOLUTION INTRAMUSCULAR; INTRAVENOUS
Status: DISCONTINUED | OUTPATIENT
Start: 2022-04-21 | End: 2022-04-23 | Stop reason: HOSPADM

## 2022-04-21 RX ADMIN — IBUPROFEN 800 MG: 400 TABLET ORAL at 19:16

## 2022-04-21 RX ADMIN — ACETAMINOPHEN 650 MG: 325 TABLET ORAL at 05:20

## 2022-04-21 RX ADMIN — PENICILLIN G POTASSIUM 5 MILLION UNITS: 5000000 POWDER, FOR SOLUTION INTRAMUSCULAR; INTRAPLEURAL; INTRATHECAL; INTRAVENOUS at 00:24

## 2022-04-21 RX ADMIN — ACETAMINOPHEN 650 MG: 325 TABLET ORAL at 19:16

## 2022-04-21 RX ADMIN — POLYETHYLENE GLYCOL 3350 17 G: 17 POWDER, FOR SOLUTION ORAL at 22:15

## 2022-04-21 RX ADMIN — ROPIVACAINE HYDROCHLORIDE 10 ML: 2 INJECTION, SOLUTION EPIDURAL; INFILTRATION at 01:03

## 2022-04-21 RX ADMIN — ACETAMINOPHEN 650 MG: 325 TABLET ORAL at 12:42

## 2022-04-21 RX ADMIN — DOCUSATE SODIUM 100 MG: 100 CAPSULE, LIQUID FILLED ORAL at 08:42

## 2022-04-21 RX ADMIN — BUPIVACAINE HYDROCHLORIDE 12 ML/HR: 7.5 INJECTION, SOLUTION EPIDURAL; RETROBULBAR at 00:52

## 2022-04-21 RX ADMIN — ENOXAPARIN SODIUM 40 MG: 40 INJECTION SUBCUTANEOUS at 14:40

## 2022-04-21 RX ADMIN — IBUPROFEN 800 MG: 400 TABLET ORAL at 05:20

## 2022-04-21 RX ADMIN — BENZOCAINE: 11.4 AEROSOL, SPRAY TOPICAL at 21:03

## 2022-04-21 RX ADMIN — IBUPROFEN 800 MG: 400 TABLET ORAL at 12:42

## 2022-04-21 RX ADMIN — ROPIVACAINE HYDROCHLORIDE 10 ML: 2 INJECTION, SOLUTION EPIDURAL; INFILTRATION at 00:57

## 2022-04-21 RX ADMIN — LEVOTHYROXINE SODIUM 125 MCG: 125 TABLET ORAL at 08:42

## 2022-04-21 ASSESSMENT — ACTIVITIES OF DAILY LIVING (ADL)
ADLS_ACUITY_SCORE: 4

## 2022-04-21 ASSESSMENT — ENCOUNTER SYMPTOMS: DYSRHYTHMIAS: 1

## 2022-04-21 NOTE — PROVIDER NOTIFICATION
04/20/22 1888   Provider Notification   Provider Name/Title Dr. Philippe   Method of Notification Phone   Request Evaluate - Remote   Notification Reason Status Update;SVE;Pain       Update to Dr. Philippe via telephone re SVE soft, posterior, 2/70/-2, mason 8.  Telephone order with readback for oxytocin induction after 0000.  Begin GBS antibiotics. Ok for epidural and house catheter when requested by pt.  Continue with plan of care.

## 2022-04-21 NOTE — ANESTHESIA PREPROCEDURE EVALUATION
Anesthesia Pre-Procedure Evaluation    Patient: Cherie Montgomery   MRN: [id number] : 1985        Procedure :           Past Medical History:   Diagnosis Date     Anti-phospholipid antibody syndrome (H)      Atrial fibrillation (H)      Dysmenorrhea      Hypertension     with second full term pregnancy     Obese      Thyroid disease     hypothyroid      Past Surgical History:   Procedure Laterality Date     DILATION AND CURETTAGE N/A 2018    Procedure: SUCTION DILATION AND CURETTAGE;  Surgeon: Roxanna Moody MD;  Location: [facility];  Service:      DILATION AND CURETTAGE, OPERATIVE HYSTEROSCOPY WITH MORCELLATOR, COMBINED N/A 2019    Procedure: HYSTEROSCOPY, POLYPECTOMY, POSSIBLE DILATION AND CURETTAGE ([employer] MORCELLATOR AND FLUID MANAGEMENT);  Surgeon: Katia Rm MD;  Location:  OR     GYN SURGERY      D & C     TOE SURGERY        No Known Allergies   Social History     Tobacco Use     Smoking status: Former Smoker     Quit date: 2018     Years since quittin.2     Smokeless tobacco: Never Used   Substance Use Topics     Alcohol use: Not Currently     Comment: 2/month      Wt Readings from Last 1 Encounters:   22 130.6 kg (288 lb)        Anesthesia Evaluation            ROS/MED HX  ENT/Pulmonary:    (-) asthma   Neurologic:  - neg neurologic ROS     Cardiovascular:     (+) hypertension-----dysrhythmias,  (-) PIH   METS/Exercise Tolerance:     Hematologic:     (+) no anticoagulation therapy, no coagulopathy,     Musculoskeletal:       GI/Hepatic:     (+) GERD,     Renal/Genitourinary:       Endo:     (+) thyroid problem, Obesity,     Psychiatric/Substance Use:       Infectious Disease:       Malignancy:       Other:            Physical Exam    Airway        Mallampati: II   TM distance: > 3 FB   Neck ROM: full     Respiratory Devices and Support         Dental  no notable dental history         Cardiovascular   cardiovascular exam normal          Pulmonary    pulmonary exam normal                OUTSIDE LABS:  CBC:   Lab Results   Component Value Date    WBC 7.2 04/19/2022    WBC 6.6 04/11/2022    HGB 11.7 04/19/2022    HGB 11.3 (L) 04/11/2022    HCT 35.3 04/19/2022    HCT 33.7 (L) 04/11/2022     04/19/2022     04/11/2022     BMP:   Lab Results   Component Value Date     04/19/2022    POTASSIUM 4.0 04/19/2022    CHLORIDE 107 04/19/2022    CO2 21 04/19/2022    BUN 7 04/19/2022    BUN 9 08/12/2020    CR 0.62 04/19/2022    CR 0.61 04/19/2022    GLC 88 04/19/2022     COAGS:   Lab Results   Component Value Date    PTT 30 08/12/2020    INR 0.99 08/12/2020     POC: No results found for: BGM, HCG, HCGS  HEPATIC:   Lab Results   Component Value Date    ALT 18 04/19/2022    AST 12 04/19/2022     OTHER:   Lab Results   Component Value Date    BRIEN 8.8 04/19/2022    TSH 2.47 07/22/2019       Anesthesia Plan    ASA Status:  2      Anesthesia Type: Epidural.              Consents    Anesthesia Plan(s) and associated risks, benefits, and realistic alternatives discussed. Questions answered and patient/representative(s) expressed understanding.    - Discussed:     - Discussed with:  Patient         Postoperative Care            Comments:    Other Comments: Orders to manage the epidural infusion have been entered, and through coordination with the nurse, we will continute to manage and monitor the patient's labor epidural.  We will continuously be available to adjust as needed thruout the entire L&D process.             Lucy Antunez

## 2022-04-21 NOTE — PROCEDURES
DELIVERY SUMMARY:  Date: 2022     HISTORY:  Cherie Montgomery is a 36 year old  at 38w6d gestation. Prenatal course complicated by PIH, AMA, hypothyriod, obesity, APAS. GBS positive.  She is Rh positive and Rubella immune.      FIRST STAGE:  She presented to labor and delivery with MIL for HTN.  Amniotic fluid was noted to be clear at the time of 0035.  She progressed to complete at 0203.  Epidural analgesia.    SECOND STAGE:   Fetal heart tones were reassuring during the second stage of labor.  Head delivered ALANNA over intact perineum.  Loose nuchal cord. Shoulders were delivered without difficulty and the remainder of the body followed.  The male infant was placed directly on the maternal abdomen and the infant was bulb suctioned.  Cord clamping was delayed 60 seconds after which the cord was clamped and cut.  Cord blood was obtained.  IV Pitocin was given through running IV.  Apgar 7 at 1 minute and 9 at 5 minutes.  Weight 7# 5oz.    THIRD STAGE:  Pitocin was administered after delivery of the infant.  The placenta delivered spontaneously with gentle cord traction.  A first degree perineal laceration was repaired with 3-0 chromic suture in the usual fashion.  The uterus was noted to be firm.  Sponge and needle counts were correct.  Quantitated blood loss was 50 cc.  Mom and baby doing well and stable to transfer to postpartum recovery.    Shari Philippe, DO  Obstetrics, Gynecology, and Infertility

## 2022-04-21 NOTE — PROGRESS NOTES
"OB PROGRESS NOTE    Postpartum Day 0: Vaginal Delivery (delivered at 0230 this morning)    SUBJECTIVE  Feels well. Pain is well controlled with Tylenol and Ibuprofen.  She has no complaints.  She is urinating, tolerating a general diet and ambulating without difficulty.  Breast feeding is going well.  Lochia is moderate.  She denies emotional concerns.      MIL for gestational hypertension, not on meds.    EXAM  BP (!) 144/82   Pulse 83   Temp 99.7  F (37.6  C) (Oral)   Resp 16   Ht 1.626 m (5' 4\")   Wt 130.6 kg (288 lb)   LMP 07/23/2021   SpO2 98%   Breastfeeding Unknown   BMI 49.44 kg/m      GENERAL APPEARANCE:  normal affect  ABDOMEN: soft, nontender, fundus firm below the umbilicus    Recent Results (from the past 2016 hour(s))   Protein  random urine    Collection Time: 04/11/22  2:08 PM   Result Value Ref Range    Total Protein Random Urine g/L 0.08 g/L    Total Protein Urine g/gr Creatinine 0.12 0.00 - 0.20 g/g Cr    Creatinine Urine mg/dL 68 mg/dL   CBC with platelets    Collection Time: 04/11/22  3:13 PM   Result Value Ref Range    WBC Count 6.6 4.0 - 11.0 10e3/uL    RBC Count 3.70 (L) 3.80 - 5.20 10e6/uL    Hemoglobin 11.3 (L) 11.7 - 15.7 g/dL    Hematocrit 33.7 (L) 35.0 - 47.0 %    MCV 91 78 - 100 fL    MCH 30.5 26.5 - 33.0 pg    MCHC 33.5 31.5 - 36.5 g/dL    RDW 12.8 10.0 - 15.0 %    Platelet Count 271 150 - 450 10e3/uL   AST    Collection Time: 04/11/22  3:13 PM   Result Value Ref Range    AST 18 0 - 45 U/L   ALT    Collection Time: 04/11/22  3:13 PM   Result Value Ref Range    ALT 17 0 - 50 U/L   Creatinine    Collection Time: 04/11/22  3:13 PM   Result Value Ref Range    Creatinine 0.56 0.52 - 1.04 mg/dL    GFR Estimate >90 >60 mL/min/1.73m2   Uric acid    Collection Time: 04/11/22  3:13 PM   Result Value Ref Range    Uric Acid 3.8 2.6 - 6.0 mg/dL   Protein  random urine    Collection Time: 04/19/22 11:14 AM   Result Value Ref Range    Total Protein Random Urine g/L 0.19 g/L    Total Protein " Urine g/gr Creatinine 0.11 0.00 - 0.20 g/g Cr    Creatinine Urine mg/dL 167 mg/dL   Treponema Abs w Reflex to RPR and Titer    Collection Time: 04/19/22 11:16 AM   Result Value Ref Range    Treponema Antibody Total Nonreactive Nonreactive   Adult Type and Screen    Collection Time: 04/19/22 11:16 AM   Result Value Ref Range    ABO/RH(D) O POS     Antibody Screen Negative Negative    SPECIMEN EXPIRATION DATE [id number]    CBC with platelets    Collection Time: 04/19/22 11:17 AM   Result Value Ref Range    WBC Count 7.2 4.0 - 11.0 10e3/uL    RBC Count 3.83 3.80 - 5.20 10e6/uL    Hemoglobin 11.7 11.7 - 15.7 g/dL    Hematocrit 35.3 35.0 - 47.0 %    MCV 92 78 - 100 fL    MCH 30.5 26.5 - 33.0 pg    MCHC 33.1 31.5 - 36.5 g/dL    RDW 12.8 10.0 - 15.0 %    Platelet Count 276 150 - 450 10e3/uL   AST    Collection Time: 04/19/22 11:17 AM   Result Value Ref Range    AST 12 0 - 45 U/L   ALT    Collection Time: 04/19/22 11:17 AM   Result Value Ref Range    ALT 18 0 - 50 U/L   Creatinine    Collection Time: 04/19/22 11:17 AM   Result Value Ref Range    Creatinine 0.62 0.52 - 1.04 mg/dL    GFR Estimate >90 >60 mL/min/1.73m2   Basic metabolic panel    Collection Time: 04/19/22 11:17 AM   Result Value Ref Range    Sodium 134 133 - 144 mmol/L    Potassium 4.0 3.4 - 5.3 mmol/L    Chloride 107 94 - 109 mmol/L    Carbon Dioxide (CO2) 21 20 - 32 mmol/L    Anion Gap 6 3 - 14 mmol/L    Urea Nitrogen 7 7 - 30 mg/dL    Creatinine 0.61 0.52 - 1.04 mg/dL    Calcium 8.8 8.5 - 10.1 mg/dL    Glucose 88 70 - 99 mg/dL    GFR Estimate >90 >60 mL/min/1.73m2   CBC with platelets    Collection Time: 04/21/22 12:30 AM   Result Value Ref Range    WBC Count 9.2 4.0 - 11.0 10e3/uL    RBC Count 3.78 (L) 3.80 - 5.20 10e6/uL    Hemoglobin 11.4 (L) 11.7 - 15.7 g/dL    Hematocrit 35.3 35.0 - 47.0 %    MCV 93 78 - 100 fL    MCH 30.2 26.5 - 33.0 pg    MCHC 32.3 31.5 - 36.5 g/dL    RDW 13.0 10.0 - 15.0 %    Platelet Count 266 150 - 450 10e3/uL    ]    ASSESSMENT  Cherie Montgomery is a 36 year old  PPD# 0 s/p  at 38+6 weeks gestation after MIL for gestational hypertension.  Doing well this morning.    PLAN    1) Routine post-partum cares. Encourage ambulation.    2) Gestational hypertension, now delivered.  Will keep a close eye on BP's in these first 12 hours and then decide if an antihypertensive med is needed.  Discussed PIH symptoms with patient.  PIH labs on admission were normal.  3) Rh+.  Rhogam not indicated  3) Pertussis vaccine to be given if indicated  4) Anticipate discharge in 1-2 days.  Will need follow up in clinic in 1 week for BP check.      Mireille Jackman MD  Obstetrics, Gynecology and Infertility

## 2022-04-21 NOTE — PLAN OF CARE
Vitals stable. Up ad jude well. Voiding without difficulty. Pain controlled with tylenol and ibuprofen. Breastfeeding going well. Infant sleepy at times. Saline lock removed. Encouraged to complete depression screen and birth certificate.

## 2022-04-21 NOTE — PROVIDER NOTIFICATION
04/20/22 2249   Provider Notification   Provider Name/Title Dr. Philippe   Method of Notification Phone   Request Evaluate - Remote   Notification Reason Status Update     Update to Dr. Philippe via phone.  SVE unchanged at 2000, plan to call overnight if status changes. Continue with plan of care.

## 2022-04-21 NOTE — PROVIDER NOTIFICATION
04/20/22 1940   Provider Notification   Provider Name/Title Dr. Philippe   Method of Notification Phone   Request Evaluate-Remote   Notification Reason Status Update     Plan to continue with vaginal cytotec until more cervical change.  Verbal orders for Vistaril and IM Morphine if needed for pain and sleep.  Notified of severe range pressure, rechecked and pressure WNL. Continue with plan of care.

## 2022-04-21 NOTE — ANESTHESIA PROCEDURE NOTES
Epidural catheter Procedure Note    Pre-Procedure   Staff -        Anesthesiologist:  Lucy Antunez       Performed By: anesthesiologist       Location: OB       Pre-Anesthestic Checklist: patient identified, IV checked, site marked, risks and benefits discussed, informed consent, monitors and equipment checked, pre-op evaluation, at physician/surgeon's request and post-op pain management  Timeout:       Correct Patient: Yes        Correct Procedure: Yes        Correct Site: Yes        Correct Position: Yes   Procedure Documentation  Procedure: epidural catheter       Patient Position: sitting       Patient Prep/Sterile Barriers: sterile gloves, patient draped       Skin prep: Betadine       Local skin infiltrated with 3 mL of 1% lidocaine.        Insertion Site: L3-4. (midline approach).       Technique: LORT saline        UMA at 5 cm.       Needle Type: REQQI needle       Needle Gauge: 18.        Needle Length (Inches): 3.5           Catheter threaded easily.         3 cm epidural space.         Threaded 8 cm at skin.         # of attempts: 1 and  # of redirects:  0    Assessment/Narrative         Paresthesias: No.       Test dose of mL lidocaine 1.5% w/ 1:200,000 epinephrine at.         Test dose negative, 3 minutes after injection, for signs of intravascular, subdural, or intrathecal injection.       Insertion/Infusion Method: LORT saline       Aspiration negative for Heme or CSF via Epidural Catheter.    Medication(s) Administered   0.2% Ropivacaine (Epidural) - EPIDURAL   10 mL - 4/21/2022 1:03:00 AM   Comments:  Pt tolerated procedure well.   Patient placed in supine + MARSHA position post-procedure.   FHTs stable post-procedure.

## 2022-04-21 NOTE — PLAN OF CARE
Patient admitted into room 414 per wheelchair at 0455 accompanied by , , and L&D RN. Oriented to room, call light, safety measures, and postpartum routines. Verified  bands with parents and L&D RN. Plan of care and teaching initiated, answered questions.   Vital signs stable, /84 temp 99.7 orally, O2 sats on RA 98%, voided x1, ice and tucks to perineum prn, FFU/1 scant rubra lochia, +1 LE edema bilaterally, denies HA, epigastric pain or change in visual status, IV SL'd, able to ambulate without dizziness, able to rest, pain well controlled with medications, breast feeding  skin-to-skin on demand.  is here and is supportive.

## 2022-04-21 NOTE — PROGRESS NOTES
Transferred in wheelchair to room 414.  Fundus checked and verified with Mago JEWELL RN.  Ice pack applied to perineum.  Cares relinquished.

## 2022-04-22 PROCEDURE — 120N000012 HC R&B POSTPARTUM

## 2022-04-22 PROCEDURE — 250N000013 HC RX MED GY IP 250 OP 250 PS 637: Performed by: OBSTETRICS & GYNECOLOGY

## 2022-04-22 PROCEDURE — 250N000011 HC RX IP 250 OP 636: Performed by: OBSTETRICS & GYNECOLOGY

## 2022-04-22 RX ORDER — ENOXAPARIN SODIUM 100 MG/ML
40 INJECTION SUBCUTANEOUS DAILY
Qty: 11.2 ML | Refills: 0 | Status: SHIPPED | OUTPATIENT
Start: 2022-04-22 | End: 2022-05-20

## 2022-04-22 RX ORDER — IBUPROFEN 600 MG/1
600 TABLET, FILM COATED ORAL EVERY 6 HOURS PRN
Qty: 60 TABLET | Refills: 1 | Status: SHIPPED | OUTPATIENT
Start: 2022-04-22 | End: 2023-01-11

## 2022-04-22 RX ADMIN — ACETAMINOPHEN 650 MG: 325 TABLET ORAL at 20:47

## 2022-04-22 RX ADMIN — ENOXAPARIN SODIUM 40 MG: 40 INJECTION SUBCUTANEOUS at 14:43

## 2022-04-22 RX ADMIN — ACETAMINOPHEN 650 MG: 325 TABLET ORAL at 08:59

## 2022-04-22 RX ADMIN — DOCUSATE SODIUM 100 MG: 100 CAPSULE, LIQUID FILLED ORAL at 08:59

## 2022-04-22 RX ADMIN — ACETAMINOPHEN 650 MG: 325 TABLET ORAL at 01:26

## 2022-04-22 RX ADMIN — ACETAMINOPHEN 650 MG: 325 TABLET ORAL at 14:43

## 2022-04-22 RX ADMIN — IBUPROFEN 800 MG: 400 TABLET ORAL at 14:43

## 2022-04-22 RX ADMIN — IBUPROFEN 800 MG: 400 TABLET ORAL at 08:59

## 2022-04-22 RX ADMIN — IBUPROFEN 800 MG: 400 TABLET ORAL at 01:26

## 2022-04-22 RX ADMIN — LEVOTHYROXINE SODIUM 125 MCG: 125 TABLET ORAL at 08:59

## 2022-04-22 RX ADMIN — IBUPROFEN 800 MG: 400 TABLET ORAL at 20:47

## 2022-04-22 ASSESSMENT — ACTIVITIES OF DAILY LIVING (ADL)
ADLS_ACUITY_SCORE: 4

## 2022-04-22 NOTE — PROGRESS NOTES
"OB PROGRESS NOTE    Postpartum Day 1: Vaginal Delivery    SUBJECTIVE  Feels well. Pain is well controlled with Tylenol and Ibuprofen.  She has no complaints.  She is urinating, tolerating a general diet and ambulating without difficulty.  Breast feeding is going well.  Minimal bleeding.  Just learned that her son at home has a cold.  She denies emotional concerns.      MIL for gestational hypertension, not on meds.    EXAM  /78 (BP Location: Left arm)   Pulse 90   Temp 97.7  F (36.5  C) (Oral)   Resp 16   Ht 1.626 m (5' 4\")   Wt 130.6 kg (288 lb)   LMP 2021   SpO2 98%   Breastfeeding Unknown   BMI 49.44 kg/m      GENERAL APPEARANCE:  normal affect  ABDOMEN: soft, nontender, fundus firm below the umbilicus  LE: 2+ edema    Recent Labs   Lab 22  1117      POTASSIUM 4.0   CHLORIDE 107   BRIEN 8.8   CO2 21   BUN 7   CR 0.61  0.62   AST 12   ALT 18     Recent Labs   Lab 22  0030 22  1117   WBC 9.2 7.2   RBC 3.78* 3.83   HGB 11.4* 11.7   HCT 35.3 35.3    276         ASSESSMENT  Cherie Montgomery is a 36 year old  PPD# 1 s/p  at 38+6 weeks gestation after MIL for gestational hypertension.  Doing well, blood pressures are mostly normal.     PLAN    1) Routine post-partum cares. Encourage ambulation.    2) Gestational hypertension, now delivered.  Has not needed a HTN med.   3) Rh+.  Rhogam not indicated  3) Pertussis vaccine to be given if indicated  4) Anticipate discharge later today if BPs remain normal or tomorrow.    Melani Bush MD  Obstetrics, Gynecology, and Infertility  2022      Addendum: I saw patient socially and agree with above. She has to stay overnight related to baby needing antibiotics for not fully treated GBS positive. Will stay on Lovenox for APAS x 6 weeks. She will check BP at local clinic early next week and call if >140.90.  KS  "

## 2022-04-22 NOTE — PROVIDER NOTIFICATION
Patient requesting extra stool softner. MD Caldwell ordered prn once daily miralax.     Bill 88001 For Specimen Handling/Conveyance To Laboratory?: no Notification Instructions: Patient will be notified of biopsy results. However, patient instructed to call the office if not contacted within 2 weeks. Type Of Destruction Used: Curettage Billing Type: Third-Party Bill Post-Care Instructions: I reviewed with the patient in detail post-care instructions. Patient is to keep the biopsy site dry overnight, and then apply bacitracin twice daily until healed. Patient may apply hydrogen peroxide soaks to remove any crusting. Cryotherapy Text: The wound bed was treated with cryotherapy after the biopsy was performed. Curettage Text: The wound bed was treated with curettage after the biopsy was performed. Hemostasis: Ferric chloride Consent: Written consent was obtained and risks were reviewed including but not limited to scarring, infection, bleeding, scabbing, incomplete removal, nerve damage and allergy to anesthesia. X Size Of Lesion In Cm: 0 Dressing: bandage Anesthesia Volume In Cc: 0.5 Biopsy Method: Dermablade Wound Care: Vaseline Electrodesiccation Text: The wound bed was treated with electrodesiccation after the biopsy was performed. Electrodesiccation And Curettage Text: The wound bed was treated with electrodesiccation and curettage after the biopsy was performed. Silver Nitrate Text: The wound bed was treated with silver nitrate after the biopsy was performed. Depth Of Biopsy: dermis Detail Level: Detailed Anesthesia Type: 1% lidocaine with epinephrine and a 1:10 solution of 8.4% sodium bicarbonate Was A Bandage Applied: Yes Biopsy Type: H and E Size Of Lesion In Cm: 0.7

## 2022-04-22 NOTE — PLAN OF CARE
Vitals stable. Up ad jude well. Voiding without difficulty. Pain controlled with tylenol and ibuprofen. Breastfeeding going well. Depression screen and birth certificate completed.    immune

## 2022-04-22 NOTE — PROVIDER NOTIFICATION
04/22/22 0528   Provider Notification   Provider Name/Title Yasir Pandya   Method of Notification Phone   Request Evaluate-Remote   Notification Reason Vital Signs Change  (bp 145/92 asymptomatic)     /92 denies signs of pre-eclampsia. Patient has been up to the bathroom and up to the chair frequently in night to breastfeed. Previous blood pressured had been similar to this blood pressure. Bp was talking with patient in rocking chair breastfeeding. MD Moy was notified. Wanted the call parameters for blood pressure to be 160/105. Will have rounder see in morning. Will continue to monitor.

## 2022-04-22 NOTE — PLAN OF CARE
Vital signs stable. Postpartum assessment WDL denies signs of preeclampsia. Pain controlled with tylenol and ibuprofen. Patient voiding without difficulty. Breastfeeding on cue . Patient and infant bonding well. Will continue with current plan of care.

## 2022-04-22 NOTE — PLAN OF CARE
VSS, fundus firm, light flow. Wearing IP and Tucks. Breastfeeding her baby boy. Baby had been sleepy at breast, assisted mom to wake him and he latched and fed well. /83. Started Lovenox this afternoon.

## 2022-04-23 VITALS
TEMPERATURE: 98.5 F | DIASTOLIC BLOOD PRESSURE: 106 MMHG | BODY MASS INDEX: 49.17 KG/M2 | OXYGEN SATURATION: 98 % | WEIGHT: 288 LBS | HEIGHT: 64 IN | RESPIRATION RATE: 16 BRPM | HEART RATE: 82 BPM | SYSTOLIC BLOOD PRESSURE: 149 MMHG

## 2022-04-23 PROCEDURE — 250N000013 HC RX MED GY IP 250 OP 250 PS 637: Performed by: OBSTETRICS & GYNECOLOGY

## 2022-04-23 RX ADMIN — ACETAMINOPHEN 650 MG: 325 TABLET ORAL at 02:39

## 2022-04-23 RX ADMIN — DOCUSATE SODIUM 100 MG: 100 CAPSULE, LIQUID FILLED ORAL at 08:57

## 2022-04-23 RX ADMIN — IBUPROFEN 800 MG: 400 TABLET ORAL at 08:56

## 2022-04-23 RX ADMIN — ACETAMINOPHEN 650 MG: 325 TABLET ORAL at 08:56

## 2022-04-23 RX ADMIN — LEVOTHYROXINE SODIUM 125 MCG: 125 TABLET ORAL at 07:20

## 2022-04-23 RX ADMIN — IBUPROFEN 800 MG: 400 TABLET ORAL at 02:40

## 2022-04-23 ASSESSMENT — ACTIVITIES OF DAILY LIVING (ADL)
ADLS_ACUITY_SCORE: 4

## 2022-04-23 NOTE — PLAN OF CARE
Vital signs stable.except /106 denies any signs and symptoms  pt stated  passing golf size clot  fundus firm scent flow  MD updated  Postpartum assessment WDL. Pain controlled with tylenol and motrin  Patient up ambulating voiding without difficulty. Breastfeeding cluster feeding  Patient and infant bonding well.dischrge today  Will continue with current plan of care.

## 2022-04-23 NOTE — PLAN OF CARE
Vital signs stable. Postpartum assessment WDL. Pain controlled with tylenol and motrin . Patient up ambulating voiding without difficulty. Breastfeeding well per mom . Patient and infant bonding well. Will continue with current plan of care.

## 2022-04-23 NOTE — PLAN OF CARE
D: VSS, assessments WDL.   I: Pt. received complete discharge paperwork and home medications as filled by discharge pharmacy.  Pt. was given times of last dose for all discharge medications in writing on discharge medication sheets.  Discharge teaching included home medication, pain management, activity restrictions, postpartum cares, and signs and symptoms of infection.    A: Discharge outcomes on care plan met.  Mother states understanding and comfort with self care and follow up care.   P: Pt. Discharged.  Pt. was accompanied by Spouse and left with personal belongings.   Pt. to follow up with OB provider per discharge instructions.  Pt. had no further questions at the time of discharge and no unmet needs were identified.

## 2022-04-23 NOTE — PROVIDER NOTIFICATION
04/23/22 1130   Provider Notification   Provider Name/Title Dr Bush   Method of Notification Phone   Request Evaluate-Remote   Notification Reason Vital Signs Change;Status Update     MD notified with /106  also pt passed golf size clot fundus firm @U/1 scent flow  denies any other signs and symptoms, declined labs draw today and wants early discharge no new orders OK to discharge early without seeing MD today follow up in a week for BP check  updated the patient no questions and agree with plan .

## 2022-04-23 NOTE — PLAN OF CARE
VSS. Patient ambulating free of dizziness. Voiding without difficulty. Pain managed on tylenol and ibuprofen. Working on breastfeeding every 2-3 hours. Discussed supplementing with mom regarding baby's bili and weight loss, declined given baby breastfeeding well and milk coming in. Per mom, supplementing with her last really impacted breastfeeding and resulted in low milk supply so would prefer to avoid it unless necessary. Attentive to and bonding well with infant. Will continue to monitor.

## 2022-04-23 NOTE — ANESTHESIA POSTPROCEDURE EVALUATION
Patient: Cherie Montgomery    Procedure: * No procedures listed *       Anesthesia Type:  Epidural    Note:  Disposition: Inpatient   Postop Pain Control: Uneventful            Sign Out: Well controlled pain   PONV: No   Neuro/Psych: Uneventful            Sign Out: Acceptable/Baseline neuro status   Airway/Respiratory: Uneventful            Sign Out: Acceptable/Baseline resp. status   CV/Hemodynamics: Uneventful            Sign Out: Acceptable CV status; No obvious hypovolemia; No obvious fluid overload   Other NRE: NONE   DID A NON-ROUTINE EVENT OCCUR? No    Event details/Postop Comments:  Patient discharged. Chart reviewed and assessed; no apparent complications identified.    Eduard Brady MD           Last vitals:  Vitals Value Taken Time   BP     Temp     Pulse     Resp     SpO2         Electronically Signed By: Eduard Brady MD  April 22, 2022  10:03 PM

## 2022-04-25 NOTE — DISCHARGE SUMMARY
Spoke to RN over the phone.  Reviewed BPs.  OK to discharge, follow up for blood pressure check within 1 week.   Melani Bush MD  Obstetrics, Gynecology, and Infertility  04/23/2022

## 2022-04-28 ENCOUNTER — ALLIED HEALTH/NURSE VISIT (OUTPATIENT)
Dept: FAMILY MEDICINE | Facility: CLINIC | Age: 37
End: 2022-04-28
Payer: COMMERCIAL

## 2022-04-28 VITALS — SYSTOLIC BLOOD PRESSURE: 130 MMHG | DIASTOLIC BLOOD PRESSURE: 86 MMHG | OXYGEN SATURATION: 97 % | HEART RATE: 77 BPM

## 2022-04-28 DIAGNOSIS — Z33.1 PREGNANCY, INCIDENTAL: Primary | ICD-10-CM

## 2022-04-28 NOTE — PROGRESS NOTES
Cherie Montgomery is a 36 year old patient who comes in today for a Blood Pressure check.  Initial BP:  /86 (BP Location: Left arm, Patient Position: Left side, Cuff Size: Adult Large)   Pulse 77   LMP 07/23/2021   SpO2 97%   Breastfeeding Yes      77  Disposition: results routed to provider      First reading 138/94

## 2022-06-24 ENCOUNTER — OFFICE VISIT (OUTPATIENT)
Dept: PODIATRY | Facility: CLINIC | Age: 37
End: 2022-06-24
Payer: COMMERCIAL

## 2022-06-24 VITALS — SYSTOLIC BLOOD PRESSURE: 126 MMHG | BODY MASS INDEX: 49.44 KG/M2 | DIASTOLIC BLOOD PRESSURE: 82 MMHG | WEIGHT: 288 LBS

## 2022-06-24 DIAGNOSIS — M76.62 ACHILLES TENDINITIS OF LEFT LOWER EXTREMITY: Primary | ICD-10-CM

## 2022-06-24 PROCEDURE — 99203 OFFICE O/P NEW LOW 30 MIN: CPT | Performed by: PODIATRIST

## 2022-06-24 NOTE — PATIENT INSTRUCTIONS
Thank you for choosing [facility] Podiatry / Foot & Ankle Surgery!    DR VELIZ'S CLINIC:  [facility]   [address]   [place] [address]      TRIAGE LINE: [phone]  APPOINTMENTS: [phone]  RADIOLOGY: [phone]  SET UP SURGERY: [phone]  BILLING QUESTIONS: [phone]  FAX: [phone]         Follow up: 3 weeks      TENDONITIS   Tendons are the strong fibrous portions of muscles that attach to bones and allow the muscle to move a joint when it contracts. Tendons are very strong because they have a lot of force exerted on them. Sometimes tendons can become painful because they have suffered an acute injury, in which too much force was exerted at one time, or an overuse injury, in which a normal force was exerted too frequently or over a prolonged period of time. As a result, there is damage to the tendon and its surrounding soft tissue structures and they become inflammed. Because tendons do not have a great blood supply, they do not heal rapidly and the inflammation can become chronic.   Conservative treatment for tendinitis involves rest and anti-inflammatory measures. Ice is applied 15 minutes 2-3 times daily. Anti-inflammatory medications called NSAIDs (ibuprofen, example) can be taken provided they are used with caution, as they can lead to internal bleeding and increase the risk ofstroke and heart attack. Sometimes topical nitroglycerin is prescribed to help with pain. Often your doctor will use a special shoe or removable walking cast to immobilize the tendon, allowing it to heal without further damage from use. These devices are very useful in helping tendons heal, but they may slow you down or make you feel like your hip, knee, or back are out ofalignment. This is temporary and should go away once you are out ofthe immobilization. You should not use a walking cast when showering or driving. Another option is Platelet Rich Plasma injections.  (Normally done with a Sports and Orthorapedic doctor.   If conservative measures fail, your physician may need to surgically repair the tendon by removing any chronic inflammatory tissue and sewing it back together. Sometimes it is sewn to an adjacent tendon with similar function for support and sometimes it is lengthened. . Sometimes the bones around the tendon need to be realigned or reshaped to better support the tendon or prevent further damage. Your foot and ankle surgeon will discuss the specifics of your surgery with you, should you need it.    Towel stretch: Sit on a hard surface with your injured leg stretched out in front of you. Loop a towel around your toes and the ball of your foot and pull the towel toward your body keeping your leg straight. Hold this position for 15 to 30 seconds and then relax. Repeat 3 times. Then push the towel away with the ball of your foot. Repeat 3 times.  When you don't feel much of a stretch using the towel, you can start the standing calf stretch and the following exercises.  Standing calf stretch: Stand facing a wall with your hands on the wall at about eye level. Keep your injured leg back with your heel on the floor. Keep the other leg forward with the knee bent. Turn your back foot slightly inward (as if you were pigeon-toed). Slowly lean into the wall until you feel a stretch in the back of your calf. Hold the stretch for 15 to 30 seconds. Return to the starting position. Repeat 3 times. Do this exercise several times each day.   Standing soleus stretch: Stand facing a wall with your hands on the wall at about chest height. Keep your injured leg back with your heel on the floor. Keep the other leg forward with the knee bent. Turn your back foot slightly inward (as if you were pigeon-toed). Bend your back knee slightly and gently lean into the wall until you feel a stretch in the lower calf of your injured leg. Hold the stretch for 15 to 30 seconds. Return to the  starting position. Repeat 3 times.   Achilles stretch: Stand with the ball of one foot on a stair. Reach for the step below with your heel until you feel a stretch in the arch of your foot. Hold this position for 15 to 30 seconds and then relax. Repeat 3 times.   Heel raise: Balance yourself while standing behind a chair or counter. Using the chair or counter as a support to help you, raise your body up onto your toes and hold for 5 seconds. Then slowly lower yourself down without holding onto the support. (It's OK to keep holding onto the support if you need to.) When this exercise becomes less painful, try lowering yourself down on the injured leg only. Repeat 15 times. Do 2 sets of 15. Rest 30 seconds between sets.   Step-up: Stand with the foot of your injured leg on a support 3 to 5 inches high (like a small step or block of wood). Keep your other foot flat on the floor. Shift your weight onto the injured leg on the support. Straighten your injured leg as the other leg comes off the floor. Return to the starting position by bending your injured leg and slowly lowering your uninjured leg back to the floor. Do 2 sets of 15.   Resisted ankle eversion: Sit with both legs stretched out in front of you, with your feet about a shoulder's width apart. Tie a loop in one end of elastic tubing. Put the foot of your injured leg through the loop so that the tubing goes around the arch of that foot and wraps around the outside of the other foot. Hold onto the other end of the tubing with your hand to provide tension. Turn the foot of your injured leg up and out. Make sure you keep your other foot still so that it will allow the tubing to stretch as you move the foot of your injured leg. Return to the starting position. Do 2 sets of 15.   Balance and reach exercises: Stand next to a chair with your injured leg farther from the chair. The chair will provide support if you need it. Stand on the foot of your injured leg and bend  your knee slightly. Try to raise the arch of this foot while keeping your big toe on the floor. Keep your foot in this position. With the hand that is farther away from the chair, reach forward in front of you by bending at the waist. Avoid bending your knee any more as you do this. Repeat this 10 times. To make the exercise more challenging, reach farther in front of you. Do 2 sets of 10.  the same position as above. While keeping your arch height, reach the hand that is farther away from the chair across your body toward the chair. The farther you reach, the more challenging the exercise. Do 2 sets of 10.   Resisted ankle eversion: Sit with both legs stretched out in front of you, with your feet about a shoulder's width apart. Tie a loop in one end of elastic tubing. Put the foot of your injured leg through the loop so that the tubing goes around the arch of that foot and wraps around the outside of the other foot. Hold onto the other end of the tubing with your hand to provide tension. Turn the foot of your injured leg up and out. Make sure you keep your other foot still so that it will allow the tubing to stretch as you move the foot of your injured leg. Return to the starting position. Do 2 sets of 15.   If you have access to a wobble board, do the following exercises:  Wobble board exercises:   Stand on a wobble board with your feet shoulder width apart. Rock the board forwards and backwards 30 times, then side to side 30 times. Hold on to a chair if you need support.   Rotate the wobble board around so that the edge of the board is in contact with the floor at all times. Do this 30 times in a clockwise and then a counterclockwise direction.   Balance on the wobble board for as long as you can without letting the edges touch the floor. Try to do this for 2 minutes without touching the floor.   Rotate the wobble board in clockwise and counterclockwise circles, but do not let the edge of the board touch  the floor.   When you have mastered exercises A through D, try repeating them while standing on just your injured leg.   After you are able to do these exercises on one leg, try to do them with your eyes closed. Make sure you have something nearby to support you in case you lose your balance.   OVER THE COUNTER INSERTS  SuperFeet   Sofsole Fit Spenco   Power Step   Walk-Fit Arch Cradles     Most of these can be found at your local [employer], [employer], or online.  **A good high quality over the counter insert should cost around $40-$50 **    [employer] LOCATIONS  [place]  [address] [place] [address]  [phone]   [address]  [phone] [place] [address]  [phone]   [address]  [phone]   [place]  [address]  [phone] [place] [address]  [phone]   [place]  [address]  [phone]   [place]  [address]  [phone] [place]  [address]  [phone]

## 2022-06-24 NOTE — PROGRESS NOTES
"Foot & Ankle Surgery  June 24, 2022    CC: \"Pain, left heel\"    I was asked to see Cherie Yeboahan Valentina regarding the chief complaint by: Self    HPI:  Pt is a 37 year old female who presents with above complaint.  6-month history of aching in the left heel.  Pain 5 out of 10 \"every day during movement\".  This is worse with \"movement\".  \"Nothing\" for treatment.  Initially had post static dyskinesia but now has constant pain.  Patient works on feet doing hair.  Points to the back of the heel.    ROS:   Pos for CC.  The patient denies current nausea, vomiting, chills, fevers, belly pain, calf pain, chest pain or SOB.  Complete remainder of ROS is otherwise neg.    VITALS:  There were no vitals filed for this visit.    PMH:    Past Medical History:   Diagnosis Date     Anti-phospholipid antibody syndrome (H)      Atrial fibrillation (H)      Dysmenorrhea      Hypertension     with second full term pregnancy     Obese      Thyroid disease     hypothyroid       SXHX:    Past Surgical History:   Procedure Laterality Date     DILATION AND CURETTAGE N/A 4/18/2018    Procedure: SUCTION DILATION AND CURETTAGE;  Surgeon: Roxanna Moody MD;  Location: [facility];  Service:      DILATION AND CURETTAGE, OPERATIVE HYSTEROSCOPY WITH MORCELLATOR, COMBINED N/A 1/21/2019    Procedure: HYSTEROSCOPY, POLYPECTOMY, POSSIBLE DILATION AND CURETTAGE ([employer] MORCELLATOR AND FLUID MANAGEMENT);  Surgeon: Katia Rm MD;  Location: [facility]     GYN SURGERY      D & C     TOE SURGERY          MEDS:    Current Outpatient Medications   Medication     acetaminophen (TYLENOL) 325 MG tablet     ibuprofen (ADVIL/MOTRIN) 600 MG tablet     levothyroxine (SYNTHROID/LEVOTHROID) 125 MCG tablet     Prenatal Vit-Fe Fumarate-FA (PRENATAL PO)     No current facility-administered medications for this visit.       ALL:   No Known Allergies    FMH:    Family History   Problem Relation Age of Onset     Bipolar Disorder Other      Depression " Other      Alcoholism Other      Attention Deficit Disorder Other        SocHx:    Social History     Socioeconomic History     Marital status:      Spouse name: Not on file     Number of children: Not on file     Years of education: Not on file     Highest education level: Not on file   Occupational History     Not on file   Tobacco Use     Smoking status: Former Smoker     Quit date: 2018     Years since quittin.4     Smokeless tobacco: Never Used   Substance and Sexual Activity     Alcohol use: Not Currently     Comment: 2/month     Drug use: Never     Sexual activity: Yes     Partners: Male   Other Topics Concern     Parent/sibling w/ CABG, MI or angioplasty before 65F 55M? Not Asked   Social History Narrative     Not on file     Social Determinants of Health     Financial Resource Strain: Not on file   Food Insecurity: Not on file   Transportation Needs: Not on file   Physical Activity: Not on file   Stress: Not on file   Social Connections: Not on file   Intimate Partner Violence: Not on file   Housing Stability: Not on file           EXAMINATION:  Gen:   No apparent distress  Neuro:   A&Ox3, no deficits  Psych:    Answering questions appropriately for age and situation with normal affect  Head:    NCAT  Eye:    Visual scanning without deficit  Ear:    Response to auditory stimuli wnl  Lung:    Non-labored breathing on RA noted  Abd:    NTND per patient report  Lymph:    Neg for pitting/non-pitting edema BLE  Vasc:    Pulses palpable, CFT minimally delayed  Neuro:    Light touch sensation intact to all sensory nerve distributions without paresthesias  Derm:    Neg for nodules, lesions or ulcerations  MSK:    Left lower extremity -pain at the posterior calcaneus at the Achilles insertion.  No evidence of retrocalcaneal bursitis and the tenderness otherwise unremarkable for pain or thickness.  Ankle range of motion is approximately 10 degrees with the knee extended  Calf:    Neg for redness,  swelling or tenderness    Assessment:  37 year old female with left lower extremity insertional Achilles tendinitis      Plan:  Discussed etiologies, anatomy and options  1.  Left lower extremity insertional Achilles tendinitis  -I personally reviewed and interpreted the patient's lower extremity history pertinent to today's visit, including imaging/labs, in preparation for initiating a treatment program.  -Regarding the Achilles tendonitis, the Achilles handout was dispensed and discussed.  We talked about stretching, resting/activity modification, icing, Tylenol use as tolerated, inserts, supportive shoes and minimizing shoeless walking.    -discussed Achilles and hamstring stretches  -OTC insert information dispensed and discussed  -Consider immobilization (Tri-Lock brace versus cam walker) and PT if the above plan fails to provide sufficient relief    Follow up: 3 weeks or sooner with acute issues      Patient's medical history was reviewed today      Kalyan Cheung, GERALDINE FACFAS FACFAOM  Podiatric Foot & Ankle Surgeon  [facility]  [phone]    Disclaimer: This note consists of symbols derived from keyboarding, dictation and/or voice recognition software. As a result, there may be errors in the script that have gone undetected. Please consider this when interpreting information found in this chart.

## 2022-07-15 ENCOUNTER — HOSPITAL ENCOUNTER (EMERGENCY)
Facility: CLINIC | Age: 37
Discharge: HOME | End: 2022-07-15
Attending: EMERGENCY MEDICINE | Admitting: EMERGENCY MEDICINE
Payer: COMMERCIAL

## 2022-07-15 VITALS
BODY MASS INDEX: 46.1 KG/M2 | TEMPERATURE: 97.8 F | DIASTOLIC BLOOD PRESSURE: 55 MMHG | SYSTOLIC BLOOD PRESSURE: 107 MMHG | HEART RATE: 79 BPM | OXYGEN SATURATION: 98 % | WEIGHT: 270 LBS | HEIGHT: 64 IN | RESPIRATION RATE: 14 BRPM

## 2022-07-15 DIAGNOSIS — I48.0 PAROXYSMAL ATRIAL FIBRILLATION (H): ICD-10-CM

## 2022-07-15 LAB
ALBUMIN SERPL-MCNC: 3.7 G/DL (ref 3.5–5)
ALP SERPL-CCNC: 114 U/L (ref 45–120)
ALT SERPL W P-5'-P-CCNC: 57 U/L (ref 0–45)
ANION GAP SERPL CALCULATED.3IONS-SCNC: 9 MMOL/L (ref 5–18)
AST SERPL W P-5'-P-CCNC: 37 U/L (ref 0–40)
ATRIAL RATE - MUSE: 150 BPM
ATRIAL RATE - MUSE: 83 BPM
BASOPHILS # BLD AUTO: 0.1 10E3/UL (ref 0–0.2)
BASOPHILS NFR BLD AUTO: 1 %
BILIRUB SERPL-MCNC: 0.3 MG/DL (ref 0–1)
BUN SERPL-MCNC: 19 MG/DL (ref 8–22)
CALCIUM SERPL-MCNC: 9.4 MG/DL (ref 8.5–10.5)
CHLORIDE BLD-SCNC: 112 MMOL/L (ref 98–107)
CO2 SERPL-SCNC: 19 MMOL/L (ref 22–31)
CREAT SERPL-MCNC: 0.97 MG/DL (ref 0.6–1.1)
DIASTOLIC BLOOD PRESSURE - MUSE: 64 MMHG
DIASTOLIC BLOOD PRESSURE - MUSE: 86 MMHG
EOSINOPHIL # BLD AUTO: 0.3 10E3/UL (ref 0–0.7)
EOSINOPHIL NFR BLD AUTO: 3 %
ERYTHROCYTE [DISTWIDTH] IN BLOOD BY AUTOMATED COUNT: 12 % (ref 10–15)
GFR SERPL CREATININE-BSD FRML MDRD: 77 ML/MIN/1.73M2
GLUCOSE BLD-MCNC: 119 MG/DL (ref 70–125)
HCT VFR BLD AUTO: 40.9 % (ref 35–47)
HGB BLD-MCNC: 13.7 G/DL (ref 11.7–15.7)
IMM GRANULOCYTES # BLD: 0 10E3/UL
IMM GRANULOCYTES NFR BLD: 0 %
INTERPRETATION ECG - MUSE: NORMAL
INTERPRETATION ECG - MUSE: NORMAL
LYMPHOCYTES # BLD AUTO: 2.5 10E3/UL (ref 0.8–5.3)
LYMPHOCYTES NFR BLD AUTO: 29 %
MAGNESIUM SERPL-MCNC: 1.9 MG/DL (ref 1.8–2.6)
MCH RBC QN AUTO: 28.7 PG (ref 26.5–33)
MCHC RBC AUTO-ENTMCNC: 33.5 G/DL (ref 31.5–36.5)
MCV RBC AUTO: 86 FL (ref 78–100)
MONOCYTES # BLD AUTO: 0.9 10E3/UL (ref 0–1.3)
MONOCYTES NFR BLD AUTO: 10 %
NEUTROPHILS # BLD AUTO: 4.8 10E3/UL (ref 1.6–8.3)
NEUTROPHILS NFR BLD AUTO: 57 %
NRBC # BLD AUTO: 0 10E3/UL
NRBC BLD AUTO-RTO: 0 /100
P AXIS - MUSE: 55 DEGREES
P AXIS - MUSE: NORMAL DEGREES
PLATELET # BLD AUTO: 321 10E3/UL (ref 150–450)
POTASSIUM BLD-SCNC: 4.4 MMOL/L (ref 3.5–5)
PR INTERVAL - MUSE: 154 MS
PR INTERVAL - MUSE: NORMAL MS
PROT SERPL-MCNC: 7.9 G/DL (ref 6–8)
QRS DURATION - MUSE: 70 MS
QRS DURATION - MUSE: 82 MS
QT - MUSE: 292 MS
QT - MUSE: 348 MS
QTC - MUSE: 408 MS
QTC - MUSE: 461 MS
R AXIS - MUSE: 37 DEGREES
R AXIS - MUSE: 64 DEGREES
RBC # BLD AUTO: 4.77 10E6/UL (ref 3.8–5.2)
SODIUM SERPL-SCNC: 140 MMOL/L (ref 136–145)
SYSTOLIC BLOOD PRESSURE - MUSE: 114 MMHG
SYSTOLIC BLOOD PRESSURE - MUSE: 138 MMHG
T AXIS - MUSE: 31 DEGREES
T AXIS - MUSE: 43 DEGREES
TROPONIN I SERPL-MCNC: <0.01 NG/ML (ref 0–0.29)
TSH SERPL DL<=0.005 MIU/L-ACNC: 0.62 UIU/ML (ref 0.3–5)
VENTRICULAR RATE- MUSE: 150 BPM
VENTRICULAR RATE- MUSE: 83 BPM
WBC # BLD AUTO: 8.4 10E3/UL (ref 4–11)

## 2022-07-15 PROCEDURE — 250N000009 HC RX 250: Performed by: EMERGENCY MEDICINE

## 2022-07-15 PROCEDURE — 85025 COMPLETE CBC W/AUTO DIFF WBC: CPT | Performed by: EMERGENCY MEDICINE

## 2022-07-15 PROCEDURE — 96376 TX/PRO/DX INJ SAME DRUG ADON: CPT

## 2022-07-15 PROCEDURE — 36415 COLL VENOUS BLD VENIPUNCTURE: CPT | Performed by: EMERGENCY MEDICINE

## 2022-07-15 PROCEDURE — 250N000011 HC RX IP 250 OP 636: Performed by: EMERGENCY MEDICINE

## 2022-07-15 PROCEDURE — 250N000011 HC RX IP 250 OP 636

## 2022-07-15 PROCEDURE — 99285 EMERGENCY DEPT VISIT HI MDM: CPT | Mod: 25

## 2022-07-15 PROCEDURE — 99152 MOD SED SAME PHYS/QHP 5/>YRS: CPT

## 2022-07-15 PROCEDURE — 93005 ELECTROCARDIOGRAM TRACING: CPT | Performed by: EMERGENCY MEDICINE

## 2022-07-15 PROCEDURE — 84484 ASSAY OF TROPONIN QUANT: CPT | Performed by: EMERGENCY MEDICINE

## 2022-07-15 PROCEDURE — 82040 ASSAY OF SERUM ALBUMIN: CPT | Performed by: EMERGENCY MEDICINE

## 2022-07-15 PROCEDURE — 258N000003 HC RX IP 258 OP 636: Performed by: EMERGENCY MEDICINE

## 2022-07-15 PROCEDURE — 250N000013 HC RX MED GY IP 250 OP 250 PS 637: Performed by: EMERGENCY MEDICINE

## 2022-07-15 PROCEDURE — 80053 COMPREHEN METABOLIC PANEL: CPT | Performed by: EMERGENCY MEDICINE

## 2022-07-15 PROCEDURE — 92960 CARDIOVERSION ELECTRIC EXT: CPT

## 2022-07-15 PROCEDURE — 272N000240 HC CARDIOVERT/DEFIB/PACER SUPP

## 2022-07-15 PROCEDURE — 83735 ASSAY OF MAGNESIUM: CPT | Performed by: EMERGENCY MEDICINE

## 2022-07-15 PROCEDURE — 96374 THER/PROPH/DIAG INJ IV PUSH: CPT | Mod: 59

## 2022-07-15 PROCEDURE — 96361 HYDRATE IV INFUSION ADD-ON: CPT

## 2022-07-15 PROCEDURE — 84443 ASSAY THYROID STIM HORMONE: CPT | Performed by: EMERGENCY MEDICINE

## 2022-07-15 RX ORDER — FENTANYL CITRATE 50 UG/ML
50 INJECTION, SOLUTION INTRAMUSCULAR; INTRAVENOUS ONCE
Status: COMPLETED | OUTPATIENT
Start: 2022-07-15 | End: 2022-07-15

## 2022-07-15 RX ORDER — ONDANSETRON 4 MG/1
4 TABLET, ORALLY DISINTEGRATING ORAL EVERY 8 HOURS PRN
Qty: 10 TABLET | Refills: 0 | Status: SHIPPED | OUTPATIENT
Start: 2022-07-15 | End: 2022-07-18

## 2022-07-15 RX ORDER — SODIUM CHLORIDE 9 MG/ML
INJECTION, SOLUTION INTRAVENOUS CONTINUOUS
Status: DISCONTINUED | OUTPATIENT
Start: 2022-07-15 | End: 2022-07-15 | Stop reason: HOSPADM

## 2022-07-15 RX ORDER — ONDANSETRON 2 MG/ML
INJECTION INTRAMUSCULAR; INTRAVENOUS
Status: COMPLETED
Start: 2022-07-15 | End: 2022-07-15

## 2022-07-15 RX ORDER — METOPROLOL TARTRATE 1 MG/ML
5 INJECTION, SOLUTION INTRAVENOUS
Status: COMPLETED | OUTPATIENT
Start: 2022-07-15 | End: 2022-07-15

## 2022-07-15 RX ORDER — ASPIRIN 325 MG
325 TABLET ORAL ONCE
Status: COMPLETED | OUTPATIENT
Start: 2022-07-15 | End: 2022-07-15

## 2022-07-15 RX ORDER — ETOMIDATE 2 MG/ML
0.15 INJECTION INTRAVENOUS ONCE
Status: COMPLETED | OUTPATIENT
Start: 2022-07-15 | End: 2022-07-15

## 2022-07-15 RX ADMIN — METOPROLOL TARTRATE 5 MG: 1 INJECTION, SOLUTION INTRAVENOUS at 02:29

## 2022-07-15 RX ADMIN — ETOMIDATE 18.4 MG: 20 INJECTION, SOLUTION INTRAVENOUS at 03:59

## 2022-07-15 RX ADMIN — METOPROLOL TARTRATE 5 MG: 1 INJECTION, SOLUTION INTRAVENOUS at 02:43

## 2022-07-15 RX ADMIN — FENTANYL CITRATE 50 MCG: 50 INJECTION, SOLUTION INTRAMUSCULAR; INTRAVENOUS at 03:57

## 2022-07-15 RX ADMIN — SODIUM CHLORIDE 1000 ML: 9 INJECTION, SOLUTION INTRAVENOUS at 02:20

## 2022-07-15 RX ADMIN — ASPIRIN 325 MG ORAL TABLET 325 MG: 325 PILL ORAL at 04:23

## 2022-07-15 RX ADMIN — METOPROLOL TARTRATE 5 MG: 1 INJECTION, SOLUTION INTRAVENOUS at 03:03

## 2022-07-15 RX ADMIN — ONDANSETRON 4 MG: 2 INJECTION INTRAMUSCULAR; INTRAVENOUS at 04:04

## 2022-07-15 ASSESSMENT — ENCOUNTER SYMPTOMS
SHORTNESS OF BREATH: 0
DYSURIA: 0
HEMATURIA: 0
NAUSEA: 0
VOMITING: 0
BLOOD IN STOOL: 0
PALPITATIONS: 1

## 2022-07-15 NOTE — SEDATION DOCUMENTATION
postt cardioversion and pt is sleeping and snoring.  She did drool for about a minute and airway was adjusted slightly to help her stop snoring.  She is now waking up and asking the usual questions of where she is and when are we going to start procedure.

## 2022-07-15 NOTE — ED PROVIDER NOTES
EMERGENCY DEPARTMENT ENCOUNTER      NAME: Cherie Montgomery  AGE: 37 year old female  YOB: 1985  MRN: [id number]  EVALUATION DATE & TIME: 7/15/2022  1:45 AM    PCP: Katia Rm    ED PROVIDER: Oswald Polk M.D.      Chief Complaint   Patient presents with     Atrial Fib     papitations         FINAL IMPRESSION:  1. Paroxysmal atrial fibrillation (H)          ED COURSE & MEDICAL DECISION MAKING:    Pertinent Labs & Imaging studies reviewed. (See chart for details)  37 year old female presents to the Emergency Department for evaluation of palpitations.  Found to be and A. fib on arrival.  Initially tried metoprolol however heart rate did not come down.  Thyroid levels normal.  Electrolytes are otherwise unremarkable.  Troponin is negative.  CBC is normal.  After discussion with the patient did decide to do cardioversion.  Discussed risks and benefits.  Patient tolerated cardioversion.  We will put her on an aspirin and have her follow-up with rapid access clinic.  She is low risk by [id number] score.   Patient will return for any worsening symptoms.    1:49 AM I met with the patient to gather history and to perform my initial exam. I discussed the plan for care while in the Emergency Department. PPE: Facemask, goggles and gloves   3:58 AM Cardioverted patient.     At the conclusion of the encounter I discussed the results of all of the tests and the disposition. The questions were answered. The patient or family acknowledged understanding and was agreeable with the care plan.         Critical Care     Performed by: Dr Oswald Polk  Authorized by: Dr Oswald Polk  Total critical care time: 90 minutes  Critical care was necessary to treat or prevent imminent or life-threatening deterioration of the following conditions: atrial fib  Critical care was time spent personally by me on the following activities: development of treatment plan with patient or surrogate, discussions with  consultants, examination of patient, evaluation of patient's response to treatment, obtaining history from patient or surrogate, ordering and performing treatments and interventions, ordering and review of laboratory studies, ordering and review of radiographic studies, re-evaluation of patient's condition and monitoring for potential decompensation.  Critical care time was exclusive of separately billable procedures and treating other patients.      MEDICATIONS GIVEN IN THE EMERGENCY:  Medications   0.9% sodium chloride BOLUS (0 mLs Intravenous Stopped 7/15/22 0527)     Followed by   sodium chloride 0.9% infusion (has no administration in time range)   metoprolol (LOPRESSOR) injection 5 mg (5 mg Intravenous Given 7/15/22 0303)   etomidate (AMIDATE) injection 18.4 mg (18.4 mg Intravenous Given 7/15/22 0359)   fentaNYL (PF) (SUBLIMAZE) injection 50 mcg (50 mcg Intravenous Given 7/15/22 0357)   ondansetron (ZOFRAN) 2 MG/ML injection (4 mg  Given 7/15/22 0404)   aspirin (ASA) tablet 325 mg (325 mg Oral Given 7/15/22 0423)       NEW PRESCRIPTIONS STARTED AT TODAY'S ER VISIT  New Prescriptions    ONDANSETRON (ZOFRAN ODT) 4 MG ODT TAB    Take 1 tablet (4 mg) by mouth every 8 hours as needed for nausea          =================================================================    HPI    Patient information was obtained from: Patient    Use of : N/A         Cherie Montgomery is a 37 year old female with a pertinent history of paroxysmal atrial fibrillation, hypothyroidism and antiphospholipid syndrome who presents to this ED by EMS for evaluation of atrial fibrillation.    Patient reports that she woke up from sleep at 1230 with her heart racing that feels just like when she was in atrial fibrillation about 1-2 years ago. She is not on blood thinners. Patient is ten weeks post partum and is breast feeding. She takes thyroid medication. No recent medication changes. Patient has a history of blood clotting  disorder. She denies any nausea, vomiting, chest pain, shortness of breath, urinary or stool changes, or any other complaints at this time.       REVIEW OF SYSTEMS   Review of Systems   Respiratory: Negative for shortness of breath.    Cardiovascular: Positive for palpitations. Negative for chest pain.   Gastrointestinal: Negative for blood in stool, nausea and vomiting.   Genitourinary: Negative for dysuria and hematuria.   All other systems reviewed and are negative.       PAST MEDICAL HISTORY:  Past Medical History:   Diagnosis Date     Anti-phospholipid antibody syndrome (H)      Atrial fibrillation (H)      Dysmenorrhea      Hypertension     with second full term pregnancy     Obese      Thyroid disease     hypothyroid       PAST SURGICAL HISTORY:  Past Surgical History:   Procedure Laterality Date     DILATION AND CURETTAGE N/A 4/18/2018    Procedure: SUCTION DILATION AND CURETTAGE;  Surgeon: Roxanna Moody MD;  Location: [facility];  Service:      DILATION AND CURETTAGE, OPERATIVE HYSTEROSCOPY WITH MORCELLATOR, COMBINED N/A 1/21/2019    Procedure: HYSTEROSCOPY, POLYPECTOMY, POSSIBLE DILATION AND CURETTAGE (GUY NEPHPoikos MORCELLATOR AND FLUID MANAGEMENT);  Surgeon: Katia Rm MD;  Location: [facility]     GYN SURGERY      D & C     TOE SURGERY             CURRENT MEDICATIONS:    Current Facility-Administered Medications   Medication     sodium chloride 0.9% infusion     Current Outpatient Medications   Medication     acetaminophen (TYLENOL) 325 MG tablet     ibuprofen (ADVIL/MOTRIN) 600 MG tablet     levothyroxine (SYNTHROID/LEVOTHROID) 125 MCG tablet     ondansetron (ZOFRAN ODT) 4 MG ODT tab     Prenatal Vit-Fe Fumarate-FA (PRENATAL PO)         ALLERGIES:  No Known Allergies    FAMILY HISTORY:  Family History   Problem Relation Age of Onset     Bipolar Disorder Other      Depression Other      Alcoholism Other      Attention Deficit Disorder Other        SOCIAL HISTORY:   Social History  "    Socioeconomic History     Marital status:    Tobacco Use     Smoking status: Former Smoker     Quit date: 2018     Years since quittin.4     Smokeless tobacco: Never Used   Substance and Sexual Activity     Alcohol use: Not Currently     Comment: 2/month     Drug use: Never     Sexual activity: Yes     Partners: Male       VITALS:  /80   Pulse 77   Temp 97.8  F (36.6  C) (Oral)   Resp 10   Ht 1.626 m (5' 4\")   Wt 122.5 kg (270 lb)   SpO2 97%   Breastfeeding Yes   BMI 46.35 kg/m      PHYSICAL EXAM    Physical Exam  Constitutional:       General: She is not in acute distress.     Appearance: She is not diaphoretic.   HENT:      Head: Atraumatic.      Mouth/Throat:      Pharynx: No oropharyngeal exudate.   Eyes:      General: No scleral icterus.     Pupils: Pupils are equal, round, and reactive to light.   Cardiovascular:      Rate and Rhythm: Tachycardia present. Rhythm irregular.      Heart sounds: Normal heart sounds.   Pulmonary:      Effort: No respiratory distress.      Breath sounds: Normal breath sounds.   Abdominal:      Palpations: Abdomen is soft.      Tenderness: There is no abdominal tenderness. There is no guarding or rebound.   Musculoskeletal:         General: No tenderness.   Skin:     General: Skin is warm.      Findings: No rash.   Neurological:      General: No focal deficit present.      Mental Status: She is alert.           LAB:  All pertinent labs reviewed and interpreted.  Labs Ordered and Resulted from Time of ED Arrival to Time of ED Departure   COMPREHENSIVE METABOLIC PANEL - Abnormal       Result Value    Sodium 140      Potassium 4.4      Chloride 112 (*)     Carbon Dioxide (CO2) 19 (*)     Anion Gap 9      Urea Nitrogen 19      Creatinine 0.97      Calcium 9.4      Glucose 119      Alkaline Phosphatase 114      AST 37      ALT 57 (*)     Protein Total 7.9      Albumin 3.7      Bilirubin Total 0.3      GFR Estimate 77     MAGNESIUM - Normal    Magnesium " 1.9     TSH WITH FREE T4 REFLEX - Normal    TSH 0.62     TROPONIN I - Normal    Troponin I <0.01     CBC WITH PLATELETS AND DIFFERENTIAL    WBC Count 8.4      RBC Count 4.77      Hemoglobin 13.7      Hematocrit 40.9      MCV 86      MCH 28.7      MCHC 33.5      RDW 12.0      Platelet Count 321      % Neutrophils 57      % Lymphocytes 29      % Monocytes 10      % Eosinophils 3      % Basophils 1      % Immature Granulocytes 0      NRBCs per 100 WBC 0      Absolute Neutrophils 4.8      Absolute Lymphocytes 2.5      Absolute Monocytes 0.9      Absolute Eosinophils 0.3      Absolute Basophils 0.1      Absolute Immature Granulocytes 0.0      Absolute NRBCs 0.0         RADIOLOGY:  Reviewed all pertinent imaging. Please see official radiology report.  No orders to display       EKG:    Performed at: 149  Impression: Atrial fibrillation with RVR.  No other abnormalities.  No previous  A. fib with a ventricular rate of 150.  QRS 70.  QTc 461    Performed at: 401  Impression: Normal sinus rhythm.  No acute abnormalities.  When compared to previous earlier night no longer in A. fib normal sinus rhythm ventricular 83.  .  QRS 82.  QTc 408    I have independently reviewed and interpreted the EKG(s) documented above.    PROCEDURES:     PROCEDURE: Electrical Cardioversion with Procedural Sedation   INDICATIONS: Sedation is required to allow for Cardioversion for Atrial Fibrilation    CARDIOVERSION TYPE: Biphasic, External   SEDATION PROVIDER: Dr Oswald Polk   CARDIOVERSION PROVIDER: Dr Oswald Polk   LEVEL OF SEDATION: Moderate Sedation    Defined as:  Minimal = Normal response to verbal  Moderate = Responds to verbal and light tactile stimulation  Deep = Responds after repeated painful stimulation   CONSENT: Risks, benefits and alternatives were discussed with and Written consent was obtained from Patient.   PROCEDURE SPECIFIC CHECKLIST COMPLETED: Yes   LAST ORAL INTAKE: Light Meal > 6 hours and Regular Meal > 8 hours    ASA CLASS: 1 - Healthy patient, no medical problems   MALLAMPATI:  I - Faucial pillars, soft palate, and uvula are visible   TIME OUT: Universal protocol was followed. TIME OUT conducted just prior to starting procedure confirmed patient identity, site/side, procedure, patient position, and availability of correct equipment. Yes    Immediately prior to initiation of sedation, reassessment of clinical condition was performed which was unchanged.   MEDICATIONS GIVEN: Etomidate, 18 mg, IV and Fentanyl, 50 mcg, IV    MONITORING: heart rate, cardiac monitor, continuous pulse oximeter, continuous capnometry (end tidal CO2), frequent blood pressure checks, level of consciousness checks, IV access, constant attendance by RN until patient is recovered and constant attendance by MD until patient is stable   RESPONSE: vital signs stable, airway patent and O2 saturations remained >92%   POST-SEDATION ASSESSMENT/PROCEDURE NOTE: CARDIOVERSION: Quick combo electrodes were placed anterior-lateral  Trial 1: Synchronized shock at 150 joules was Successful    SEDATION:  Lowest level oxygen saturation reached was 91%.    Post procedure patient was alert and responds to verbal stimuli    Patient was monitored during recovery and returned to pre-procedure baseline.   TOTAL MD DRUG ADMINISTRATION / MONITORING TIME: 15 minutes.   COMPLICATIONS: Patient tolerated procedure well, without complication          I, Rudy Viveros, am serving as a scribe to document services personally performed by Dr. Oswald Polk, based on my observation and the provider's statements to me. I, Oswald Polk MD attest that Rudy Viveros is acting in a scribe capacity, has observed my performance of the services and has documented them in accordance with my direction.    Oswald Polk M.D.  Emergency Medicine  [facility] EMERGENCY ROOM  [address] [facility] [address] [facility] [address] [facility] [id number] [phone]  Dept:  [phone]     Oswald Polk MD  07/15/22 0602

## 2022-07-15 NOTE — SEDATION DOCUMENTATION
Pt states she is still experiencing nausea, was able to void and walk to the bathroom standby assist.

## 2022-07-18 ENCOUNTER — TELEPHONE (OUTPATIENT)
Dept: CARDIOLOGY | Facility: CLINIC | Age: 37
End: 2022-07-18

## 2022-07-18 NOTE — TELEPHONE ENCOUNTER
Noted, phone call back to pt to review. She states understanding and will await appt on Thursday.  She will also call her provider who manages her levothyroxine to discuss tapering medication.    No further questions or concerns at this time.    Luanne

## 2022-07-18 NOTE — TELEPHONE ENCOUNTER
[facility] Call Center    Phone Message    May a detailed message be left on voicemail: yes     Reason for Call: Other: Patient wants to know if there is a care plan in place until patient is able to see provider on 7/21.  Patient had a bout of afib and went to ED. She is currently breast feeding. Are there any medications she should be on until she sees a provider on 7/21. Please call patient to discuss. Patient is concerrned about a reoccurrence prior to seeing provider.     Action Taken: Other: routed to cardiology    Travel Screening: Not Applicable

## 2022-07-18 NOTE — TELEPHONE ENCOUNTER
Spoke to pt regarding below, she states since her CV she thinks her heart has stayed in SR, Hrs have been between 68-80bpm per her fitbit watch.    When she was in AFIB her symptoms were palpitations, Hrs were as high as 190bpm, felt off.  Otherwise denied nausea, fatigue, pain.    Pt has a  (10 week old) and kid under 2 years old.  She ended up having CV at the ER but has been very nauseated since, has had a headache since CV, and very fatigued.  Discussed with pt this is unusual after CV.      Pt had lovenox during pregnancies due to clotting disorder but is not on AC.  She was told to start Aspirin in the ER, but she has not started this yet. She agreed to start baby ASA 81mg daily starting today.    Pt is wondering if there is anything she can start to help prevent further episode of AFIB while she waits to see Cardiologist on Thursday?    Pt stopped her levothyroxine cold turkey 125mcg daily and thinks possibly this could be the cause of her fatigue? Her OB had said she could taper or stop completely, she opted to stop and her last dose was the day before she went to the ER.  Discussed with pt this may be the cause of her fatigue and nausea but I was unsure.    Trevor- Pt states since she saw you she has had episodes often where she feels palpitations, skips and jumps, in her heart beat, leading up to this AFIB episode.  She is wondering if we should order further tests to see what her heart is doing?    Discussed that I will forward over to  to review and see if she would like to start medication or order further testing prior to RAC appt on Thursday with Dr. Sainz.    Luanne

## 2022-07-18 NOTE — TELEPHONE ENCOUNTER
Radha Parham APRN CNP  You 11 minutes ago (11:18 AM)     JH    No testing with upcoming rac appointment on Thursday.  I think she should not go from levothyroxine 125 mcg orally every day to nothing.  This is a drastic change for her body.  She may want to cut her dose in half for several weeks before stopping this medication.  She should call the physician who ordered for directions on tapering.   Trevor.    Message text

## 2022-07-19 ENCOUNTER — PATIENT OUTREACH (OUTPATIENT)
Dept: CARE COORDINATION | Facility: CLINIC | Age: 37
End: 2022-07-19

## 2022-07-19 DIAGNOSIS — I48.0 PAROXYSMAL ATRIAL FIBRILLATION (H): Primary | ICD-10-CM

## 2022-07-19 NOTE — PROGRESS NOTES
Clinic Care Coordination Contact    CC notes are in [employer] the EMR for OG clinic.     CRISTY Malone   Ambulatory Care    [facility] - Care Coordination   7/19/2022 1:37 PM  [phone]

## 2022-07-20 ENCOUNTER — TELEPHONE (OUTPATIENT)
Dept: CARDIOLOGY | Facility: CLINIC | Age: 37
End: 2022-07-20

## 2022-07-20 NOTE — TELEPHONE ENCOUNTER
[facility] Call Center    Phone Message    May a detailed message be left on voicemail: no     Reason for Call: Other: Cherie called because she has a dental appointment today 7/20/2022 for a routine cleaning and was just notified she should check with cardiology prior to this visit. Please reach out to Cherie with clarification.      Action Taken: Other: [facility]    Travel Screening: Not Applicable

## 2022-07-21 ENCOUNTER — OFFICE VISIT (OUTPATIENT)
Dept: CARDIOLOGY | Facility: CLINIC | Age: 37
End: 2022-07-21
Attending: EMERGENCY MEDICINE
Payer: COMMERCIAL

## 2022-07-21 VITALS
BODY MASS INDEX: 46.55 KG/M2 | WEIGHT: 271.2 LBS | RESPIRATION RATE: 16 BRPM | DIASTOLIC BLOOD PRESSURE: 81 MMHG | SYSTOLIC BLOOD PRESSURE: 114 MMHG | HEART RATE: 86 BPM

## 2022-07-21 DIAGNOSIS — I48.0 PAROXYSMAL ATRIAL FIBRILLATION (H): Primary | ICD-10-CM

## 2022-07-21 DIAGNOSIS — E66.813 CLASS 3 SEVERE OBESITY DUE TO EXCESS CALORIES WITH SERIOUS COMORBIDITY AND BODY MASS INDEX (BMI) OF 45.0 TO 49.9 IN ADULT (H): ICD-10-CM

## 2022-07-21 DIAGNOSIS — E03.9 HYPOTHYROIDISM, UNSPECIFIED TYPE: ICD-10-CM

## 2022-07-21 DIAGNOSIS — E66.01 CLASS 3 SEVERE OBESITY DUE TO EXCESS CALORIES WITH SERIOUS COMORBIDITY AND BODY MASS INDEX (BMI) OF 45.0 TO 49.9 IN ADULT (H): ICD-10-CM

## 2022-07-21 DIAGNOSIS — R00.2 PALPITATIONS: ICD-10-CM

## 2022-07-21 PROCEDURE — 99204 OFFICE O/P NEW MOD 45 MIN: CPT | Performed by: INTERNAL MEDICINE

## 2022-07-21 RX ORDER — LEVOTHYROXINE SODIUM 50 UG/1
50 TABLET ORAL DAILY
COMMUNITY
Start: 2022-07-18

## 2022-07-21 NOTE — PATIENT INSTRUCTIONS
It was a pleasure to meet with you today.      Below is a summary of your visit.   Discuss your thyroid replacement with your endocrinologist.  Work on weight loss through diet and exercise  Wear a heart rhythm monitor for 10 days to look for abnormal heart rhythm.  Follow up with me in about a month.    We will call you to inform you of your test or procedure results within 3 business days of the test being performed.  If you do not hear from our office with the test results within 1 week please do not hesitate to call asking for these results.     Please do not hesitate to call the [facility] clinic with any questions or concerns at [phone].     Sincerely,

## 2022-07-21 NOTE — LETTER
7/21/2022    Katia Rm MD  [facility] [address]  [facility] [address]    RE: Cherie Montgomery       Dear Colleague,     I had the pleasure of seeing Cherie Montgomery in the [facility].    [facility]   [address], [place] [address]   [email or web address]   OFFICE: [phone]     CARDIOLOGY CLINIC NOTE     Thank you, Dr. Rm, Katia Chávez, for asking the [facility] team to see Ms. Cherie Montgomery to evaluate Atrial Fib        Assessment/Recommendations   Assessment:    1. Paroxysmal atrial fibrillation - with recent recurrence requiring cardioversion.  2. Palpitations - likely not all related to afib. This is causing significant anxiety for Cherie. Will evaluate with event monitor.  3. Hypothyroidism - with recent TSH of 0.62 - although within normal range, the relatively high circulating thyroid hormone may contribute to afib recurrence. Advised Cherie discuss her levothyroxine dosing with her prescribing physician.  4. Obesity due to excess calories with comorbidity (afib) and BMI >40 - discussed that weight loss of 10% or greater can significantly reduce burden of afib.  5. Antiphospholipid antibody syndrome    Plan:  1. Agree with reducing TSH. Will defer additional management to endocrinology  2. Continue daily aspirin  3. 10 day event monitor  4. Recommend sleep medicine eval for SONALI  5. Discussed weight loss.  6. Follow up in about a month    A total of 45 minutes were spent on today's encounter.         History of Present Illness   Ms. Cherie Montgomery is a 37 year old female with a significant past history of hypothyroidism, antiphospholipid antibody syndrome, obesity and paroxysmal atrial fibrillation who presents for follow-up from a recent ER visit.     has known paroxysmal atrial fibrillation which reoccurred on 7/15/22 resulting in an ER visit. Rate control was  initially attempted with no improvement in heart rate (up to 190 bpm). She ultimately underwent DCCV, which she tolerated well.    Other than noted above, Ms. Montgomery denies any chest pain/pressure/tightness, shortness of breath at rest or with exertion, light headedness/dizziness, pre-syncope, syncope, lower extremity swelling, palpitations, paroxysmal nocturnal dyspnea (PND), or orthopnea.     Cardiac Problems and Cardiac Diagnostics     Most Recent Cardiac testing:  ECG dated 7/15/22 (personaly reviewed and interpreted): 2 ECGs reviewed. initially afib with RVR,  bpm, followed by normal sinus rhythm.    ECHO (report reviewed):   TTE 6/5/21   Final Conclusion    Visually Estimated EF: 60-65%    Normal LV size and systolic function.    Normal RV size and systolic function.    No significant valvular heart disease noted.           Medications  Allergies   Current Outpatient Medications   Medication Sig Dispense Refill     levothyroxine (SYNTHROID/LEVOTHROID) 125 MCG tablet TAKE 1 TABLET BY MOUTH DAILY AS DIRECTED       levothyroxine (SYNTHROID/LEVOTHROID) 50 MCG tablet Take 50 mcg by mouth daily       Prenatal Vit-Fe Fumarate-FA (PRENATAL PO) Take 1 tablet by mouth daily       acetaminophen (TYLENOL) 325 MG tablet Take 2 tablets (650 mg) by mouth every 4 hours as needed for mild pain or fever (Patient not taking: Reported on 7/21/2022) 50 tablet 0     ibuprofen (ADVIL/MOTRIN) 600 MG tablet Take 1 tablet (600 mg) by mouth every 6 hours as needed (Patient not taking: Reported on 7/21/2022) 60 tablet 1      No Known Allergies     Physical Examination Review of Systems   Vitals: /81 (BP Location: Right arm, Patient Position: Sitting, Cuff Size: Adult Large)   Pulse 86   Resp 16   Wt 123 kg (271 lb 3.2 oz)   BMI 46.55 kg/m    BMI= Body mass index is 46.55 kg/m .  Wt Readings from Last 3 Encounters:   07/21/22 123 kg (271 lb 3.2 oz)   07/15/22 122.5 kg (270 lb)   06/24/22 130.6 kg (288 lb)       General  Appearance:   Pleasant female, appears stated age. no acute distress, obese body habitus   ENT/Mouth: membranes moist, no apparent gingival bleeding.      EYES:  no scleral icterus, normal conjunctivae   Neck: no carotid bruits. supple   Respiratory:   lungs are clear to auscultation, no rales or wheezing, equal chest wall expansion    Cardiovascular:   Regular rhythm, normal rate. Normal first and second heart sounds with no murmurs, rubs, or gallops; Jugular venous pressure normal, no edema bilaterally    Extremities: no cyanosis or clubbing   Skin: no xanthelasma, warm.    Heme/lymph/ Immunology No apparent bleeding noted.   Neurologic: Alert and oriented. normal gait, no tremors   Psychiatric: Pleasant, calm, appropriate affect.         Please refer above for cardiac ROS details.       Past History   Past Medical History:   Past Medical History:   Diagnosis Date     Anti-phospholipid antibody syndrome (H)      Atrial fibrillation (H)      Dysmenorrhea      Hypertension     with second full term pregnancy     Obese      Thyroid disease     hypothyroid        Past Surgical History:   Past Surgical History:   Procedure Laterality Date     DILATION AND CURETTAGE N/A 4/18/2018    Procedure: SUCTION DILATION AND CURETTAGE;  Surgeon: Roxanna Moody MD;  Location: [facility];  Service:      DILATION AND CURETTAGE, OPERATIVE HYSTEROSCOPY WITH MORCELLATOR, COMBINED N/A 1/21/2019    Procedure: HYSTEROSCOPY, POLYPECTOMY, POSSIBLE DILATION AND CURETTAGE (AbilTo MORCELLATOR AND FLUID MANAGEMENT);  Surgeon: Katia Rm MD;  Location:  OR     GYN SURGERY      D & C     TOE SURGERY          Family History:   Family History   Problem Relation Age of Onset     Bipolar Disorder Other      Depression Other      Alcoholism Other      Attention Deficit Disorder Other         Social History:   Social History     Socioeconomic History     Marital status:      Spouse name: Not on file     Number of  children: Not on file     Years of education: Not on file     Highest education level: Not on file   Occupational History     Not on file   Tobacco Use     Smoking status: Former Smoker     Quit date: 2018     Years since quittin.4     Smokeless tobacco: Never Used   Substance and Sexual Activity     Alcohol use: Not Currently     Comment: 2/month     Drug use: Never     Sexual activity: Yes     Partners: Male   Other Topics Concern     Parent/sibling w/ CABG, MI or angioplasty before 65F 55M? Not Asked   Social History Narrative     Not on file     Social Determinants of Health     Financial Resource Strain: Not on file   Food Insecurity: Not on file   Transportation Needs: Not on file   Physical Activity: Not on file   Stress: Not on file   Social Connections: Not on file   Intimate Partner Violence: Not on file   Housing Stability: Not on file            Lab Results    Chemistry/lipid CBC Cardiac Enzymes/BNP/TSH/INR   Lab Results   Component Value Date    BUN 19 07/15/2022     07/15/2022    CO2 19 (L) 07/15/2022    Lab Results   Component Value Date    WBC 8.4 07/15/2022    HGB 13.7 07/15/2022    HCT 40.9 07/15/2022    MCV 86 07/15/2022     07/15/2022    Lab Results   Component Value Date    TROPONINI <0.01 07/15/2022    TSH 0.62 07/15/2022    INR 0.99 2020                Thank you for allowing me to participate in the care of your patient.      Sincerely,     Blaine Sainz MD     [facility]  cc:   Oswald Polk MD  [address] [place] [address]

## 2022-07-21 NOTE — PROGRESS NOTES
[facility] HEART CARE   [address], [place] [address]   [email or web address]   OFFICE: [phone]     CARDIOLOGY CLINIC NOTE     Thank you, Dr. Rm, Katia Chávez, for asking the [facility] Heart Care team to see Ms. Cherie Montgomery to evaluate Atrial Fib        Assessment/Recommendations   Assessment:    1. Paroxysmal atrial fibrillation - with recent recurrence requiring cardioversion.  2. Palpitations - likely not all related to afib. This is causing significant anxiety for Cherie. Will evaluate with event monitor.  3. Hypothyroidism - with recent TSH of 0.62 - although within normal range, the relatively high circulating thyroid hormone may contribute to afib recurrence. Advised Cherie discuss her levothyroxine dosing with her prescribing physician.  4. Obesity due to excess calories with comorbidity (afib) and BMI >40 - discussed that weight loss of 10% or greater can significantly reduce burden of afib.  5. Antiphospholipid antibody syndrome    Plan:  1. Agree with reducing TSH. Will defer additional management to endocrinology  2. Continue daily aspirin  3. 10 day event monitor  4. Recommend sleep medicine eval for SONALI  5. Discussed weight loss.  6. Follow up in about a month    A total of 45 minutes were spent on today's encounter.         History of Present Illness   Ms. Cherie Montgomery is a 37 year old female with a significant past history of hypothyroidism, antiphospholipid antibody syndrome, obesity and paroxysmal atrial fibrillation who presents for follow-up from a recent ER visit.     has known paroxysmal atrial fibrillation which reoccurred on 7/15/22 resulting in an ER visit. Rate control was initially attempted with no improvement in heart rate (up to 190 bpm). She ultimately underwent DCCV, which she tolerated well.    Other than noted above, Ms. Montgomery denies any chest pain/pressure/tightness, shortness of breath at rest or with  exertion, light headedness/dizziness, pre-syncope, syncope, lower extremity swelling, palpitations, paroxysmal nocturnal dyspnea (PND), or orthopnea.     Cardiac Problems and Cardiac Diagnostics     Most Recent Cardiac testing:  ECG dated 7/15/22 (personaly reviewed and interpreted): 2 ECGs reviewed. initially afib with RVR,  bpm, followed by normal sinus rhythm.    ECHO (report reviewed):   TTE 6/5/21   Final Conclusion    Visually Estimated EF: 60-65%    Normal LV size and systolic function.    Normal RV size and systolic function.    No significant valvular heart disease noted.           Medications  Allergies   Current Outpatient Medications   Medication Sig Dispense Refill     levothyroxine (SYNTHROID/LEVOTHROID) 125 MCG tablet TAKE 1 TABLET BY MOUTH DAILY AS DIRECTED       levothyroxine (SYNTHROID/LEVOTHROID) 50 MCG tablet Take 50 mcg by mouth daily       Prenatal Vit-Fe Fumarate-FA (PRENATAL PO) Take 1 tablet by mouth daily       acetaminophen (TYLENOL) 325 MG tablet Take 2 tablets (650 mg) by mouth every 4 hours as needed for mild pain or fever (Patient not taking: Reported on 7/21/2022) 50 tablet 0     ibuprofen (ADVIL/MOTRIN) 600 MG tablet Take 1 tablet (600 mg) by mouth every 6 hours as needed (Patient not taking: Reported on 7/21/2022) 60 tablet 1      No Known Allergies     Physical Examination Review of Systems   Vitals: /81 (BP Location: Right arm, Patient Position: Sitting, Cuff Size: Adult Large)   Pulse 86   Resp 16   Wt 123 kg (271 lb 3.2 oz)   BMI 46.55 kg/m    BMI= Body mass index is 46.55 kg/m .  Wt Readings from Last 3 Encounters:   07/21/22 123 kg (271 lb 3.2 oz)   07/15/22 122.5 kg (270 lb)   06/24/22 130.6 kg (288 lb)       General Appearance:   Pleasant female, appears stated age. no acute distress, obese body habitus   ENT/Mouth: membranes moist, no apparent gingival bleeding.      EYES:  no scleral icterus, normal conjunctivae   Neck: no carotid bruits. supple    Respiratory:   lungs are clear to auscultation, no rales or wheezing, equal chest wall expansion    Cardiovascular:   Regular rhythm, normal rate. Normal first and second heart sounds with no murmurs, rubs, or gallops; Jugular venous pressure normal, no edema bilaterally    Extremities: no cyanosis or clubbing   Skin: no xanthelasma, warm.    Heme/lymph/ Immunology No apparent bleeding noted.   Neurologic: Alert and oriented. normal gait, no tremors   Psychiatric: Pleasant, calm, appropriate affect.         Please refer above for cardiac ROS details.       Past History   Past Medical History:   Past Medical History:   Diagnosis Date     Anti-phospholipid antibody syndrome (H)      Atrial fibrillation (H)      Dysmenorrhea      Hypertension     with second full term pregnancy     Obese      Thyroid disease     hypothyroid        Past Surgical History:   Past Surgical History:   Procedure Laterality Date     DILATION AND CURETTAGE N/A 2018    Procedure: SUCTION DILATION AND CURETTAGE;  Surgeon: Roxanna Moody MD;  Location: [facility];  Service:      DILATION AND CURETTAGE, OPERATIVE HYSTEROSCOPY WITH MORCELLATOR, COMBINED N/A 2019    Procedure: HYSTEROSCOPY, POLYPECTOMY, POSSIBLE DILATION AND CURETTAGE ([employer] MORCELLATOR AND FLUID MANAGEMENT);  Surgeon: Katia Rm MD;  Location: [facility]     GYN SURGERY      D & C     TOE SURGERY          Family History:   Family History   Problem Relation Age of Onset     Bipolar Disorder Other      Depression Other      Alcoholism Other      Attention Deficit Disorder Other         Social History:   Social History     Socioeconomic History     Marital status:      Spouse name: Not on file     Number of children: Not on file     Years of education: Not on file     Highest education level: Not on file   Occupational History     Not on file   Tobacco Use     Smoking status: Former Smoker     Quit date: 2018     Years since quittin.4      Smokeless tobacco: Never Used   Substance and Sexual Activity     Alcohol use: Not Currently     Comment: 2/month     Drug use: Never     Sexual activity: Yes     Partners: Male   Other Topics Concern     Parent/sibling w/ CABG, MI or angioplasty before 65F 55M? Not Asked   Social History Narrative     Not on file     Social Determinants of Health     Financial Resource Strain: Not on file   Food Insecurity: Not on file   Transportation Needs: Not on file   Physical Activity: Not on file   Stress: Not on file   Social Connections: Not on file   Intimate Partner Violence: Not on file   Housing Stability: Not on file            Lab Results    Chemistry/lipid CBC Cardiac Enzymes/BNP/TSH/INR   Lab Results   Component Value Date    BUN 19 07/15/2022     07/15/2022    CO2 19 (L) 07/15/2022    Lab Results   Component Value Date    WBC 8.4 07/15/2022    HGB 13.7 07/15/2022    HCT 40.9 07/15/2022    MCV 86 07/15/2022     07/15/2022    Lab Results   Component Value Date    TROPONINI <0.01 07/15/2022    TSH 0.62 07/15/2022    INR 0.99 08/12/2020

## 2022-07-25 ENCOUNTER — HOSPITAL ENCOUNTER (OUTPATIENT)
Dept: CARDIOLOGY | Facility: CLINIC | Age: 37
Discharge: HOME | End: 2022-07-25
Attending: INTERNAL MEDICINE | Admitting: INTERNAL MEDICINE
Payer: COMMERCIAL

## 2022-07-25 DIAGNOSIS — I48.0 PAROXYSMAL ATRIAL FIBRILLATION (H): ICD-10-CM

## 2022-07-25 DIAGNOSIS — R00.2 PALPITATIONS: ICD-10-CM

## 2022-07-25 PROCEDURE — 93270 REMOTE 30 DAY ECG REV/REPORT: CPT

## 2022-07-26 ENCOUNTER — TRANSFERRED RECORDS (OUTPATIENT)
Dept: HEALTH INFORMATION MANAGEMENT | Facility: CLINIC | Age: 37
End: 2022-07-26

## 2022-08-05 PROCEDURE — 93272 ECG/REVIEW INTERPRET ONLY: CPT | Performed by: INTERNAL MEDICINE

## 2022-09-18 ENCOUNTER — HEALTH MAINTENANCE LETTER (OUTPATIENT)
Age: 37
End: 2022-09-18

## 2022-12-18 ENCOUNTER — HOSPITAL ENCOUNTER (EMERGENCY)
Facility: CLINIC | Age: 37
Discharge: HOME | End: 2022-12-18
Attending: EMERGENCY MEDICINE | Admitting: EMERGENCY MEDICINE
Payer: COMMERCIAL

## 2022-12-18 VITALS
BODY MASS INDEX: 48.06 KG/M2 | DIASTOLIC BLOOD PRESSURE: 58 MMHG | RESPIRATION RATE: 18 BRPM | WEIGHT: 280 LBS | HEART RATE: 78 BPM | TEMPERATURE: 97.6 F | OXYGEN SATURATION: 98 % | SYSTOLIC BLOOD PRESSURE: 112 MMHG

## 2022-12-18 DIAGNOSIS — I48.0 PAROXYSMAL ATRIAL FIBRILLATION (H): ICD-10-CM

## 2022-12-18 LAB
ANION GAP SERPL CALCULATED.3IONS-SCNC: 10 MMOL/L (ref 5–18)
ATRIAL RATE - MUSE: 76 BPM
BASOPHILS # BLD AUTO: 0.1 10E3/UL (ref 0–0.2)
BASOPHILS NFR BLD AUTO: 1 %
BUN SERPL-MCNC: 14 MG/DL (ref 8–22)
CALCIUM SERPL-MCNC: 9.3 MG/DL (ref 8.5–10.5)
CHLORIDE BLD-SCNC: 110 MMOL/L (ref 98–107)
CO2 SERPL-SCNC: 20 MMOL/L (ref 22–31)
CREAT SERPL-MCNC: 0.77 MG/DL (ref 0.6–1.1)
DIASTOLIC BLOOD PRESSURE - MUSE: NORMAL MMHG
EOSINOPHIL # BLD AUTO: 0.3 10E3/UL (ref 0–0.7)
EOSINOPHIL NFR BLD AUTO: 3 %
ERYTHROCYTE [DISTWIDTH] IN BLOOD BY AUTOMATED COUNT: 12.3 % (ref 10–15)
GFR SERPL CREATININE-BSD FRML MDRD: >90 ML/MIN/1.73M2
GLUCOSE BLD-MCNC: 123 MG/DL (ref 70–125)
HCT VFR BLD AUTO: 41.9 % (ref 35–47)
HGB BLD-MCNC: 14.2 G/DL (ref 11.7–15.7)
HOLD SPECIMEN: NORMAL
IMM GRANULOCYTES # BLD: 0 10E3/UL
IMM GRANULOCYTES NFR BLD: 0 %
INTERPRETATION ECG - MUSE: NORMAL
LYMPHOCYTES # BLD AUTO: 2.4 10E3/UL (ref 0.8–5.3)
LYMPHOCYTES NFR BLD AUTO: 26 %
MCH RBC QN AUTO: 29.5 PG (ref 26.5–33)
MCHC RBC AUTO-ENTMCNC: 33.9 G/DL (ref 31.5–36.5)
MCV RBC AUTO: 87 FL (ref 78–100)
MONOCYTES # BLD AUTO: 0.6 10E3/UL (ref 0–1.3)
MONOCYTES NFR BLD AUTO: 7 %
NEUTROPHILS # BLD AUTO: 5.8 10E3/UL (ref 1.6–8.3)
NEUTROPHILS NFR BLD AUTO: 63 %
NRBC # BLD AUTO: 0 10E3/UL
NRBC BLD AUTO-RTO: 0 /100
P AXIS - MUSE: 66 DEGREES
PLATELET # BLD AUTO: 352 10E3/UL (ref 150–450)
POTASSIUM BLD-SCNC: 4.6 MMOL/L (ref 3.5–5)
PR INTERVAL - MUSE: 156 MS
QRS DURATION - MUSE: 88 MS
QT - MUSE: 372 MS
QTC - MUSE: 418 MS
R AXIS - MUSE: 46 DEGREES
RBC # BLD AUTO: 4.81 10E6/UL (ref 3.8–5.2)
SODIUM SERPL-SCNC: 140 MMOL/L (ref 136–145)
SYSTOLIC BLOOD PRESSURE - MUSE: NORMAL MMHG
T AXIS - MUSE: 38 DEGREES
T4 FREE SERPL-MCNC: 1 NG/DL (ref 0.9–1.7)
TSH SERPL DL<=0.005 MIU/L-ACNC: 5.16 UIU/ML (ref 0.3–5)
VENTRICULAR RATE- MUSE: 76 BPM
WBC # BLD AUTO: 9.2 10E3/UL (ref 4–11)

## 2022-12-18 PROCEDURE — 93005 ELECTROCARDIOGRAM TRACING: CPT | Performed by: EMERGENCY MEDICINE

## 2022-12-18 PROCEDURE — 99152 MOD SED SAME PHYS/QHP 5/>YRS: CPT

## 2022-12-18 PROCEDURE — 82310 ASSAY OF CALCIUM: CPT | Performed by: EMERGENCY MEDICINE

## 2022-12-18 PROCEDURE — 84443 ASSAY THYROID STIM HORMONE: CPT | Performed by: EMERGENCY MEDICINE

## 2022-12-18 PROCEDURE — 96375 TX/PRO/DX INJ NEW DRUG ADDON: CPT | Mod: 59

## 2022-12-18 PROCEDURE — 36415 COLL VENOUS BLD VENIPUNCTURE: CPT | Performed by: EMERGENCY MEDICINE

## 2022-12-18 PROCEDURE — 99285 EMERGENCY DEPT VISIT HI MDM: CPT | Mod: 25

## 2022-12-18 PROCEDURE — 96376 TX/PRO/DX INJ SAME DRUG ADON: CPT

## 2022-12-18 PROCEDURE — 999N000157 HC STATISTIC RCP TIME EA 10 MIN

## 2022-12-18 PROCEDURE — 96374 THER/PROPH/DIAG INJ IV PUSH: CPT

## 2022-12-18 PROCEDURE — 272N000240 HC CARDIOVERT/DEFIB/PACER SUPP

## 2022-12-18 PROCEDURE — 85025 COMPLETE CBC W/AUTO DIFF WBC: CPT | Performed by: EMERGENCY MEDICINE

## 2022-12-18 PROCEDURE — 84439 ASSAY OF FREE THYROXINE: CPT | Performed by: EMERGENCY MEDICINE

## 2022-12-18 PROCEDURE — 93005 ELECTROCARDIOGRAM TRACING: CPT | Mod: XU | Performed by: EMERGENCY MEDICINE

## 2022-12-18 PROCEDURE — 250N000011 HC RX IP 250 OP 636: Performed by: EMERGENCY MEDICINE

## 2022-12-18 PROCEDURE — 96361 HYDRATE IV INFUSION ADD-ON: CPT | Mod: 59

## 2022-12-18 PROCEDURE — 92960 CARDIOVERSION ELECTRIC EXT: CPT

## 2022-12-18 PROCEDURE — 258N000003 HC RX IP 258 OP 636: Performed by: EMERGENCY MEDICINE

## 2022-12-18 RX ORDER — DILTIAZEM HYDROCHLORIDE 5 MG/ML
15 INJECTION INTRAVENOUS ONCE
Status: COMPLETED | OUTPATIENT
Start: 2022-12-18 | End: 2022-12-18

## 2022-12-18 RX ORDER — PROPOFOL 10 MG/ML
60 INJECTION, EMULSION INTRAVENOUS ONCE
Status: COMPLETED | OUTPATIENT
Start: 2022-12-18 | End: 2022-12-18

## 2022-12-18 RX ADMIN — SODIUM CHLORIDE 1000 ML: 9 INJECTION, SOLUTION INTRAVENOUS at 08:53

## 2022-12-18 RX ADMIN — DILTIAZEM HYDROCHLORIDE 15 MG: 5 INJECTION, SOLUTION INTRAVENOUS at 08:38

## 2022-12-18 RX ADMIN — DILTIAZEM HYDROCHLORIDE 15 MG: 5 INJECTION, SOLUTION INTRAVENOUS at 09:37

## 2022-12-18 RX ADMIN — PROPOFOL 60 MG: 10 INJECTION, EMULSION INTRAVENOUS at 11:05

## 2022-12-18 ASSESSMENT — ENCOUNTER SYMPTOMS
DIAPHORESIS: 0
COUGH: 1
PALPITATIONS: 1
FEVER: 0
CHILLS: 0
LIGHT-HEADEDNESS: 0
VOMITING: 0
SHORTNESS OF BREATH: 0
APPETITE CHANGE: 0

## 2022-12-18 ASSESSMENT — ACTIVITIES OF DAILY LIVING (ADL)
ADLS_ACUITY_SCORE: 35
ADLS_ACUITY_SCORE: 35

## 2022-12-18 NOTE — ED NOTES
TANIKA called to support primary RN and MD for a cardioversion of this patient. Pt was sedated as ordered, had one attempt at 125 jewls with good results. EKG done showing NSR. Was placed on 3 liters of O2 for about 3 min during post procedure. Primary RN to continue to monitor.    ANDRES SETHI RN

## 2022-12-18 NOTE — Clinical Note
Uriel Montgomery accompanied Cherie Montgomery to the emergency department on 12/18/2022. They may return to work on 12/19/2022.      If you have any questions or concerns, please don't hesitate to call.      Rosie Gardner MD

## 2022-12-18 NOTE — Clinical Note
Uriel Motngomery accompanied Cherie Montgomery to the emergency department on 12/18/2022. They may return to work on 12/20/2022.      If you have any questions or concerns, please don't hesitate to call.      Rosie Gardner MD

## 2022-12-18 NOTE — ED TRIAGE NOTES
PT with history of A-fib presents with sensation of irregular heart beat since 0330. Denies chest pain.     Triage Assessment     Row Name 12/18/22 0816       Triage Assessment (Adult)    Airway WDL WDL       Respiratory WDL    Respiratory WDL WDL       Skin Circulation/Temperature WDL    Skin Circulation/Temperature WDL WDL       Cardiac WDL    Cardiac WDL X;rhythm    Pulse Rate & Regularity tachycardic       Peripheral/Neurovascular WDL    Peripheral Neurovascular WDL WDL       Cognitive/Neuro/Behavioral WDL    Cognitive/Neuro/Behavioral WDL WDL

## 2022-12-20 LAB
ATRIAL RATE - MUSE: 178 BPM
DIASTOLIC BLOOD PRESSURE - MUSE: NORMAL MMHG
INTERPRETATION ECG - MUSE: NORMAL
P AXIS - MUSE: NORMAL DEGREES
PR INTERVAL - MUSE: NORMAL MS
QRS DURATION - MUSE: 72 MS
QT - MUSE: 290 MS
QTC - MUSE: 468 MS
R AXIS - MUSE: 48 DEGREES
SYSTOLIC BLOOD PRESSURE - MUSE: NORMAL MMHG
T AXIS - MUSE: 43 DEGREES
VENTRICULAR RATE- MUSE: 157 BPM

## 2022-12-28 ENCOUNTER — TELEPHONE (OUTPATIENT)
Dept: CARDIOLOGY | Facility: CLINIC | Age: 37
End: 2022-12-28

## 2022-12-28 ENCOUNTER — OFFICE VISIT (OUTPATIENT)
Dept: CARDIOLOGY | Facility: CLINIC | Age: 37
End: 2022-12-28
Attending: EMERGENCY MEDICINE
Payer: COMMERCIAL

## 2022-12-28 VITALS
BODY MASS INDEX: 47.72 KG/M2 | OXYGEN SATURATION: 98 % | SYSTOLIC BLOOD PRESSURE: 98 MMHG | DIASTOLIC BLOOD PRESSURE: 78 MMHG | WEIGHT: 278 LBS | HEART RATE: 90 BPM | RESPIRATION RATE: 18 BRPM

## 2022-12-28 DIAGNOSIS — I48.0 PAROXYSMAL ATRIAL FIBRILLATION (H): ICD-10-CM

## 2022-12-28 DIAGNOSIS — D68.61 ANTIPHOSPHOLIPID SYNDROME (H): ICD-10-CM

## 2022-12-28 DIAGNOSIS — E03.9 HYPOTHYROIDISM, UNSPECIFIED TYPE: ICD-10-CM

## 2022-12-28 DIAGNOSIS — R29.818 SUSPECTED SLEEP APNEA: Primary | ICD-10-CM

## 2022-12-28 PROCEDURE — 99215 OFFICE O/P EST HI 40 MIN: CPT | Performed by: GENERAL ACUTE CARE HOSPITAL

## 2022-12-28 RX ORDER — ASPIRIN 81 MG/1
81 TABLET ORAL DAILY
COMMUNITY
End: 2023-02-22

## 2022-12-28 RX ORDER — VERAPAMIL HYDROCHLORIDE 120 MG/1
120 TABLET, FILM COATED, EXTENDED RELEASE ORAL AT BEDTIME
Qty: 90 TABLET | Refills: 3 | Status: SHIPPED | OUTPATIENT
Start: 2022-12-28 | End: 2023-12-26

## 2022-12-28 NOTE — PATIENT INSTRUCTIONS
"We can start a heart rhythm medication called \"verapamil\".  We will also get you back in for a sleep study.  We will get your in to see one of our electrophysiologist to see if you may need an \"ablation\".  "

## 2022-12-28 NOTE — TELEPHONE ENCOUNTER
Health Call Center    Phone Message    May a detailed message be left on voicemail: yes     Reason for Call: Other: Pt states at visit today with Dr. Vargas she forgot to ask about caffiene and alcohol consumpton and if that is safe in regards to afib and the medications she is taking. Pt also wondering what recommended moderation would look like as far as oz per day of caffiene. Please return call to discuss.      Action Taken: Other: Cardiology    Travel Screening: Not Applicable     Thank you!  [facility]

## 2022-12-28 NOTE — LETTER
12/28/2022    Kaita Rm MD  [address]  [facility] [address]    RE: Cherie Montgomery       Dear Colleague,     I had the pleasure of seeing Cherie [facility] in the [facility].  HEART CARE ENCOUNTER NOTE        Assessment/Recommendations   Assessment:    1. Persistent atrial fibrillation status post direct current cardioversions on 7/15/2022 and 12/18/2022. She has had at least three symptomatic episodes requiring treatment. [id number] score is 1 (female gender).  2. Hypothyroidism.  3. Antiphospholipid antibody syndrome manifesting at recurrent miscarriages. No history of venous thromboembolism.  4. Suspected obstructive sleep apnea.  5. BMI 47.72.    Plan:  1. We will start extended-release verapamil 120 mg once daily.  2. Flecainide may also be an option once she has completed breastfeeding.  3. She may be good candidate for an electrophysiology study with ablation, so will have her see one of our electrophysiologist.  4. A new referral to sleep medicine was placed.  5. Continue aspirin 81 mg daily for now.       A total time of 40 minutes was spent on the date of this encounter, with greater than 50% the time spent face-to-face including counseling and coordination of care.    History of Present Illness   Ms. Cherie Montgomery is a 37 year old female with a significant past history of obesity, hypothyroidism, APLA syndrome, and AF s/p DCCV on 7/15/2022 and 12/18/2022 presenting for urgent follow-up. She follows in Atrial Fibrillation clinic with Trevor Parham and has also seen my cardiology colleague, Dr. Blaine Sainz.    She woke up with palpitations prompting her to come to the ED on 12/18/2022. IV diltiazem was not effective so she was successfully cardioverted. She has felt no recurrence since.    She says all three episodes of AF have occurred while sleeping. She does snore and attempted a home sleep study, but was unable to successfully do this. Her thyroid medication has  been adjusted due to concern that this may be precipitating her AF. She has not been on any scheduled anti-arrhythmic drugs, presumably due to concern about her recent pregnancy and continued breastfeeding. She says she is planning on discontinuing breastfeeding soon. APLA syndrome was diagnosed based on repeated miscarriages and subsequent laboratory testing. She was on enoxaparin during both of her successful pregnancies. Otherwise, no DVT/PE.    No chest pain/pressure/tightness, shortness of breath at rest or with exertion, light headedness/dizziness, pre-syncope, syncope, lower extremity swelling, paroxysmal nocturnal dyspnea (PND), or orthopnea.     Cardiac Problems and Cardiac Diagnostics     Most Recent Cardiac testing:  ECG dated 12/18/2022 (personaly reviewed and interpreted): atrial fibrillation with rapid ventricular response of 157 bpm    Event monitor 7/25/2022 (report reviewed):  Cardiac event monitoring from 7/25/2022 to 8/3/2022 (monitored duration 3d 22h 38m).  Baseline rhythm was sinus rhythm 78bpm.    Reported heart rate range 50 to 120bpm, average 74bpm.  2 symptom triggers (other, palpitations) correlated to sinus rhythm 69-87bpm.  No nonsustained or sustained tachyarrhythmias.  No atrial fibrillation.  There were no pauses noted.  Supraventricular and ventricular ectopic beat frequency are not reported on this monitoring modality.      ECHO 6/5/2021 (report reviewed):  Visually Estimated EF: 60-65%   Normal LV size and systolic function.   Normal RV size and systolic function.   No significant valvular heart disease noted.     CTA chest 6/4/2021 (report reviewed):  1.  No pulmonary emboli. Normal thoracic aorta.   2.  CTA chest is negative.  3.  No coronary artery calcification.       Medications  Allergies   Current Outpatient Medications   Medication Sig Dispense Refill     aspirin 81 MG EC tablet Take 81 mg by mouth daily       levothyroxine (SYNTHROID/LEVOTHROID) 50 MCG tablet Take 50 mcg  by mouth daily       Prenatal Vit-Fe Fumarate-FA (PRENATAL PO) Take 1 tablet by mouth daily       Vitamin D3 (CHOLECALCIFEROL) 125 MCG (5000 UT) tablet Take 1 tablet by mouth daily       acetaminophen (TYLENOL) 325 MG tablet Take 2 tablets (650 mg) by mouth every 4 hours as needed for mild pain or fever (Patient not taking: Reported on 7/21/2022) 50 tablet 0     ibuprofen (ADVIL/MOTRIN) 600 MG tablet Take 1 tablet (600 mg) by mouth every 6 hours as needed (Patient not taking: Reported on 7/21/2022) 60 tablet 1     levothyroxine (SYNTHROID/LEVOTHROID) 125 MCG tablet TAKE 1 TABLET BY MOUTH DAILY AS DIRECTED (Patient not taking: Reported on 12/28/2022)        No Known Allergies     Physical Examination Review of Systems   BP 98/78 (BP Location: Left arm, Patient Position: Sitting, Cuff Size: Adult Large)   Pulse 90   Resp 18   Wt 126.1 kg (278 lb)   SpO2 98%   BMI 47.72 kg/m    Body mass index is 47.72 kg/m .  Wt Readings from Last 3 Encounters:   12/28/22 126.1 kg (278 lb)   12/18/22 127 kg (280 lb)   07/21/22 123 kg (271 lb 3.2 oz)       General Appearance:   Pleasant  female, appears  stated age. no acute distress, obese body habitus   ENT/Mouth: Facemask.      EYES:  no scleral icterus, normal conjunctivae   Neck: no carotid bruits. No anterior cervical lymphadenopaty   Respiratory:   lungs are clear to auscultation, no rales or wheezing, equal chest wall expansion    Cardiovascular:   Regular rhythm, normal rate. Normal first and second heart sounds with no murmurs, rubs, or gallops; the carotid, radial and posterior tibial pulses are intact, Jugular venous pressure normal, no edema bilaterally    Abdomen/GI:  no organomegaly, masses, bruits, or tenderness; bowel sounds are present   Extremities: no cyanosis or clubbing   Skin: no xanthelasma, warm.    Heme/lymph/ Immunology No apparent bleeding noted.   Neurologic: Alert and oriented. normal gait, no tremors     Psychiatric: Pleasant, calm, appropriate  affect.    A complete 10 system review of systems was performed and is negative except as mentioned in the HPI/subjective.         Past History   Past Medical History:   Past Medical History:   Diagnosis Date     Anti-phospholipid antibody syndrome (H)      Atrial fibrillation (H)      Dysmenorrhea      Hypertension     with second full term pregnancy     Obese      Thyroid disease     hypothyroid       Past Surgical History:   Past Surgical History:   Procedure Laterality Date     DILATION AND CURETTAGE N/A 2018    Procedure: SUCTION DILATION AND CURETTAGE;  Surgeon: Roxanna Moody MD;  Location: [facility];  Service:      DILATION AND CURETTAGE, OPERATIVE HYSTEROSCOPY WITH MORCELLATOR, COMBINED N/A 2019    Procedure: HYSTEROSCOPY, POLYPECTOMY, POSSIBLE DILATION AND CURETTAGE ([employer] MORCELLATOR AND FLUID MANAGEMENT);  Surgeon: Katia Rm MD;  Location:  OR     GYN SURGERY      D & C     TOE SURGERY         Family History:   Family History   Problem Relation Age of Onset     Bipolar Disorder Other      Depression Other      Alcoholism Other      Attention Deficit Disorder Other        Social History:   Social History     Socioeconomic History     Marital status:      Spouse name: Not on file     Number of children: Not on file     Years of education: Not on file     Highest education level: Not on file   Occupational History     Not on file   Tobacco Use     Smoking status: Former     Types: Cigarettes     Quit date: 2018     Years since quittin.9     Smokeless tobacco: Never   Substance and Sexual Activity     Alcohol use: Not Currently     Comment: 2/month     Drug use: Never     Sexual activity: Yes     Partners: Male   Other Topics Concern     Parent/sibling w/ CABG, MI or angioplasty before 65F 55M? Not Asked   Social History Narrative     Not on file     Social Determinants of Health     Financial Resource Strain: Not on file   Food Insecurity: Not on file    Transportation Needs: Not on file   Physical Activity: Not on file   Stress: Not on file   Social Connections: Not on file   Intimate Partner Violence: Not on file   Housing Stability: Not on file              Lab Results    Chemistry/lipid CBC Cardiac Enzymes/BNP/TSH/INR   Lab Results   Component Value Date    CR 0.77 12/18/2022    BUN 14 12/18/2022    POTASSIUM 4.6 12/18/2022     12/18/2022    CO2 20 (L) 12/18/2022      Lab Results   Component Value Date    WBC 9.2 12/18/2022    HGB 14.2 12/18/2022    HCT 41.9 12/18/2022    MCV 87 12/18/2022     12/18/2022      Lab Results   Component Value Date    TROPONINI <0.01 07/15/2022    TSH 5.16 (H) 12/18/2022    INR 0.99 08/12/2020          Saran Vargas MD [facility]  Non-Invasive Cardiologist  [facility] Heart Care  Pager [phone]    Thank you for allowing me to participate in the care of your patient.      Sincerely,     Saran Vargas MD     [facility] Heart Care  cc:   Rosie Gardner MD  [address] [place] [address]

## 2022-12-28 NOTE — PROGRESS NOTES
HEART CARE ENCOUNTER NOTE        Assessment/Recommendations   Assessment:    1. Persistent atrial fibrillation status post direct current cardioversions on 7/15/2022 and 12/18/2022. She has had at least three symptomatic episodes requiring treatment. [id number] score is 1 (female gender).  2. Hypothyroidism.  3. Antiphospholipid antibody syndrome manifesting at recurrent miscarriages. No history of venous thromboembolism.  4. Suspected obstructive sleep apnea.  5. BMI 47.72.    Plan:  1. We will start extended-release verapamil 120 mg once daily.  2. Flecainide may also be an option once she has completed breastfeeding.  3. She may be good candidate for an electrophysiology study with ablation, so will have her see one of our electrophysiologist.  4. A new referral to sleep medicine was placed.  5. Continue aspirin 81 mg daily for now.       A total time of 40 minutes was spent on the date of this encounter, with greater than 50% the time spent face-to-face including counseling and coordination of care.    History of Present Illness   Ms. Cherie Montgomery is a 37 year old female with a significant past history of obesity, hypothyroidism, APLA syndrome, and AF s/p DCCV on 7/15/2022 and 12/18/2022 presenting for urgent follow-up. She follows in Atrial Fibrillation clinic with Trevor Parham and has also seen my cardiology colleague, Dr. Blaine Sainz.    She woke up with palpitations prompting her to come to the ED on 12/18/2022. IV diltiazem was not effective so she was successfully cardioverted. She has felt no recurrence since.    She says all three episodes of AF have occurred while sleeping. She does snore and attempted a home sleep study, but was unable to successfully do this. Her thyroid medication has been adjusted due to concern that this may be precipitating her AF. She has not been on any scheduled anti-arrhythmic drugs, presumably due to concern about her recent pregnancy and continued breastfeeding. She  says she is planning on discontinuing breastfeeding soon. APLA syndrome was diagnosed based on repeated miscarriages and subsequent laboratory testing. She was on enoxaparin during both of her successful pregnancies. Otherwise, no DVT/PE.    No chest pain/pressure/tightness, shortness of breath at rest or with exertion, light headedness/dizziness, pre-syncope, syncope, lower extremity swelling, paroxysmal nocturnal dyspnea (PND), or orthopnea.     Cardiac Problems and Cardiac Diagnostics     Most Recent Cardiac testing:  ECG dated 12/18/2022 (personaly reviewed and interpreted): atrial fibrillation with rapid ventricular response of 157 bpm    Event monitor 7/25/2022 (report reviewed):  Cardiac event monitoring from 7/25/2022 to 8/3/2022 (monitored duration 3d 22h 38m).  Baseline rhythm was sinus rhythm 78bpm.    Reported heart rate range 50 to 120bpm, average 74bpm.  2 symptom triggers (other, palpitations) correlated to sinus rhythm 69-87bpm.  No nonsustained or sustained tachyarrhythmias.  No atrial fibrillation.  There were no pauses noted.  Supraventricular and ventricular ectopic beat frequency are not reported on this monitoring modality.      ECHO 6/5/2021 (report reviewed):  Visually Estimated EF: 60-65%   Normal LV size and systolic function.   Normal RV size and systolic function.   No significant valvular heart disease noted.     CTA chest 6/4/2021 (report reviewed):  1.  No pulmonary emboli. Normal thoracic aorta.   2.  CTA chest is negative.  3.  No coronary artery calcification.       Medications  Allergies   Current Outpatient Medications   Medication Sig Dispense Refill     aspirin 81 MG EC tablet Take 81 mg by mouth daily       levothyroxine (SYNTHROID/LEVOTHROID) 50 MCG tablet Take 50 mcg by mouth daily       Prenatal Vit-Fe Fumarate-FA (PRENATAL PO) Take 1 tablet by mouth daily       Vitamin D3 (CHOLECALCIFEROL) 125 MCG (5000 UT) tablet Take 1 tablet by mouth daily       acetaminophen (TYLENOL)  325 MG tablet Take 2 tablets (650 mg) by mouth every 4 hours as needed for mild pain or fever (Patient not taking: Reported on 7/21/2022) 50 tablet 0     ibuprofen (ADVIL/MOTRIN) 600 MG tablet Take 1 tablet (600 mg) by mouth every 6 hours as needed (Patient not taking: Reported on 7/21/2022) 60 tablet 1     levothyroxine (SYNTHROID/LEVOTHROID) 125 MCG tablet TAKE 1 TABLET BY MOUTH DAILY AS DIRECTED (Patient not taking: Reported on 12/28/2022)        No Known Allergies     Physical Examination Review of Systems   BP 98/78 (BP Location: Left arm, Patient Position: Sitting, Cuff Size: Adult Large)   Pulse 90   Resp 18   Wt 126.1 kg (278 lb)   SpO2 98%   BMI 47.72 kg/m    Body mass index is 47.72 kg/m .  Wt Readings from Last 3 Encounters:   12/28/22 126.1 kg (278 lb)   12/18/22 127 kg (280 lb)   07/21/22 123 kg (271 lb 3.2 oz)       General Appearance:   Pleasant  female, appears  stated age. no acute distress, obese body habitus   ENT/Mouth: Facemask.      EYES:  no scleral icterus, normal conjunctivae   Neck: no carotid bruits. No anterior cervical lymphadenopaty   Respiratory:   lungs are clear to auscultation, no rales or wheezing, equal chest wall expansion    Cardiovascular:   Regular rhythm, normal rate. Normal first and second heart sounds with no murmurs, rubs, or gallops; the carotid, radial and posterior tibial pulses are intact, Jugular venous pressure normal, no edema bilaterally    Abdomen/GI:  no organomegaly, masses, bruits, or tenderness; bowel sounds are present   Extremities: no cyanosis or clubbing   Skin: no xanthelasma, warm.    Heme/lymph/ Immunology No apparent bleeding noted.   Neurologic: Alert and oriented. normal gait, no tremors     Psychiatric: Pleasant, calm, appropriate affect.    A complete 10 system review of systems was performed and is negative except as mentioned in the HPI/subjective.         Past History   Past Medical History:   Past Medical History:   Diagnosis Date      Anti-phospholipid antibody syndrome (H)      Atrial fibrillation (H)      Dysmenorrhea      Hypertension     with second full term pregnancy     Obese      Thyroid disease     hypothyroid       Past Surgical History:   Past Surgical History:   Procedure Laterality Date     DILATION AND CURETTAGE N/A 2018    Procedure: SUCTION DILATION AND CURETTAGE;  Surgeon: Roxanna Moody MD;  Location: [facility];  Service:      DILATION AND CURETTAGE, OPERATIVE HYSTEROSCOPY WITH MORCELLATOR, COMBINED N/A 2019    Procedure: HYSTEROSCOPY, POLYPECTOMY, POSSIBLE DILATION AND CURETTAGE ([employer] MORCELLATOR AND FLUID MANAGEMENT);  Surgeon: Katia Rm MD;  Location:  OR     GYN SURGERY      D & C     TOE SURGERY         Family History:   Family History   Problem Relation Age of Onset     Bipolar Disorder Other      Depression Other      Alcoholism Other      Attention Deficit Disorder Other        Social History:   Social History     Socioeconomic History     Marital status:      Spouse name: Not on file     Number of children: Not on file     Years of education: Not on file     Highest education level: Not on file   Occupational History     Not on file   Tobacco Use     Smoking status: Former     Types: Cigarettes     Quit date: 2018     Years since quittin.9     Smokeless tobacco: Never   Substance and Sexual Activity     Alcohol use: Not Currently     Comment: 2/month     Drug use: Never     Sexual activity: Yes     Partners: Male   Other Topics Concern     Parent/sibling w/ CABG, MI or angioplasty before 65F 55M? Not Asked   Social History Narrative     Not on file     Social Determinants of Health     Financial Resource Strain: Not on file   Food Insecurity: Not on file   Transportation Needs: Not on file   Physical Activity: Not on file   Stress: Not on file   Social Connections: Not on file   Intimate Partner Violence: Not on file   Housing Stability: Not on file               Lab Results    Chemistry/lipid CBC Cardiac Enzymes/BNP/TSH/INR   Lab Results   Component Value Date    CR 0.77 12/18/2022    BUN 14 12/18/2022    POTASSIUM 4.6 12/18/2022     12/18/2022    CO2 20 (L) 12/18/2022      Lab Results   Component Value Date    WBC 9.2 12/18/2022    HGB 14.2 12/18/2022    HCT 41.9 12/18/2022    MCV 87 12/18/2022     12/18/2022      Lab Results   Component Value Date    TROPONINI <0.01 07/15/2022    TSH 5.16 (H) 12/18/2022    INR 0.99 08/12/2020          Saran Vargas MD [facility]  Non-Invasive Cardiologist  [facility]  Pager [phone]

## 2022-12-29 NOTE — TELEPHONE ENCOUNTER
Reached out to patient with recommendations below. Patient verbalized understanding and is thankful for the call. Follow up as already scheduled 1/10/2022.  -------------------------------------------------  Saran Vargas MD  You 15 hours ago (4:14 PM)     BW  No specific recommendations on caffeine as the link between this and atrial fibrillation is not entirely clear. 1-2 caffeine beverages per day is likely okay, but if palpitations feels worse on caffeine I would then limit this.     Same with alcohol, although excess alcohol can certainly limit atrial fibrillation. I would stay within the guideline recommendations for alcohol safety, which is no more than 2 drinks on one occasion or more than 7 drinks in a week for a woman.      You  Saran Vargas MD 20 hours ago (10:55 AM)     KF  Dr. Vargas,   Please see message below.   Thank you,   Arlene

## 2023-01-04 ENCOUNTER — TELEPHONE (OUTPATIENT)
Dept: PODIATRY | Facility: CLINIC | Age: 38
End: 2023-01-04

## 2023-01-04 NOTE — TELEPHONE ENCOUNTER
[facility] Call Center    Phone Message    May a detailed message be left on voicemail: yes     Reason for Call: Other: Patient saw saw Dr. Cheung in June 2022 and wants to know next steps, i.e. boot, physical therapy, etc?      Patient didn't know if she had to come back in to be seen, so she made an appt for tomorrow    Please reach out to patient if she does NOT need to come in.  She is aware if she doesn't hear from anyone to keep appt for tomorrow (1/5).    Action Taken: Message routed to:  Other: [facility]    Travel Screening: Not Applicable

## 2023-01-05 NOTE — TELEPHONE ENCOUNTER
Patient rescheduled her appointment to Dr. Painter for 1/11/23 because she wanted a closer location. Closing encounter.     JS Barrera RN

## 2023-01-10 ENCOUNTER — OFFICE VISIT (OUTPATIENT)
Dept: CARDIOLOGY | Facility: CLINIC | Age: 38
End: 2023-01-10
Attending: GENERAL ACUTE CARE HOSPITAL
Payer: COMMERCIAL

## 2023-01-10 VITALS
HEART RATE: 71 BPM | SYSTOLIC BLOOD PRESSURE: 122 MMHG | RESPIRATION RATE: 16 BRPM | HEIGHT: 64 IN | DIASTOLIC BLOOD PRESSURE: 78 MMHG | WEIGHT: 270 LBS | BODY MASS INDEX: 46.1 KG/M2

## 2023-01-10 DIAGNOSIS — I48.0 PAROXYSMAL ATRIAL FIBRILLATION (H): Primary | ICD-10-CM

## 2023-01-10 PROCEDURE — 99244 OFF/OP CNSLTJ NEW/EST MOD 40: CPT | Performed by: INTERNAL MEDICINE

## 2023-01-10 NOTE — LETTER
1/10/2023    Katia Rm MD  [address]  [place] [address]    RE: Cherie Montgomery       Dear Colleague,     I had the pleasure of seeing Cherie Montgomery in the [facility].     [facility]  Cardiac Electrophysiology  [address]  [place] [address]   Office: [phone]  Fax: [phone]     Cardiac Electrophysiology Consultation    Patient: Cherie Montgomery   : 1985     Referring Provider: Saran Vargas MD  Primary Care Provider: Katia Rm MD    CHIEF COMPLAINT/REASON FOR CONSULTATION  Paroxysmal atrial fibrillation    Assessment/Recommendations   Cherie Montgomery is a 37 year old female with paroxysmal atrial fibrillation, APLAS, hypothyroidism, obesity referred by Dr. Vargas for consultation regarding atrial fibrillation.    Paroxysmal atrial fibrillation - symptomatic with palpitations  [id number] 0-1  We reviewed atrial fibrillation physiology and management considerations including managing stroke risk, rate control, cardioversion, antiarrhythmic drug therapy, and catheter ablation.  We discussed atrial fibrillation ablation procedures, anticipated success rates, the potential need for re-do ablation vs addition of anti-arrhythmic drugs, procedural risks (including groin bleeding, tamponade, phrenic or esophageal injury, stroke, pulmonary vein stenosis) and recovery expectations.  She will consider options further, though is anxious regarding her AF episodes and is motivated towards suppressing these as best able  - continue verapamil SR 120mg daily for now  - cardiac rhythm monitoring for 2 weeks  - sleep medicine consultation  - exercise-ECG in case of pill in pocket flecainide and metoprolol use  - continue to work on healthy lifestyles and aerobic activity  - follow-up visit in 2 months  - we discussed the ongoing importance of lifestyle modification (maintaining a healthy weight, sleep apnea diagnosis and  "management, alcohol avoidance) as part of a long term strategy for atrial fibrillation management    Follow up: as above         History of Present Illness   Cherie Montgomery is a 37 year old female with paroxysmal atrial fibrillation, APLAS, hypothyroidism, obesity referred by Dr. Vargas for consultation regarding atrial fibrillation.    Mrs. Montgomery notes a first episode of atrial fibrillation around 2021, a second episode 7/15/2022, and a third episode 12/18/2022 - she has needed cardioversion 7/15/2022 and 12/18/2022.  Her longest episode to date prior to DCCV was ~6hrs.  All occurrences have occurred at night and awaken her from sleep.  She is anxious regarding AF episodes.    She notes intermittent isolated palpitations.  She is presently breastfeeding, though anticipates completion in ~3mo.  She denies chest pain, syncope.  She has two young children.       Physical Examination  Review of Systems   VITALS: /78 (BP Location: Right arm, Patient Position: Sitting, Cuff Size: Adult Large)   Pulse 71   Resp 16   Ht 1.626 m (5' 4\")   Wt 122.5 kg (270 lb)   BMI 46.35 kg/m    Wt Readings from Last 3 Encounters:   12/28/22 126.1 kg (278 lb)   12/18/22 127 kg (280 lb)   07/21/22 123 kg (271 lb 3.2 oz)     CONSTITUTIONAL: well nourished, comfortable, no distress  EYES:  Conjunctivae pink, sclerae clear.    E/N/T:  Oral mucosa pink  RESPIRATORY:  Respiratory effort is normal  CARDIOVASCULAR:  normal S1 and S2  GASTROINTESTINAL:  Abdomen without masses or tenderness  EXTREMITIES:  No clubbing or cyanosis.    MUSCULOSKELETAL:  Overall grossly normal muscle strength  SKIN:  Overall, skin warm and dry, no lesions.  NEURO/PSYCH:  Oriented x 3 with normal affect.   Constitutional:  No weight loss or loss of appetite    Eyes:  No difficulty with vision, no double vision, no dry eyes  ENT:  No sore throat, difficulty swallowing; changes in hearing or tinnitus  Cardiovascular: As detailed above  Respiratory:  No " cough  Musculoskeletal  No joint pain, muscle aches  Neurologic:  No syncope, lightheadedness, fainting spells   Hematologic: No easy bruising, excessive bleeding tendency   Gastrointestinal:  No jaundice, abdominal pain or abdominal bloating  Genitourinary: No changes in urinary habits, no trouble urinating    Psychiatric: No anxiety or depression      Medical History  Surgical History   Past Medical History:   Diagnosis Date     Anti-phospholipid antibody syndrome (H)      Atrial fibrillation (H)      Dysmenorrhea      Hypertension     with second full term pregnancy     Obese      Thyroid disease     hypothyroid    Past Surgical History:   Procedure Laterality Date     DILATION AND CURETTAGE N/A 2018    Procedure: SUCTION DILATION AND CURETTAGE;  Surgeon: Roxanna Moody MD;  Location: [facility];  Service:      DILATION AND CURETTAGE, OPERATIVE HYSTEROSCOPY WITH MORCELLATOR, COMBINED N/A 2019    Procedure: HYSTEROSCOPY, POLYPECTOMY, POSSIBLE DILATION AND CURETTAGE ([employer] MORCELLATOR AND FLUID MANAGEMENT);  Surgeon: Katia Rm MD;  Location:  OR     GYN SURGERY      D & C     TOE SURGERY           Family History Social History   Family History   Problem Relation Age of Onset     Bipolar Disorder Other      Depression Other      Alcoholism Other      Attention Deficit Disorder Other         Social History     Tobacco Use     Smoking status: Former     Types: Cigarettes     Quit date: 2018     Years since quittin.9     Smokeless tobacco: Never   Substance Use Topics     Alcohol use: Not Currently     Comment: 2/month     Drug use: Never         Medications  Allergies     Current Outpatient Medications:      acetaminophen (TYLENOL) 325 MG tablet, Take 2 tablets (650 mg) by mouth every 4 hours as needed for mild pain or fever (Patient not taking: Reported on 2022), Disp: 50 tablet, Rfl: 0     aspirin 81 MG EC tablet, Take 81 mg by mouth daily, Disp: , Rfl:       ibuprofen (ADVIL/MOTRIN) 600 MG tablet, Take 1 tablet (600 mg) by mouth every 6 hours as needed (Patient not taking: Reported on 7/21/2022), Disp: 60 tablet, Rfl: 1     levothyroxine (SYNTHROID/LEVOTHROID) 125 MCG tablet, TAKE 1 TABLET BY MOUTH DAILY AS DIRECTED (Patient not taking: Reported on 12/28/2022), Disp: , Rfl:      levothyroxine (SYNTHROID/LEVOTHROID) 50 MCG tablet, Take 50 mcg by mouth daily, Disp: , Rfl:      Prenatal Vit-Fe Fumarate-FA (PRENATAL PO), Take 1 tablet by mouth daily, Disp: , Rfl:      verapamil ER (CALAN-SR) 120 MG CR tablet, Take 1 tablet (120 mg) by mouth At Bedtime, Disp: 90 tablet, Rfl: 3     Vitamin D3 (CHOLECALCIFEROL) 125 MCG (5000 UT) tablet, Take 1 tablet by mouth daily, Disp: , Rfl:    No Known Allergies       Lab Results    Chemistry CBC Cardiac Enzymes/BNP/TSH/INR   Recent Labs   Lab Test 12/18/22  0824      POTASSIUM 4.6   CHLORIDE 110*   CO2 20*      BUN 14   CR 0.77   GFRESTIMATED >90   BRIEN 9.3     Recent Labs   Lab Test 12/18/22  0824 07/15/22  0219 04/19/22  1117   CR 0.77 0.97 0.61  0.62          Recent Labs   Lab Test 12/18/22  0824   WBC 9.2   HGB 14.2   HCT 41.9   MCV 87        Recent Labs   Lab Test 12/18/22  0824 07/15/22  0219 04/21/22  0030   HGB 14.2 13.7 11.4*    Recent Labs   Lab Test 07/15/22  0219   TROPONINI <0.01     No results for input(s): BNP, NTBNPI, NTBNP in the last 75870 hours.  Recent Labs   Lab Test 12/18/22  0824   TSH 5.16*     Recent Labs   Lab Test 08/12/20  1106   INR 0.99         Data Review    ECGs (tracings independently reviewed)  12/18/2022 - SR 76bpm  12/18/2022 - AF, ventricular rate 157bpm  7/15/2022 - AF, ventricular rate 150bpm    Cardiac event monitoring from 7/25/2022 to 8/3/2022 (monitored duration 3d 22h 38m) (independently reviewed)  Baseline rhythm was sinus rhythm 78bpm.    Reported heart rate range 50 to 120bpm, average 74bpm.  2 symptom triggers (other, palpitations) correlated to sinus rhythm  69-87bpm.  No nonsustained or sustained tachyarrhythmias.  No atrial fibrillation.  There were no pauses noted.  Supraventricular and ventricular ectopic beat frequency are not reported on this monitoring modality.      6/5/2021 TTE  Visually Estimated EF: 60-65%   Normal LV size and systolic function.   Normal RV size and systolic function.   No significant valvular heart disease noted.        45 minutes was spent reviewing the chart and in direct discussion with the patient.    Cc: Saran Vargas MD, Katia Rm MD Amila Dilusha William, MD  1/10/2023  4:36 PM        Thank you for allowing me to participate in the care of your patient.      Sincerely,     Shira Lowe MD     [facility]  cc:   Saran Vargas MD  [address]  [place] [address]

## 2023-01-10 NOTE — PATIENT INSTRUCTIONS
[facility]  Cardiac Electrophysiology  [address]  [place] [address]   Office: [phone]  Fax: [phone]       Thank you for seeing us in clinic today - it is a pleasure to be a part of your care team.  Below is a summary of our plan from today's visit.       You have paroxysmal atrial fibrillation.  We reviewed physiology and management options including antiarrhythmic drug therapy (either suppressive of pill in pocket), catheter ablation and lifestyle modification.  We will plan for the following:  - cardiac rhythm monitoring for 2 weeks  - sleep medicine consultation  - exercise-ECG stress test  - continue to work on healthy lifestyles and aerobic activity  - follow-up visit in 2 months     Please do not hesitate to be in touch with our office at [phone] with any questions that may arise.       Thank you for trusting us with your care,    Shira Lowe MD  Clinical Cardiac Electrophysiology  [facility]  [address]  [place] [address]   Office: [phone]  Fax: [phone]              ATRIAL FIBRILLATION: Patient Information    What is atrial fibrillation?  Atrial fibrillation (AF, A-fib) is a common heart rhythm problem (arrhythmia) occurring within the upper chambers of the heart (the atria).  In normal rhythm, the upper and lower chambers of the heart are electrically driven to contract in a coordinated sequence.  In atrial fibrillation, the atria lose their ability to contract because of rapid and chaotic electrical activity.  The lower chambers of the heart (the ventricles) continue to pump blood throughout the body, though with irregular and often faster rate due to the chaotic activity within the atria.        How do I know if I have atrial fibrillation?   Some people may feel their heart beating faster, harder, or irregularly while in atrial fibrillation.  Others may be lightheaded, fatigued, feel  weak or tired or become more short of breath especially with activities.  Some patients have no symptoms at all.  Atrial fibrillation may be found due to an irregular pulse or on an electrocardiogram (ECG). Atrial fibrillation can start and stop on its own, and episodes can last from seconds to several months.      How common is atrial fibrillation?   An estimated 3-6 million people in the [place] have atrial fibrillation.  Atrial fibrillation is a common heart rhythm problem for older persons, affecting as estimated 12-15% of people over the age of 65 years of age.    What causes atrial fibrillation?   Age is the most important risk factor for atrial fibrillation.  Atrial fibrillation is more common in people with other heart disease, high blood pressure, diabetes, obesity, sleep apnea and in people who regularly consume alcohol.  Surgery, lung disease, or thyroid problems can lead to atrial fibrillation.  Atrial fibrillation has multiple possible causes, and in most cases a single cause cannot be found.  Atrial fibrillation is a progressive condition, usually starting with at an early stage with short and infrequent episodes.  In later stages of disease, more frequent and longer lasting episodes of atrial fibrillation occur, ultimately culminating in episodes which do not spontaneously terminate.  Generally, more enlargement and scarring within the upper chambers of the heart is observed as atrial fibrillation progresses from early to late-stage disease.    How is atrial fibrillation diagnosed and evaluated?    Because of its start-stop nature, atrial fibrillation can be challenging to diagnose.  Atrial fibrillation is most commonly diagnosed via cardiac rhythm recordings - either an ECG or wearable cardiac rhythm monitor.  For patients with pacemakers, defibrillators or implantable loop recorders, atrial fibrillation may be recorded via these devices.  Recently, commercially available devices (eg. Apple  Watch, Kardia device, certain FitBit devices, others) can allow patients to take 30 second cardiac rhythm recordings which may document atrial fibrillation.  Once atrial fibrillation is diagnosed, additional tests include blood tests and an echocardiogram.  The echocardiogram uses ultrasound to look at your heart to assess your cardiac function and evaluate for other heart disease.  Additional evaluation may include CT or MRI studies.    Is atrial fibrillation dangerous?   Atrial fibrillation is not usually a life-threatening arrhythmia.  The most serious consequences of atrial fibrillation including stroke and worsening of overall cardiac function.  While in atrial fibrillation, the upper cardiac chambers do not contract normally, resulting in slower blood flow and increased risk of clot formation.  If this blood clot becomes detached from the heart a stroke can occur.  Unfortunately, stroke can be the first sign of atrial fibrillation for some people.  With a stroke, you may notice abnormal sensation, weakness on one side of the body or face, changes in your vision or speech.  If you have any of these signs, you should contact EMS and be evaluated in an emergency room as soon as possible.      How is atrial fibrillation treated?     Several treatment options exist for suppressing atrial fibrillation - however, it is not an easily curable arrhythmia.  The first goal in managing atrial fibrillation is to minimize stroke risk.  The second goal is to improve symptoms associated with atrial fibrillation.  Finally, in patients with reduced cardiac function, maintaining normal rhythm can help improve cardiac function.      Blood thinners are used to reduce the risk of stroke in patients with high estimated stroke risk related to atrial fibrillation.  For patients at higher risk of bleeding related to blood thinner use, implantable devices may be an option to reduce stroke risk without the need for long term blood thinner  use.      Atrial fibrillation can be managed via two strategies: rate control and rhythm control.  In a rate control strategy, continued atrial fibrillation is accepted and medications (eg. beta-blockers or calcium channel blockers) are used to control the lower chamber rate.  In a rhythm control strategy, anti-arrhythmic medications or catheter ablation are used to maintain normal cardiac rhythm and slow disease progression by suppressing atrial fibrillation.  A procedure called a cardioversion, in which an electric shock is delivered through patches placed on the chest wall while under deep sedation, can be performed to temporarily restore normal cardiac rhythm, though does not address the chance of atrial fibrillation recurrence.  Treatments are more effective for earlier rather than later stage atrial fibrillation.  Lifestyle modifications (maintaining a healthy weight, aerobic exercise, diagnosing and treating sleep apnea, and minimizing alcohol intake) are important elements of atrial fibrillation rhythm control.     What is catheter ablation for atrial fibrillation?  Cardiac catheter ablation is a commonly performed, minimally invasive procedure performed by a cardiac electrophysiologist to treat many different cardiac rhythm abnormalities.  During catheter ablation, long, thin, flexible tubes are advanced into the heart via small sheaths inserted into the femoral veins and thermal energy (either heating or cooling) is applied within the heart to disrupt abnormal electrical activity.  Atrial fibrillation ablation is performed under general anesthesia, with procedures generally taking approximately 2-3 hours.  Patients are typically observed for 3-5 hours after the ablation, and in most cases can be discharged home the same day.  Atrial fibrillation ablation is associated with better outcomes (mortality, cardiovascular hospitalizations, atrial arrhythmia recurrences) compared to antiarrhythmic drug therapy.   However, atrial fibrillation recurrences are not uncommon, and repeat catheter ablation may be offered.  Your electrophysiology team can review atrial fibrillation ablation, anticipated success rates, risks, and recovery expectations with you.    What are anti-arrhythmic medications?  Anti-arrhythmic medications are specialized drugs which alter cardiac electrical functioning to suppress arrhythmias.  There are several anti-arrhythmic medications available, each with its own success rate and side effects.  Some anti-arrhythmic medications are less effective though safer to use, others are more effective though have serious potential toxicities.  Atrial fibrillation recurrences are common and may require dose adjustment or change in antiarrhythmic therapy.  Your electrophysiology team will carefully consider which medication would be the best and safest for your particular case.      Can I live a normal life?    The goal of atrial fibrillation management is for patients to live normal lives without being limited by symptoms related to atrial fibrillation.    Are any additional educational resources available?  There are a number of excellent atrial fibrillation education resources available to you online.  A few options you may wish to review include:  hrsonline.org/guide-atrial-fibrillation  afibmatters.org  getsmartaboutafib.com  stopaf.com    What comes next?    Consider your management options and let us know how we can help in your decision process.  Please take medications as they have been prescribed.  You should also get any tests that may have been ordered for you.      When to Call Your Doctor or seek emergency care:  Call your doctor or seek emergency care if you have any significant changes with the following:  Weakness  Dizziness  Fainting  Fatigue  Shortness of breath  Chest pain with increased activity  If you are concerned that your heart rate is too fast or too slow  Bleeding that does not stop in 10  minutes  Coughing or throwing up blood  Bloody diarrhea or bleeding hemorrhoids  Dark-colored urine or black stool  Allergic reactions:  Rash  Itching  Swelling  Trouble breathing or swallowing      Please call the [facility] at [phone] if you have concerns about your symptoms, your medicines, or your follow-up appointments.

## 2023-01-10 NOTE — PROGRESS NOTES
[facility]  Cardiac Electrophysiology  [address]  [place] [address]   Office: [phone]  Fax: [phone]     Cardiac Electrophysiology Consultation    Patient: Cherie Montgomery   : 1985     Referring Provider: Saran Vargas MD  Primary Care Provider: Katia Rm MD    CHIEF COMPLAINT/REASON FOR CONSULTATION  Paroxysmal atrial fibrillation    Assessment/Recommendations   Cherie Montgomery is a 37 year old female with paroxysmal atrial fibrillation, APLAS, hypothyroidism, obesity referred by Dr. Vargas for consultation regarding atrial fibrillation.    Paroxysmal atrial fibrillation - symptomatic with palpitations  [id number]  We reviewed atrial fibrillation physiology and management considerations including managing stroke risk, rate control, cardioversion, antiarrhythmic drug therapy, and catheter ablation.  We discussed atrial fibrillation ablation procedures, anticipated success rates, the potential need for re-do ablation vs addition of anti-arrhythmic drugs, procedural risks (including groin bleeding, tamponade, phrenic or esophageal injury, stroke, pulmonary vein stenosis) and recovery expectations.  She will consider options further, though is anxious regarding her AF episodes and is motivated towards suppressing these as best able  - continue verapamil SR 120mg daily for now  - cardiac rhythm monitoring for 2 weeks  - sleep medicine consultation  - exercise-ECG in case of pill in pocket flecainide and metoprolol use  - continue to work on healthy lifestyles and aerobic activity  - follow-up visit in 2 months  - we discussed the ongoing importance of lifestyle modification (maintaining a healthy weight, sleep apnea diagnosis and management, alcohol avoidance) as part of a long term strategy for atrial fibrillation management    Follow up: as above         History of Present Illness   Cherie Montgomery is a 37 year old female with paroxysmal atrial  "fibrillation, APLAS, hypothyroidism, obesity referred by Dr. Vargas for consultation regarding atrial fibrillation.    Mrs. Montgomery notes a first episode of atrial fibrillation around 2021, a second episode 7/15/2022, and a third episode 12/18/2022 - she has needed cardioversion 7/15/2022 and 12/18/2022.  Her longest episode to date prior to DCCV was ~6hrs.  All occurrences have occurred at night and awaken her from sleep.  She is anxious regarding AF episodes.    She notes intermittent isolated palpitations.  She is presently breastfeeding, though anticipates completion in ~3mo.  She denies chest pain, syncope.  She has two young children.       Physical Examination  Review of Systems   VITALS: /78 (BP Location: Right arm, Patient Position: Sitting, Cuff Size: Adult Large)   Pulse 71   Resp 16   Ht 1.626 m (5' 4\")   Wt 122.5 kg (270 lb)   BMI 46.35 kg/m    Wt Readings from Last 3 Encounters:   12/28/22 126.1 kg (278 lb)   12/18/22 127 kg (280 lb)   07/21/22 123 kg (271 lb 3.2 oz)     CONSTITUTIONAL: well nourished, comfortable, no distress  EYES:  Conjunctivae pink, sclerae clear.    E/N/T:  Oral mucosa pink  RESPIRATORY:  Respiratory effort is normal  CARDIOVASCULAR:  normal S1 and S2  GASTROINTESTINAL:  Abdomen without masses or tenderness  EXTREMITIES:  No clubbing or cyanosis.    MUSCULOSKELETAL:  Overall grossly normal muscle strength  SKIN:  Overall, skin warm and dry, no lesions.  NEURO/PSYCH:  Oriented x 3 with normal affect.   Constitutional:  No weight loss or loss of appetite    Eyes:  No difficulty with vision, no double vision, no dry eyes  ENT:  No sore throat, difficulty swallowing; changes in hearing or tinnitus  Cardiovascular: As detailed above  Respiratory:  No cough  Musculoskeletal  No joint pain, muscle aches  Neurologic:  No syncope, lightheadedness, fainting spells   Hematologic: No easy bruising, excessive bleeding tendency   Gastrointestinal:  No jaundice, abdominal pain or " abdominal bloating  Genitourinary: No changes in urinary habits, no trouble urinating    Psychiatric: No anxiety or depression      Medical History  Surgical History   Past Medical History:   Diagnosis Date     Anti-phospholipid antibody syndrome (H)      Atrial fibrillation (H)      Dysmenorrhea      Hypertension     with second full term pregnancy     Obese      Thyroid disease     hypothyroid    Past Surgical History:   Procedure Laterality Date     DILATION AND CURETTAGE N/A 2018    Procedure: SUCTION DILATION AND CURETTAGE;  Surgeon: Roxnana Moody MD;  Location: [facility];  Service:      DILATION AND CURETTAGE, OPERATIVE HYSTEROSCOPY WITH MORCELLATOR, COMBINED N/A 2019    Procedure: HYSTEROSCOPY, POLYPECTOMY, POSSIBLE DILATION AND CURETTAGE (Endosee MORCELLATOR AND FLUID MANAGEMENT);  Surgeon: Katia Rm MD;  Location:  OR     GYN SURGERY      D & C     TOE SURGERY           Family History Social History   Family History   Problem Relation Age of Onset     Bipolar Disorder Other      Depression Other      Alcoholism Other      Attention Deficit Disorder Other         Social History     Tobacco Use     Smoking status: Former     Types: Cigarettes     Quit date: 2018     Years since quittin.9     Smokeless tobacco: Never   Substance Use Topics     Alcohol use: Not Currently     Comment: 2/month     Drug use: Never         Medications  Allergies     Current Outpatient Medications:      acetaminophen (TYLENOL) 325 MG tablet, Take 2 tablets (650 mg) by mouth every 4 hours as needed for mild pain or fever (Patient not taking: Reported on 2022), Disp: 50 tablet, Rfl: 0     aspirin 81 MG EC tablet, Take 81 mg by mouth daily, Disp: , Rfl:      ibuprofen (ADVIL/MOTRIN) 600 MG tablet, Take 1 tablet (600 mg) by mouth every 6 hours as needed (Patient not taking: Reported on 2022), Disp: 60 tablet, Rfl: 1     levothyroxine (SYNTHROID/LEVOTHROID) 125 MCG tablet, TAKE 1  TABLET BY MOUTH DAILY AS DIRECTED (Patient not taking: Reported on 12/28/2022), Disp: , Rfl:      levothyroxine (SYNTHROID/LEVOTHROID) 50 MCG tablet, Take 50 mcg by mouth daily, Disp: , Rfl:      Prenatal Vit-Fe Fumarate-FA (PRENATAL PO), Take 1 tablet by mouth daily, Disp: , Rfl:      verapamil ER (CALAN-SR) 120 MG CR tablet, Take 1 tablet (120 mg) by mouth At Bedtime, Disp: 90 tablet, Rfl: 3     Vitamin D3 (CHOLECALCIFEROL) 125 MCG (5000 UT) tablet, Take 1 tablet by mouth daily, Disp: , Rfl:    No Known Allergies       Lab Results    Chemistry CBC Cardiac Enzymes/BNP/TSH/INR   Recent Labs   Lab Test 12/18/22  0824      POTASSIUM 4.6   CHLORIDE 110*   CO2 20*      BUN 14   CR 0.77   GFRESTIMATED >90   BRIEN 9.3     Recent Labs   Lab Test 12/18/22  0824 07/15/22  0219 04/19/22  1117   CR 0.77 0.97 0.61  0.62          Recent Labs   Lab Test 12/18/22  0824   WBC 9.2   HGB 14.2   HCT 41.9   MCV 87        Recent Labs   Lab Test 12/18/22  0824 07/15/22  0219 04/21/22  0030   HGB 14.2 13.7 11.4*    Recent Labs   Lab Test 07/15/22  0219   TROPONINI <0.01     No results for input(s): BNP, NTBNPI, NTBNP in the last 45633 hours.  Recent Labs   Lab Test 12/18/22  0824   TSH 5.16*     Recent Labs   Lab Test 08/12/20  1106   INR 0.99         Data Review    ECGs (tracings independently reviewed)  12/18/2022 - SR 76bpm  12/18/2022 - AF, ventricular rate 157bpm  7/15/2022 - AF, ventricular rate 150bpm    Cardiac event monitoring from 7/25/2022 to 8/3/2022 (monitored duration 3d 22h 38m) (independently reviewed)  Baseline rhythm was sinus rhythm 78bpm.    Reported heart rate range 50 to 120bpm, average 74bpm.  2 symptom triggers (other, palpitations) correlated to sinus rhythm 69-87bpm.  No nonsustained or sustained tachyarrhythmias.  No atrial fibrillation.  There were no pauses noted.  Supraventricular and ventricular ectopic beat frequency are not reported on this monitoring modality.      6/5/2021  TTE  Visually Estimated EF: 60-65%   Normal LV size and systolic function.   Normal RV size and systolic function.   No significant valvular heart disease noted.        45 minutes was spent reviewing the chart and in direct discussion with the patient.    Cc: Saran Vargas MD, Katia Rm MD Amila Dilusha William, MD  1/10/2023  4:36 PM

## 2023-01-11 ENCOUNTER — TELEPHONE (OUTPATIENT)
Dept: CARDIOLOGY | Facility: CLINIC | Age: 38
End: 2023-01-11

## 2023-01-11 ENCOUNTER — ANCILLARY PROCEDURE (OUTPATIENT)
Dept: GENERAL RADIOLOGY | Facility: CLINIC | Age: 38
End: 2023-01-11
Attending: PODIATRIST
Payer: COMMERCIAL

## 2023-01-11 ENCOUNTER — PREP FOR PROCEDURE (OUTPATIENT)
Dept: PODIATRY | Facility: CLINIC | Age: 38
End: 2023-01-11

## 2023-01-11 ENCOUNTER — TELEPHONE (OUTPATIENT)
Dept: PODIATRY | Facility: CLINIC | Age: 38
End: 2023-01-11

## 2023-01-11 ENCOUNTER — OFFICE VISIT (OUTPATIENT)
Dept: PODIATRY | Facility: CLINIC | Age: 38
End: 2023-01-11
Payer: COMMERCIAL

## 2023-01-11 VITALS — WEIGHT: 270 LBS | OXYGEN SATURATION: 97 % | HEART RATE: 88 BPM | HEIGHT: 64 IN | BODY MASS INDEX: 46.1 KG/M2

## 2023-01-11 DIAGNOSIS — M77.32 BONE SPUR OF POSTERIOR PORTION OF LEFT CALCANEUS: ICD-10-CM

## 2023-01-11 DIAGNOSIS — M79.672 LEFT FOOT PAIN: Primary | ICD-10-CM

## 2023-01-11 DIAGNOSIS — M77.32 CALCANEAL SPUR OF LEFT FOOT: ICD-10-CM

## 2023-01-11 DIAGNOSIS — M79.672 LEFT FOOT PAIN: ICD-10-CM

## 2023-01-11 DIAGNOSIS — M77.32 BONE SPUR OF POSTERIOR PORTION OF LEFT CALCANEUS: Primary | ICD-10-CM

## 2023-01-11 DIAGNOSIS — M77.52 RETROCALCANEAL BURSITIS (BACK OF HEEL), LEFT: ICD-10-CM

## 2023-01-11 PROCEDURE — 99213 OFFICE O/P EST LOW 20 MIN: CPT | Performed by: PODIATRIST

## 2023-01-11 PROCEDURE — 73630 X-RAY EXAM OF FOOT: CPT | Mod: LT | Performed by: PODIATRIST

## 2023-01-11 RX ORDER — CEFAZOLIN SODIUM/WATER 3 G/30 ML
3 SYRINGE (ML) INTRAVENOUS
Status: CANCELLED | OUTPATIENT
Start: 2023-02-13

## 2023-01-11 ASSESSMENT — PAIN SCALES - GENERAL: PAINLEVEL: MODERATE PAIN (5)

## 2023-01-11 NOTE — TELEPHONE ENCOUNTER
Spoke to pt regarding process of monitor, she states understanding and will wait for it in the mail.     No further questions or concerns at this time.    Luanne

## 2023-01-11 NOTE — TELEPHONE ENCOUNTER
Unable to book an OR at [facility] until blocks are removed. Call scheduling back on 01/30/23 to schedule.

## 2023-01-11 NOTE — PATIENT INSTRUCTIONS
What are Prescription Custom Orthotics?  Custom orthotics are specially-made devices designed to support and comfort your feet. Prescription orthotics are crafted for you and no one else. They match the contours of your feet precisely and are designed for the way you move. Orthotics are only manufactured after a podiatrist has conducted a complete evaluation of your feet, ankles, and legs, so the orthotic can accommodate your unique foot structure and pathology.  Prescription orthotics are divided into two categories:  Functional orthotics are designed to control abnormal motion. They may be used to treat foot pain caused by abnormal motion; they can also be used to treat injuries such as shin splints or tendinitis. Functional orthotics are usually crafted of a semi-rigid material such as plastic or graphite.  Accommodative orthotics are softer and meant to provide additional cushioning and support. They can be used to treat diabetic foot ulcers, painful calluses on the bottom of the foot, and other uncomfortable conditions.  Podiatrists use orthotics to treat foot problems such as plantar fasciitis, bursitis, tendinitis, diabetic foot ulcers, and foot, ankle, and heel pain. Clinical research studies have shown that podiatrist-prescribed foot orthotics decrease foot pain and improve function.  Orthotics typically cost more than shoe inserts purchased in a retail store, but the additional cost is usually well worth it. Unlike shoe inserts, orthotics are molded to fit each individual foot, so you can be sure that your orthotics fit and do what they're supposed to do. Prescription orthotics are also made of top-notch materials and last many years when cared for properly. Insurance often helps pay for prescription orthotics.  What are Shoe Inserts?   You've seen them at the grocery store and at the mall. You've probably even seen them on TV and online. Shoe inserts are any kind of non-prescription foot support designed  to be worn inside a shoe. Pre-packaged, mass produced, arch supports are shoe inserts. So are the  custom-made  insoles and foot supports that you can order online or at retail stores. Unless the device has been prescribed by a doctor and crafted for your specific foot, it's a shoe insert, not a custom orthotic device--despite what the ads might say.  Shoe inserts can be very helpful for a variety of foot ailments, including flat arches and foot and leg pain. They can cushion your feet, provide comfort, and support your arches, but they can't correct biomechanical foot problems or cure long-standing foot issues.  The most common types of shoe inserts are:  Arch supports: Some people have high arches. Others have low arches or flat feet. Arch supports generally have a  bumped-up  appearance and are designed to support the foot's natural arch.   Insoles: Insoles slip into your shoe to provide extra cushioning and support. Insoles are often made of gel, foam, or plastic.   Heel liners: Heel liners, sometimes called heel pads or heel cups, provide extra cushioning in the heel region. They may be especially useful for patients who have foot pain caused by age-related thinning of the heels' natural fat pads.   Foot cushions: Do your shoes rub against your heel or your toes? Foot cushions come in many different shapes and sizes and can be used as a barrier between you and your shoe.  Choosing an Over-the-Counter Shoe Insert  Selecting a shoe insert from the wide variety of devices on the market can be overwhelming. Here are some podiatrist-tested tips to help you find the insert that best meets your needs:  Consider your health. Do you have diabetes? Problems with circulation? An over-the-counter insert may not be your best bet. Diabetes and poor circulation increase your risk of foot ulcers and infections, so schedule an appointment with a podiatrist. He or she can help you select a solution that won't cause additional  health problems.   Think about the purpose. Are you planning to run a marathon, or do you just need a little arch support in your work shoes? Look for a product that fits your planned level of activity.   Bring your shoes. For the insert to be effective, it has to fit into your shoes. So bring your sneakers, dress shoes, or work boots--whatever you plan to wear with your insert. Look for an insert that will fit the contours of your shoe.   Try them on. If all possible, slip the insert into your shoe and try it out. Walk around a little. How does it feel? Don't assume that feelings of pressure will go away with continued wear. (If you can't try the inserts at the store, ask about the store's return policy and hold on to your receipt.)    Please call one of the [place] locations below to schedule an appointment. If you received a prescription please bring it with you to your appointment. Some locations are limited to what they carry.    Office Locations    [facility]  [address] [place] [address]  Home Medical Equipment, [address]   Phone: [phone]   Orthotics and Prosthetics, [address]   Phone: [phone]    [facility]  [address]  [address]   [place] [address]   Phone: [phone]    [facility]  [address]  [place] [address]  Phone: [phone]    [facility]   [address]  [place] [address]  Phone: [phone]    [facility]  [address]  [place] [address]  Phone: [phone]    [facility]  [address]  [place] [address]  Phone: [phone]    [place]   [address] [place] [address]   Phone: [phone]    WEARING YOUR CUSTOM FOOT ORTHOTICS   Most  insurance plans cover one pair of orthotics per year. You must check with your   insurance plan to see what your payment responsibility will be. Please call your   insurance company by calling the number on the back of your insurance card.   Orthotic's are non-refundable and non-returnable.   Orthotics are made of various designs. Some orthotics are covered with material that extends beyond your toes. If your orthotic is of this design, you will likely need to trim the toe end to get a proper fit. The insole from your shoe can be used as a template. Simply overlay the shoe insert on top of the custom orthotic. Align the heel end while tracing the length of the insert onto the custom orthotic. Use a large scissor to trim the toe end until you get a proper fit in the shoe.   The orthotic needs to be pushed as far back in the shoe as possible. The heel portion should not ride forward so as not to irritate your heel.   Orthotics are designed to work with socks. Excessive perspiration will shorten the life span of the orthotics. Remove the orthotic from the shoe frequently for proper drying.   The break-in period lasts for weeks. People new to orthotics will likely experience new aches and pains. The orthotic is forcing your foot into a new position. Arch, foot and leg muscle aches and fatigue are common during these weeks. Minor discomfort can be considered normal break in phenomenon. Start wearing your orthotic around your home your first day. Limited activity for one to two hours is recommended. You can increase one or two additional hours each day provided the aches and pains are subsiding. The degree of discomfort, fatigue and problems will dictate the speed of break in. You may require multiple weeks to work up to full time use.   Do not continue wearing your orthotics if they are creating problems such as blisters or sores. Do not hesitate to call the clinic to speak with a nurse regarding orthotic   break in,  fit, trimming, etc. You may also need to see the doctor if the orthotics are   simply not working out. Adjustments are sometimes made to improve orthotic   function.     Orthotics will only work in certain styles and types of shoes. Orthotics rarely work in dress shoes. Slip-ons, clogs, sandals and heels are particularly troublesome. Specially designed orthotics may be necessary for these types of shoes. Your custom orthotic was designed for activities that require appropriate walking or running shoes. Lace up athletic shoes, walking shoes or work boots should work appropriately. You may need a wider or longer shoe. Shoes with a removable  or insert work best. In general, you want to remove an insert from the shoe before placing the orthotic into the shoe. Shoes without a removable liner may not work as well.     When purchasing new shoes, bring your orthotics along to get a proper fit. Shop at stores that are familiar with orthotics.   Frequent washing of the orthotic may shorten the life span of the top cover. The top cover can be replaced but will generally last one to five years depending on use and foot perspiration.  WHAT YOU NEED TO KNOW:    What is a walking boot?  A walking boot is a type of medical shoe used to protect the foot and ankle after an injury or surgery. The boot can be used for broken bones, tendon injuries, severe sprains, or shin splints. A walking boot helps keep the foot stable so it can heal. It can keep your weight off an area, such as your toe, as it heals. Most boots have between 2 and 5 adjustable straps and go mid-way up your calf.              How do I put on the walking boot?  You may want to wear a large sock.  Sit down and place your heel all the way to the back of the boot.  Wrap the soft liner around your foot and leg.  Place the front piece over the liner.  Start to fasten the straps closest to your toes then move up your leg.  Tighten the straps so they are snug but  not too tight. The boot should limit movement but not cut off your blood flow.  If your boot has one or more air chambers, pump them up as directed by your healthcare provider.  Stand up and take a few steps to practice walking.    What else do I need to know?  Check your foot and toes often. Check your foot and toes for redness and swelling. If your toes are red, swollen, numb, or tingly, loosen your straps or deflate the air chamber. Over time, the swelling from the injury or surgery will decrease. When this happens, you may need to tighten the straps.  Be careful when you walk on wet surfaces. The boot may be slippery.  Follow the instructions to wash the liner. Remove the liner and wash it by hand in cold water with a mild detergent. Do not use a washing machine or dryer. Place the liner flat to dry. Wash the plastic parts with a damp cloth and mild soap.  Ask about removing the boot to bathe or for motion exercises. You may need to leave the boot on when you bathe. Cover it with a plastic bag and tape the bag closed around your leg.

## 2023-01-11 NOTE — TELEPHONE ENCOUNTER
Health Call Center    Phone Message    May a detailed message be left on voicemail: yes     Reason for Call: Order(s): Other:   Reason for requested: Monitor  Date needed: 1/11/23  Provider name: White    Patient called to schedule an appt for a monitor. Please call patient back to further discuss and coordinate, thank you.      Action Taken: Message routed to:  Other: Cardiology    Travel Screening: Not Applicable     Thank you!  [facility]

## 2023-01-11 NOTE — PROGRESS NOTES
FOOT AND ANKLE SURGERY/PODIATRY CONSULT NOTE         ASSESSMENT:   Left heel  pain  Taisha's deformity left foot  Retrocalcaneal spur left foot  Retrocalcaneal bursitis  Plantar calcaneal spur left foot    TREATMENT:  An x-ray of the left foot were taken today.  The x-rays were read and interpreted by this physician today.  I informed the patient she does have a hyperostosis at the posterior superior aspect of the calcaneus.  I am going to recommend the patient be placed in a cam boot for the next 4 weeks in an attempt to alleviate her symptoms.  The patient is currently breast-feeding.  I did not recommend any corticosteroids.  I informed patient that if her pain persist she would require surgical intervention.  She will require excision of the retrocalcaneal spur as well as an exostectomy of the posterior superior aspect of the calcaneus.  I have also recommended orthotics.  The patient is to return to the clinic as needed.     HPI: Cherie Montgomery presented to the clinic today complaining of severe pain in the back of her left heel.  The patient indicated that she has had this heel pain for approximately 2 years.  She describes it as a aching type pain which is aggravated with weightbearing and ambulation.  She has tried icing and stretching in the past with no relief.  She denies any trauma to her foot.  She has not had any associated redness or swelling.  Most of her pain is relieved with nonweightbearing but she does continue to have some mild discomfort while resting.    Past Medical History:   Diagnosis Date     Anti-phospholipid antibody syndrome (H)      Atrial fibrillation (H)      Dysmenorrhea      Hypertension     with second full term pregnancy     Obese      Thyroid disease     hypothyroid       Social History     Socioeconomic History     Marital status:      Spouse name: Not on file     Number of children: Not on file     Years of education: Not on file     Highest education level: Not on  file   Occupational History     Not on file   Tobacco Use     Smoking status: Former     Types: Cigarettes     Quit date: 2018     Years since quittin.9     Smokeless tobacco: Never   Substance and Sexual Activity     Alcohol use: Not Currently     Comment: 2/month     Drug use: Never     Sexual activity: Yes     Partners: Male   Other Topics Concern     Parent/sibling w/ CABG, MI or angioplasty before 65F 55M? Not Asked   Social History Narrative     Not on file     Social Determinants of Health     Financial Resource Strain: Not on file   Food Insecurity: Not on file   Transportation Needs: Not on file   Physical Activity: Not on file   Stress: Not on file   Social Connections: Not on file   Intimate Partner Violence: Not on file   Housing Stability: Not on file        No Known Allergies       Current Outpatient Medications:      acetaminophen (TYLENOL) 325 MG tablet, Take 2 tablets (650 mg) by mouth every 4 hours as needed for mild pain or fever (Patient not taking: Reported on 1/10/2023), Disp: 50 tablet, Rfl: 0     aspirin 81 MG EC tablet, Take 81 mg by mouth daily, Disp: , Rfl:      ibuprofen (ADVIL/MOTRIN) 600 MG tablet, Take 1 tablet (600 mg) by mouth every 6 hours as needed (Patient not taking: Reported on 2022), Disp: 60 tablet, Rfl: 1     levothyroxine (SYNTHROID/LEVOTHROID) 50 MCG tablet, Take 50 mcg by mouth daily, Disp: , Rfl:      Prenatal Vit-Fe Fumarate-FA (PRENATAL PO), Take 1 tablet by mouth daily, Disp: , Rfl:      verapamil ER (CALAN-SR) 120 MG CR tablet, Take 1 tablet (120 mg) by mouth At Bedtime, Disp: 90 tablet, Rfl: 3     Vitamin D3 (CHOLECALCIFEROL) 125 MCG (5000 UT) tablet, Take 1 tablet by mouth daily, Disp: , Rfl:      Family History   Problem Relation Age of Onset     Bipolar Disorder Other      Depression Other      Alcoholism Other      Attention Deficit Disorder Other         Social History     Socioeconomic History     Marital status:      Spouse name: Not on  file     Number of children: Not on file     Years of education: Not on file     Highest education level: Not on file   Occupational History     Not on file   Tobacco Use     Smoking status: Former     Types: Cigarettes     Quit date: 2018     Years since quittin.9     Smokeless tobacco: Never   Substance and Sexual Activity     Alcohol use: Not Currently     Comment: 2/month     Drug use: Never     Sexual activity: Yes     Partners: Male   Other Topics Concern     Parent/sibling w/ CABG, MI or angioplasty before 65F 55M? Not Asked   Social History Narrative     Not on file     Social Determinants of Health     Financial Resource Strain: Not on file   Food Insecurity: Not on file   Transportation Needs: Not on file   Physical Activity: Not on file   Stress: Not on file   Social Connections: Not on file   Intimate Partner Violence: Not on file   Housing Stability: Not on file        Review of Systems - Patient denies fever, chills, rash, wound, stiffness, limping, numbness, weakness, heart burn, blood in stool, chest pain with activity, calf pain when walking, shortness of breath with activity, chronic cough, easy bleeding/bruising, swelling of ankles, excessive thirst, fatigue, depression, anxiety.  Patient admits to left heel pain.      OBJECTIVE:  Appearance: alert, well appearing, and in no distress.    There were no vitals taken for this visit.     There is no height or weight on file to calculate BMI.     General appearance: Patient is alert and fully cooperative with history & exam.  No sign of distress is noted during the visit.  Psychiatric: Affect is pleasant & appropriate.  Patient appears motivated to improve health.  Respiratory: Breathing is regular & unlabored while sitting.  HEENT: Hearing is intact to spoken word.  Speech is clear.  No gross evidence of visual impairment that would impact ambulation.    Vascular: Dorsalis pedis and posterior tibial pulses are palpable. There is pedal hair  growth bilaterally.  CFT < 3 sec from anterior tibial surface to distal digits bilaterally. There is no appreciable edema noted.  Dermatologic: Turgor and texture are within normal limits. No coloration or temperature changes. No primary or secondary lesions noted.  Neurologic: All epicritic and proprioceptive sensations are grossly intact bilaterally.  Musculoskeletal: All active and passive ankle, subtalar, midtarsal, and 1st MPJ range of motion are grossly intact without pain or crepitus, with the exception of none. Manual muscle strength is within normal limits bilaterally. All dorsiflexors, plantarflexors, invertors, evertors are intact bilaterally. Ttenderness present to the posterior aspect of the left heel on palpation.  Mild tenderness to the posterior aspect of the left heel with range of motion. Calf is soft/non-tender without warmth/induration    Imaging:       No images are attached to the encounter or orders placed in the encounter.     No results found.   No results found.       Jomar Sosa DPM  [facility]/Podiatry

## 2023-01-11 NOTE — TELEPHONE ENCOUNTER
She decided that she would like to proceed with surgery. Please enter order for surgery. I will schedule her on 02/13/23 and it will need to be at [facility]. AFIB and BMI over 45.

## 2023-01-11 NOTE — LETTER
1/11/2023         RE: Cherie Montgomery  [address] [place] [id number]        Dear Colleague,    Thank you for referring your patient, Cherie Montgomery, to the [facility]. Please see a copy of my visit note below.    FOOT AND ANKLE SURGERY/PODIATRY CONSULT NOTE         ASSESSMENT:   Left heel  pain  Taisha's deformity left foot  Retrocalcaneal spur left foot  Retrocalcaneal bursitis  Plantar calcaneal spur left foot    TREATMENT:  An x-ray of the left foot were taken today.  The x-rays were read and interpreted by this physician today.  I informed the patient she does have a hyperostosis at the posterior superior aspect of the calcaneus.  I am going to recommend the patient be placed in a cam boot for the next 4 weeks in an attempt to alleviate her symptoms.  The patient is currently breast-feeding.  I did not recommend any corticosteroids.  I informed patient that if her pain persist she would require surgical intervention.  She will require excision of the retrocalcaneal spur as well as an exostectomy of the posterior superior aspect of the calcaneus.  I have also recommended orthotics.  The patient is to return to the clinic as needed.     HPI: Cherie Montgomery presented to the clinic today complaining of severe pain in the back of her left heel.  The patient indicated that she has had this heel pain for approximately 2 years.  She describes it as a aching type pain which is aggravated with weightbearing and ambulation.  She has tried icing and stretching in the past with no relief.  She denies any trauma to her foot.  She has not had any associated redness or swelling.  Most of her pain is relieved with nonweightbearing but she does continue to have some mild discomfort while resting.    Past Medical History:   Diagnosis Date     Anti-phospholipid antibody syndrome (H)      Atrial fibrillation (H)      Dysmenorrhea      Hypertension     with second full term  pregnancy     Obese      Thyroid disease     hypothyroid       Social History     Socioeconomic History     Marital status:      Spouse name: Not on file     Number of children: Not on file     Years of education: Not on file     Highest education level: Not on file   Occupational History     Not on file   Tobacco Use     Smoking status: Former     Types: Cigarettes     Quit date: 2018     Years since quittin.9     Smokeless tobacco: Never   Substance and Sexual Activity     Alcohol use: Not Currently     Comment: 2/month     Drug use: Never     Sexual activity: Yes     Partners: Male   Other Topics Concern     Parent/sibling w/ CABG, MI or angioplasty before 65F 55M? Not Asked   Social History Narrative     Not on file     Social Determinants of Health     Financial Resource Strain: Not on file   Food Insecurity: Not on file   Transportation Needs: Not on file   Physical Activity: Not on file   Stress: Not on file   Social Connections: Not on file   Intimate Partner Violence: Not on file   Housing Stability: Not on file        No Known Allergies       Current Outpatient Medications:      acetaminophen (TYLENOL) 325 MG tablet, Take 2 tablets (650 mg) by mouth every 4 hours as needed for mild pain or fever (Patient not taking: Reported on 1/10/2023), Disp: 50 tablet, Rfl: 0     aspirin 81 MG EC tablet, Take 81 mg by mouth daily, Disp: , Rfl:      ibuprofen (ADVIL/MOTRIN) 600 MG tablet, Take 1 tablet (600 mg) by mouth every 6 hours as needed (Patient not taking: Reported on 2022), Disp: 60 tablet, Rfl: 1     levothyroxine (SYNTHROID/LEVOTHROID) 50 MCG tablet, Take 50 mcg by mouth daily, Disp: , Rfl:      Prenatal Vit-Fe Fumarate-FA (PRENATAL PO), Take 1 tablet by mouth daily, Disp: , Rfl:      verapamil ER (CALAN-SR) 120 MG CR tablet, Take 1 tablet (120 mg) by mouth At Bedtime, Disp: 90 tablet, Rfl: 3     Vitamin D3 (CHOLECALCIFEROL) 125 MCG (5000 UT) tablet, Take 1 tablet by mouth daily, Disp: ,  Rfl:      Family History   Problem Relation Age of Onset     Bipolar Disorder Other      Depression Other      Alcoholism Other      Attention Deficit Disorder Other         Social History     Socioeconomic History     Marital status:      Spouse name: Not on file     Number of children: Not on file     Years of education: Not on file     Highest education level: Not on file   Occupational History     Not on file   Tobacco Use     Smoking status: Former     Types: Cigarettes     Quit date: 2018     Years since quittin.9     Smokeless tobacco: Never   Substance and Sexual Activity     Alcohol use: Not Currently     Comment: 2/month     Drug use: Never     Sexual activity: Yes     Partners: Male   Other Topics Concern     Parent/sibling w/ CABG, MI or angioplasty before 65F 55M? Not Asked   Social History Narrative     Not on file     Social Determinants of Health     Financial Resource Strain: Not on file   Food Insecurity: Not on file   Transportation Needs: Not on file   Physical Activity: Not on file   Stress: Not on file   Social Connections: Not on file   Intimate Partner Violence: Not on file   Housing Stability: Not on file        Review of Systems - Patient denies fever, chills, rash, wound, stiffness, limping, numbness, weakness, heart burn, blood in stool, chest pain with activity, calf pain when walking, shortness of breath with activity, chronic cough, easy bleeding/bruising, swelling of ankles, excessive thirst, fatigue, depression, anxiety.  Patient admits to left heel pain.      OBJECTIVE:  Appearance: alert, well appearing, and in no distress.    There were no vitals taken for this visit.     There is no height or weight on file to calculate BMI.     General appearance: Patient is alert and fully cooperative with history & exam.  No sign of distress is noted during the visit.  Psychiatric: Affect is pleasant & appropriate.  Patient appears motivated to improve health.  Respiratory:  Breathing is regular & unlabored while sitting.  HEENT: Hearing is intact to spoken word.  Speech is clear.  No gross evidence of visual impairment that would impact ambulation.    Vascular: Dorsalis pedis and posterior tibial pulses are palpable. There is pedal hair growth bilaterally.  CFT < 3 sec from anterior tibial surface to distal digits bilaterally. There is no appreciable edema noted.  Dermatologic: Turgor and texture are within normal limits. No coloration or temperature changes. No primary or secondary lesions noted.  Neurologic: All epicritic and proprioceptive sensations are grossly intact bilaterally.  Musculoskeletal: All active and passive ankle, subtalar, midtarsal, and 1st MPJ range of motion are grossly intact without pain or crepitus, with the exception of none. Manual muscle strength is within normal limits bilaterally. All dorsiflexors, plantarflexors, invertors, evertors are intact bilaterally. Ttenderness present to the posterior aspect of the left heel on palpation.  Mild tenderness to the posterior aspect of the left heel with range of motion. Calf is soft/non-tender without warmth/induration    Imaging:       No images are attached to the encounter or orders placed in the encounter.     No results found.   No results found.       Jomar Sosa DPM  [facility] Foot & Ankle Surgery/Podiatry         Again, thank you for allowing me to participate in the care of your patient.        Sincerely,        Jomar Painter DPM

## 2023-01-12 NOTE — TELEPHONE ENCOUNTER
Message left informing patient that I will follow up with her on 01/30/23 to confirm her surgery at [facility] on 02/13/23.  Added to [place].

## 2023-01-13 ENCOUNTER — TELEPHONE (OUTPATIENT)
Dept: CARDIOLOGY | Facility: CLINIC | Age: 38
End: 2023-01-13

## 2023-01-13 DIAGNOSIS — I48.0 PAROXYSMAL ATRIAL FIBRILLATION (H): Primary | ICD-10-CM

## 2023-01-13 NOTE — TELEPHONE ENCOUNTER
Called and spoke with patient, she is having foot surgery and cannot bear weight.    Dr. Lowe, ok to switch stress test to Nuclear med?    We discussed her AF episode, it was non sustained and she is currently feeling well.  Anxious to move forward with testing.    Thank you!  [place]

## 2023-01-13 NOTE — TELEPHONE ENCOUNTER
[facility] Call Center    Phone Message    May a detailed message be left on voicemail: yes     Reason for Call: Other: Pt called inbecause she has a exercise stress test scheduled and she cannot do that test she is having foot surgery and will not be able to be on treadmill.She is hoping there is another way that she can do this test. She also had another Afib episode last night that seemed to resolve itself but she is concerned about that as well. Please reach out to the pt to discuss further.     Action Taken: Other: cardio    Travel Screening: Not Applicable     Thank you!  [facility]

## 2023-01-13 NOTE — TELEPHONE ENCOUNTER
Shira Lowe MD Westlund, Jessica, RN  Caller: Unspecified (Today, 10:30 AM)  Hi - yes, certainly can switch to pharmacologic MPI.     Thanks,   Sam

## 2023-01-23 ENCOUNTER — TELEPHONE (OUTPATIENT)
Dept: CARDIOLOGY | Facility: CLINIC | Age: 38
End: 2023-01-23
Payer: COMMERCIAL

## 2023-01-24 ENCOUNTER — HOSPITAL ENCOUNTER (OUTPATIENT)
Dept: NUCLEAR MEDICINE | Facility: CLINIC | Age: 38
Discharge: HOME | End: 2023-01-24
Attending: INTERNAL MEDICINE
Payer: COMMERCIAL

## 2023-01-24 ENCOUNTER — HOSPITAL ENCOUNTER (OUTPATIENT)
Dept: CARDIOLOGY | Facility: CLINIC | Age: 38
Discharge: HOME | End: 2023-01-24
Attending: INTERNAL MEDICINE
Payer: COMMERCIAL

## 2023-01-24 DIAGNOSIS — I48.0 PAROXYSMAL ATRIAL FIBRILLATION (H): ICD-10-CM

## 2023-01-24 LAB
CV STRESS CURRENT BP HE: NORMAL
CV STRESS CURRENT HR HE: 102
CV STRESS CURRENT HR HE: 102
CV STRESS CURRENT HR HE: 74
CV STRESS CURRENT HR HE: 76
CV STRESS CURRENT HR HE: 78
CV STRESS CURRENT HR HE: 85
CV STRESS CURRENT HR HE: 87
CV STRESS CURRENT HR HE: 88
CV STRESS CURRENT HR HE: 88
CV STRESS CURRENT HR HE: 89
CV STRESS CURRENT HR HE: 95
CV STRESS CURRENT HR HE: 95
CV STRESS CURRENT HR HE: 96
CV STRESS CURRENT HR HE: 98
CV STRESS DEVIATION TIME HE: NORMAL
CV STRESS ECHO PERCENT HR HE: NORMAL
CV STRESS EXERCISE STAGE HE: NORMAL
CV STRESS FINAL RESTING BP HE: NORMAL
CV STRESS FINAL RESTING HR HE: 88
CV STRESS MAX HR HE: 108
CV STRESS MAX TREADMILL GRADE HE: 0
CV STRESS MAX TREADMILL SPEED HE: 0
CV STRESS PEAK DIA BP HE: NORMAL
CV STRESS PEAK SYS BP HE: NORMAL
CV STRESS PHASE HE: NORMAL
CV STRESS PROTOCOL HE: NORMAL
CV STRESS RESTING PT POSITION HE: NORMAL
CV STRESS ST DEVIATION AMOUNT HE: NORMAL
CV STRESS ST DEVIATION ELEVATION HE: NORMAL
CV STRESS ST EVELATION AMOUNT HE: NORMAL
CV STRESS TEST TYPE HE: NORMAL
CV STRESS TOTAL STAGE TIME MIN 1 HE: NORMAL
NUC STRESS EJECTION FRACTION: 70 %
RATE PRESSURE PRODUCT: NORMAL
STRESS ECHO BASELINE DIASTOLIC HE: 83
STRESS ECHO BASELINE HR: 77
STRESS ECHO BASELINE SYSTOLIC BP: 123
STRESS ECHO CALCULATED PERCENT HR: 59 %
STRESS ECHO LAST STRESS DIASTOLIC BP: 85
STRESS ECHO LAST STRESS HR: 95
STRESS ECHO LAST STRESS SYSTOLIC BP: 114
STRESS ECHO TARGET HR: 184

## 2023-01-24 PROCEDURE — 78452 HT MUSCLE IMAGE SPECT MULT: CPT | Mod: 26 | Performed by: INTERNAL MEDICINE

## 2023-01-24 PROCEDURE — 93016 CV STRESS TEST SUPVJ ONLY: CPT | Performed by: INTERNAL MEDICINE

## 2023-01-24 PROCEDURE — 250N000011 HC RX IP 250 OP 636: Performed by: INTERNAL MEDICINE

## 2023-01-24 PROCEDURE — A9500 TC99M SESTAMIBI: HCPCS | Performed by: INTERNAL MEDICINE

## 2023-01-24 PROCEDURE — 93017 CV STRESS TEST TRACING ONLY: CPT

## 2023-01-24 PROCEDURE — 343N000001 HC RX 343: Performed by: INTERNAL MEDICINE

## 2023-01-24 PROCEDURE — 78452 HT MUSCLE IMAGE SPECT MULT: CPT

## 2023-01-24 PROCEDURE — 93018 CV STRESS TEST I&R ONLY: CPT | Performed by: INTERNAL MEDICINE

## 2023-01-24 RX ORDER — AMINOPHYLLINE 25 MG/ML
50 INJECTION, SOLUTION INTRAVENOUS
Status: DISCONTINUED | OUTPATIENT
Start: 2023-01-24 | End: 2023-01-24 | Stop reason: HOSPADM

## 2023-01-24 RX ORDER — ALBUTEROL SULFATE 0.83 MG/ML
2.5 SOLUTION RESPIRATORY (INHALATION)
Status: DISCONTINUED | OUTPATIENT
Start: 2023-01-24 | End: 2023-01-24 | Stop reason: HOSPADM

## 2023-01-24 RX ORDER — REGADENOSON 0.08 MG/ML
0.4 INJECTION, SOLUTION INTRAVENOUS ONCE
Status: COMPLETED | OUTPATIENT
Start: 2023-01-24 | End: 2023-01-24

## 2023-01-24 RX ORDER — CAFFEINE CITRATE 20 MG/ML
60 SOLUTION INTRAVENOUS
Status: DISCONTINUED | OUTPATIENT
Start: 2023-01-24 | End: 2023-01-24 | Stop reason: HOSPADM

## 2023-01-24 RX ORDER — CAFFEINE 200 MG
200 TABLET ORAL
Status: DISCONTINUED | OUTPATIENT
Start: 2023-01-24 | End: 2023-01-24 | Stop reason: HOSPADM

## 2023-01-24 RX ADMIN — REGADENOSON 0.4 MG: 0.08 INJECTION, SOLUTION INTRAVENOUS at 09:56

## 2023-01-24 RX ADMIN — Medication 7.55 MILLICURIE: at 09:00

## 2023-01-24 RX ADMIN — Medication 40.1 MILLICURIE: at 09:56

## 2023-01-31 NOTE — TELEPHONE ENCOUNTER
Surgery scheduled with Dr. Painter at [facility] on 02/13/23.  Excision retrocalcaneal spur left foot   CPT Code 06746    Confirmed with Honey.  Added to [place].    Pre-op physical scheduled 02/03/23.  Post ops scheduled.  Message left informing patient.

## 2023-02-01 ENCOUNTER — OFFICE VISIT (OUTPATIENT)
Dept: PODIATRY | Facility: CLINIC | Age: 38
End: 2023-02-01
Payer: COMMERCIAL

## 2023-02-01 VITALS — OXYGEN SATURATION: 90 % | BODY MASS INDEX: 46.1 KG/M2 | HEIGHT: 64 IN | HEART RATE: 67 BPM | WEIGHT: 270 LBS

## 2023-02-01 DIAGNOSIS — M77.32 BONE SPUR OF POSTERIOR PORTION OF LEFT CALCANEUS: Primary | ICD-10-CM

## 2023-02-01 DIAGNOSIS — M77.32 CALCANEAL SPUR OF LEFT FOOT: ICD-10-CM

## 2023-02-01 DIAGNOSIS — M77.52 RETROCALCANEAL BURSITIS (BACK OF HEEL), LEFT: ICD-10-CM

## 2023-02-01 DIAGNOSIS — R11.0 NAUSEA: ICD-10-CM

## 2023-02-01 DIAGNOSIS — M79.672 LEFT FOOT PAIN: ICD-10-CM

## 2023-02-01 PROCEDURE — 99214 OFFICE O/P EST MOD 30 MIN: CPT | Performed by: PODIATRIST

## 2023-02-01 ASSESSMENT — PAIN SCALES - GENERAL: PAINLEVEL: MODERATE PAIN (5)

## 2023-02-01 NOTE — PROGRESS NOTES
FOOT AND ANKLE SURGERY/PODIATRY CONSULT NOTE         ASSESSMENT:   Left heel  pain  Taisha's deformity left foot  Retrocalcaneal spur left foot  Retrocalcaneal bursitis  Plantar calcaneal spur left foot     TREATMENT:  I informed patient that I recommend surgical intervention.  She will require excision of the retrocalcaneal spur as well as an exostectomy of the posterior superior aspect of the calcaneus.  She was told that the procedure will be performed on an outpatient basis under general anesthesia with a popliteal block.  She was told the procedure takes approximately 45 minutes to perform.  She would then be discharged nonweightbearing for 1 month and partial weightbearing for 1 month in a cam boot.  The patient was asked to go n.p.o. at midnight prior to the procedure and she was asked to see her primary care physician for preoperative consultation.  All pertinent questions were invited and answered.      HPI: Cherie Montgomery presented to the clinic today with continued complaints of severe pain in the back of her left heel.  The patient indicated that she has had this heel pain for approximately 2 years.  She describes it as a aching type pain which is aggravated with weightbearing and ambulation.  She has tried icing and stretching in the past with no relief.  She denies any trauma to her foot.  She has not had any associated redness or swelling.  Most of her pain is relieved with nonweightbearing but she does continue to have some mild discomfort while resting.    Past Medical History:   Diagnosis Date     Anti-phospholipid antibody syndrome (H)      Atrial fibrillation (H)      Dysmenorrhea      Hypertension     with second full term pregnancy     Obese      Thyroid disease     hypothyroid       Social History     Socioeconomic History     Marital status:      Spouse name: Not on file     Number of children: Not on file     Years of education: Not on file     Highest education level: Not on file    Occupational History     Not on file   Tobacco Use     Smoking status: Former     Types: Cigarettes     Quit date: 2018     Years since quittin.0     Smokeless tobacco: Never   Substance and Sexual Activity     Alcohol use: Not Currently     Comment: 2/month     Drug use: Never     Sexual activity: Yes     Partners: Male   Other Topics Concern     Parent/sibling w/ CABG, MI or angioplasty before 65F 55M? Not Asked   Social History Narrative     Not on file     Social Determinants of Health     Financial Resource Strain: Not on file   Food Insecurity: Not on file   Transportation Needs: Not on file   Physical Activity: Not on file   Stress: Not on file   Social Connections: Not on file   Intimate Partner Violence: Not on file   Housing Stability: Not on file        No Known Allergies       Current Outpatient Medications:      aspirin 81 MG EC tablet, Take 81 mg by mouth daily, Disp: , Rfl:      levothyroxine (SYNTHROID/LEVOTHROID) 50 MCG tablet, Take 50 mcg by mouth daily, Disp: , Rfl:      Prenatal Vit-Fe Fumarate-FA (PRENATAL PO), Take 1 tablet by mouth daily, Disp: , Rfl:      verapamil ER (CALAN-SR) 120 MG CR tablet, Take 1 tablet (120 mg) by mouth At Bedtime, Disp: 90 tablet, Rfl: 3     Vitamin D3 (CHOLECALCIFEROL) 125 MCG (5000 UT) tablet, Take 1 tablet by mouth daily, Disp: , Rfl:      Family History   Problem Relation Age of Onset     Bipolar Disorder Other      Depression Other      Alcoholism Other      Attention Deficit Disorder Other         Social History     Socioeconomic History     Marital status:      Spouse name: Not on file     Number of children: Not on file     Years of education: Not on file     Highest education level: Not on file   Occupational History     Not on file   Tobacco Use     Smoking status: Former     Types: Cigarettes     Quit date: 2018     Years since quittin.0     Smokeless tobacco: Never   Substance and Sexual Activity     Alcohol use: Not Currently  "    Comment: 2/month     Drug use: Never     Sexual activity: Yes     Partners: Male   Other Topics Concern     Parent/sibling w/ CABG, MI or angioplasty before 65F 55M? Not Asked   Social History Narrative     Not on file     Social Determinants of Health     Financial Resource Strain: Not on file   Food Insecurity: Not on file   Transportation Needs: Not on file   Physical Activity: Not on file   Stress: Not on file   Social Connections: Not on file   Intimate Partner Violence: Not on file   Housing Stability: Not on file        Review of Systems - Patient denies fever, chills, rash, wound, stiffness, limping, numbness, weakness, heart burn, blood in stool, chest pain with activity, calf pain when walking, shortness of breath with activity, chronic cough, easy bleeding/bruising, swelling of ankles, excessive thirst, fatigue, depression, anxiety.  Patient admits to left heel pain.      OBJECTIVE:  Appearance: alert, well appearing, and in no distress.    Pulse 67   Ht 1.626 m (5' 4\")   Wt 122.5 kg (270 lb)   SpO2 90%   BMI 46.35 kg/m       Body mass index is 46.35 kg/m .     General appearance: Patient is alert and fully cooperative with history & exam.  No sign of distress is noted during the visit.  Psychiatric: Affect is pleasant & appropriate.  Patient appears motivated to improve health.  Respiratory: Breathing is regular & unlabored while sitting.  HEENT: Hearing is intact to spoken word.  Speech is clear.  No gross evidence of visual impairment that would impact ambulation.    Vascular: Dorsalis pedis and posterior tibial pulses are palpable. There is pedal hair growth bilaterally.  CFT < 3 sec from anterior tibial surface to distal digits bilaterally. There is no appreciable edema noted.  Dermatologic: Turgor and texture are within normal limits. No coloration or temperature changes. No primary or secondary lesions noted.  Neurologic: All epicritic and proprioceptive sensations are grossly intact " bilaterally.  Musculoskeletal: All active and passive ankle, subtalar, midtarsal, and 1st MPJ range of motion are grossly intact without pain or crepitus, with the exception of none. Manual muscle strength is within normal limits bilaterally. All dorsiflexors, plantarflexors, invertors, evertors are intact bilaterally. Ttenderness present to the posterior aspect of the left heel on palpation.  Mild tenderness to the posterior aspect of the left heel with range of motion. Calf is soft/non-tender without warmth/induration    Imaging:       No images are attached to the encounter or orders placed in the encounter.     XR Foot 3 Views Standing Left    Result Date: 1/11/2023  Exam performed: Left foot Indication: Heel pain Report: The AP and lateral oblique views were negative for any osseous or soft tissue abnormalities.  The lateral view shows a large hyperostosis at the posterior aspect of the calcaneus near the insertion of the Achilles tendon and the plantar aspect of the calcaneus near the insertion of the plantar aponeurosis.  There is also a hyperostosis on the posterior superior aspect of the calcaneus. Impression: Retrocalcaneal and plantar calcaneal spurs left foot.  Taisha's deformity left foot.    NM Lexiscan stress test    Result Date: 1/24/2023     The nuclear stress test is negative for inducible myocardial ischemia or infarction.    The left ventricular ejection fraction at stress is 70%.    The patient is at a low risk of future cardiac ischemic events.    The  images demonstrate breast attenuation.    There is no prior study for comparison.      XR Foot 3 Views Standing Left    Result Date: 1/11/2023  Exam performed: Left foot Indication: Heel pain Report: The AP and lateral oblique views were negative for any osseous or soft tissue abnormalities.  The lateral view shows a large hyperostosis at the posterior aspect of the calcaneus near the insertion of the Achilles tendon and the  plantar aspect of the calcaneus near the insertion of the plantar aponeurosis.  There is also a hyperostosis on the posterior superior aspect of the calcaneus. Impression: Retrocalcaneal and plantar calcaneal spurs left foot.  Taisha's deformity left foot.    NM Lexiscan stress test    Result Date: 1/24/2023     The nuclear stress test is negative for inducible myocardial ischemia or infarction.    The left ventricular ejection fraction at stress is 70%.    The patient is at a low risk of future cardiac ischemic events.    The  images demonstrate breast attenuation.    There is no prior study for comparison.     Jomar Sosa DPM  [facility] Foot & Ankle Surgery/Podiatry

## 2023-02-01 NOTE — LETTER
2/1/2023         RE: Cherie Montgomery  [address] [place] [id number]        Dear Colleague,    Thank you for referring your patient, Cherie Montgomery, to the [facility]. Please see a copy of my visit note below.    FOOT AND ANKLE SURGERY/PODIATRY CONSULT NOTE         ASSESSMENT:   Left heel  pain  Taisha's deformity left foot  Retrocalcaneal spur left foot  Retrocalcaneal bursitis  Plantar calcaneal spur left foot     TREATMENT:  I informed patient that I recommend surgical intervention.  She will require excision of the retrocalcaneal spur as well as an exostectomy of the posterior superior aspect of the calcaneus.  She was told that the procedure will be performed on an outpatient basis under general anesthesia with a popliteal block.  She was told the procedure takes approximately 45 minutes to perform.  She would then be discharged nonweightbearing for 1 month and partial weightbearing for 1 month in a cam boot.  The patient was asked to go n.p.o. at midnight prior to the procedure and she was asked to see her primary care physician for preoperative consultation.  All pertinent questions were invited and answered.      HPI: Cherie Montgomery presented to the clinic today with continued complaints of severe pain in the back of her left heel.  The patient indicated that she has had this heel pain for approximately 2 years.  She describes it as a aching type pain which is aggravated with weightbearing and ambulation.  She has tried icing and stretching in the past with no relief.  She denies any trauma to her foot.  She has not had any associated redness or swelling.  Most of her pain is relieved with nonweightbearing but she does continue to have some mild discomfort while resting.    Past Medical History:   Diagnosis Date     Anti-phospholipid antibody syndrome (H)      Atrial fibrillation (H)      Dysmenorrhea      Hypertension     with second full term  pregnancy     Obese      Thyroid disease     hypothyroid       Social History     Socioeconomic History     Marital status:      Spouse name: Not on file     Number of children: Not on file     Years of education: Not on file     Highest education level: Not on file   Occupational History     Not on file   Tobacco Use     Smoking status: Former     Types: Cigarettes     Quit date: 2018     Years since quittin.0     Smokeless tobacco: Never   Substance and Sexual Activity     Alcohol use: Not Currently     Comment: 2/month     Drug use: Never     Sexual activity: Yes     Partners: Male   Other Topics Concern     Parent/sibling w/ CABG, MI or angioplasty before 65F 55M? Not Asked   Social History Narrative     Not on file     Social Determinants of Health     Financial Resource Strain: Not on file   Food Insecurity: Not on file   Transportation Needs: Not on file   Physical Activity: Not on file   Stress: Not on file   Social Connections: Not on file   Intimate Partner Violence: Not on file   Housing Stability: Not on file        No Known Allergies       Current Outpatient Medications:      aspirin 81 MG EC tablet, Take 81 mg by mouth daily, Disp: , Rfl:      levothyroxine (SYNTHROID/LEVOTHROID) 50 MCG tablet, Take 50 mcg by mouth daily, Disp: , Rfl:      Prenatal Vit-Fe Fumarate-FA (PRENATAL PO), Take 1 tablet by mouth daily, Disp: , Rfl:      verapamil ER (CALAN-SR) 120 MG CR tablet, Take 1 tablet (120 mg) by mouth At Bedtime, Disp: 90 tablet, Rfl: 3     Vitamin D3 (CHOLECALCIFEROL) 125 MCG (5000 UT) tablet, Take 1 tablet by mouth daily, Disp: , Rfl:      Family History   Problem Relation Age of Onset     Bipolar Disorder Other      Depression Other      Alcoholism Other      Attention Deficit Disorder Other         Social History     Socioeconomic History     Marital status:      Spouse name: Not on file     Number of children: Not on file     Years of education: Not on file     Highest  "education level: Not on file   Occupational History     Not on file   Tobacco Use     Smoking status: Former     Types: Cigarettes     Quit date: 2018     Years since quittin.0     Smokeless tobacco: Never   Substance and Sexual Activity     Alcohol use: Not Currently     Comment: 2/month     Drug use: Never     Sexual activity: Yes     Partners: Male   Other Topics Concern     Parent/sibling w/ CABG, MI or angioplasty before 65F 55M? Not Asked   Social History Narrative     Not on file     Social Determinants of Health     Financial Resource Strain: Not on file   Food Insecurity: Not on file   Transportation Needs: Not on file   Physical Activity: Not on file   Stress: Not on file   Social Connections: Not on file   Intimate Partner Violence: Not on file   Housing Stability: Not on file        Review of Systems - Patient denies fever, chills, rash, wound, stiffness, limping, numbness, weakness, heart burn, blood in stool, chest pain with activity, calf pain when walking, shortness of breath with activity, chronic cough, easy bleeding/bruising, swelling of ankles, excessive thirst, fatigue, depression, anxiety.  Patient admits to left heel pain.      OBJECTIVE:  Appearance: alert, well appearing, and in no distress.    Pulse 67   Ht 1.626 m (5' 4\")   Wt 122.5 kg (270 lb)   SpO2 90%   BMI 46.35 kg/m       Body mass index is 46.35 kg/m .     General appearance: Patient is alert and fully cooperative with history & exam.  No sign of distress is noted during the visit.  Psychiatric: Affect is pleasant & appropriate.  Patient appears motivated to improve health.  Respiratory: Breathing is regular & unlabored while sitting.  HEENT: Hearing is intact to spoken word.  Speech is clear.  No gross evidence of visual impairment that would impact ambulation.    Vascular: Dorsalis pedis and posterior tibial pulses are palpable. There is pedal hair growth bilaterally.  CFT < 3 sec from anterior tibial surface to " distal digits bilaterally. There is no appreciable edema noted.  Dermatologic: Turgor and texture are within normal limits. No coloration or temperature changes. No primary or secondary lesions noted.  Neurologic: All epicritic and proprioceptive sensations are grossly intact bilaterally.  Musculoskeletal: All active and passive ankle, subtalar, midtarsal, and 1st MPJ range of motion are grossly intact without pain or crepitus, with the exception of none. Manual muscle strength is within normal limits bilaterally. All dorsiflexors, plantarflexors, invertors, evertors are intact bilaterally. Ttenderness present to the posterior aspect of the left heel on palpation.  Mild tenderness to the posterior aspect of the left heel with range of motion. Calf is soft/non-tender without warmth/induration    Imaging:       No images are attached to the encounter or orders placed in the encounter.     XR Foot 3 Views Standing Left    Result Date: 1/11/2023  Exam performed: Left foot Indication: Heel pain Report: The AP and lateral oblique views were negative for any osseous or soft tissue abnormalities.  The lateral view shows a large hyperostosis at the posterior aspect of the calcaneus near the insertion of the Achilles tendon and the plantar aspect of the calcaneus near the insertion of the plantar aponeurosis.  There is also a hyperostosis on the posterior superior aspect of the calcaneus. Impression: Retrocalcaneal and plantar calcaneal spurs left foot.  Taisha's deformity left foot.    NM Lexiscan stress test    Result Date: 1/24/2023     The nuclear stress test is negative for inducible myocardial ischemia or infarction.    The left ventricular ejection fraction at stress is 70%.    The patient is at a low risk of future cardiac ischemic events.    The  images demonstrate breast attenuation.    There is no prior study for comparison.      XR Foot 3 Views Standing Left    Result Date: 1/11/2023  Exam  performed: Left foot Indication: Heel pain Report: The AP and lateral oblique views were negative for any osseous or soft tissue abnormalities.  The lateral view shows a large hyperostosis at the posterior aspect of the calcaneus near the insertion of the Achilles tendon and the plantar aspect of the calcaneus near the insertion of the plantar aponeurosis.  There is also a hyperostosis on the posterior superior aspect of the calcaneus. Impression: Retrocalcaneal and plantar calcaneal spurs left foot.  Taisha's deformity left foot.    NM Lexiscan stress test    Result Date: 1/24/2023     The nuclear stress test is negative for inducible myocardial ischemia or infarction.    The left ventricular ejection fraction at stress is 70%.    The patient is at a low risk of future cardiac ischemic events.    The  images demonstrate breast attenuation.    There is no prior study for comparison.     Jomar Sosa DPM  [facility]             Again, thank you for allowing me to participate in the care of your patient.        Sincerely,        Jomar Painter DPM

## 2023-02-03 ENCOUNTER — OFFICE VISIT (OUTPATIENT)
Dept: FAMILY MEDICINE | Facility: CLINIC | Age: 38
End: 2023-02-03
Payer: COMMERCIAL

## 2023-02-03 VITALS
SYSTOLIC BLOOD PRESSURE: 124 MMHG | RESPIRATION RATE: 14 BRPM | TEMPERATURE: 98.2 F | HEART RATE: 91 BPM | WEIGHT: 283 LBS | BODY MASS INDEX: 48.32 KG/M2 | DIASTOLIC BLOOD PRESSURE: 82 MMHG | OXYGEN SATURATION: 98 % | HEIGHT: 64 IN

## 2023-02-03 DIAGNOSIS — Z01.818 PREOP GENERAL PHYSICAL EXAM: Primary | ICD-10-CM

## 2023-02-03 DIAGNOSIS — M79.672 LEFT FOOT PAIN: ICD-10-CM

## 2023-02-03 LAB — HCG UR QL: NEGATIVE

## 2023-02-03 PROCEDURE — 81025 URINE PREGNANCY TEST: CPT | Performed by: FAMILY MEDICINE

## 2023-02-03 PROCEDURE — 99214 OFFICE O/P EST MOD 30 MIN: CPT | Performed by: FAMILY MEDICINE

## 2023-02-03 NOTE — PROGRESS NOTES
[facility]  [address]  [place]  [facility] [address]  Phone: [phone]  Fax: [phone]  Primary Provider: Katia Rm  Pre-op Performing Provider: DEYANIRA GILL    PREOPERATIVE EVALUATION:  Today's date: 2/3/2023    Cherie Montgomery is a 37 year old female who presents for a preoperative evaluation.    Surgical Information:  Surgery/Procedure: Spur removal of L ankle  Surgery Location: [facility]  Surgeon: Dr. Painter  Surgery Date: 2/13/23  Time of Surgery: TBD  Where patient plans to recover: At home with family  Fax number for surgical facility: Note does not need to be faxed, will be available electronically in Epic.    Type of Anesthesia Anticipated: General    Assessment & Plan     The proposed surgical procedure is considered INTERMEDIATE risk.    (Z01.818) Preop general physical exam  (primary encounter diagnosis)  Comment: pt will have Spur removal of L ankle  Plan: HCG Qual, Urine ([id number])        Cleared for the planned     (M79.672) Left foot pain  Comment: pt has left foot pain due to bone spur  Plan: will have Spur removal of L ankle      Possible Sleep Apnea:  Pt snores and sometime tired.    No flowsheet data found.         Risks and Recommendations:  The patient has the following additional risks and recommendations for perioperative complications:   - No identified additional risk factors other than previously addressed    Medication Instructions:  Patient is to take all scheduled medications on the day of surgery  hold all over the counter vitamins and supplement one week before surgery  Hold motrin one day before surgery.   Hold aspirin 7 days before the surgery.     RECOMMENDATION:  APPROVAL GIVEN to proceed with proposed procedure, without further diagnostic evaluation.     Subjective     HPI related to upcoming procedure: Spur removal of L ankle    Preop Questions 2/3/2023   1. Have you ever had a heart attack or  stroke? No   2. Have you ever had surgery on your heart or blood vessels, such as a stent placement, a coronary artery bypass, or surgery on an artery in your head, neck, heart, or legs? No   3. Do you have chest pain with activity? No   4. Do you have a history of  heart failure? No   5. Do you currently have a cold, bronchitis or symptoms of other infection? No   6. Do you have a cough, shortness of breath, or wheezing? No   7. Do you or anyone in your family have previous history of blood clots? No   8. Do you or does anyone in your family have a serious bleeding problem such as prolonged bleeding following surgeries or cuts? No   9. Have you ever had problems with anemia or been told to take iron pills? No   10. Have you had any abnormal blood loss such as black, tarry or bloody stools, or abnormal vaginal bleeding? No   11. Have you ever had a blood transfusion? No   12. Are you willing to have a blood transfusion if it is medically needed before, during, or after your surgery? Yes   13. Have you or any of your relatives ever had problems with anesthesia? No   14. Do you have sleep apnea, excessive snoring or daytime drowsiness? UNKNOWN     15. Do you have any artifical heart valves or other implanted medical devices like a pacemaker, defibrillator, or continuous glucose monitor? No   16. Do you have artificial joints? No   17. Are you allergic to latex? No   18. Is there any chance that you may be pregnant? No       Health Care Directive:  Patient does not have a Health Care Directive or Living Will: Discussed advance care planning with patient; however, patient declined at this time.    Preoperative Review of :   reviewed - no record of controlled substances prescribed.      Status of Chronic Conditions:  HYPOTHYROIDISM - Patient has a longstanding history of chronic Hypothyroidism. Patient has been doing well, noting no tremor, insomnia, hair loss or changes in skin texture. Continues to take  medications as directed, without adverse reactions or side effects. Last TSH   Lab Results   Component Value Date    TSH 5.16 (H) 12/18/2022   .    Pt had a fib s/p cardioversion. She follow-up with cardiologist and is wearing monitor now. Denies palpitation, cp, sob.   Pt has antiphospholipid antibody syndrome with miscarriages. No history of DVT/PE. She takes aspirin 81 mg for  prevention       Review of Systems  CONSTITUTIONAL: NEGATIVE for fever, chills, change in weight  INTEGUMENTARY/SKIN: NEGATIVE for worrisome rashes, moles or lesions  EYES: NEGATIVE for vision changes or irritation  ENT/MOUTH: NEGATIVE for ear, mouth and throat problems  RESP: NEGATIVE for significant cough or SOB  CV: NEGATIVE for chest pain, palpitations or peripheral edema  GI: NEGATIVE for nausea, abdominal pain, heartburn, or change in bowel habits  : NEGATIVE for frequency, dysuria, or hematuria  MUSCULOSKELETAL: NEGATIVE for significant arthralgias or myalgia  NEURO: NEGATIVE for weakness, dizziness or paresthesias  ENDOCRINE: NEGATIVE for temperature intolerance, skin/hair changes  HEME: NEGATIVE for bleeding problems  PSYCHIATRIC: NEGATIVE for changes in mood or affect    Patient Active Problem List    Diagnosis Date Noted     Indication for care in labor or delivery 04/19/2022     Priority: Medium     Pregnancy 04/19/2022     Priority: Medium     Encounter for triage in pregnant patient 04/11/2022     Priority: Medium     Paroxysmal atrial fibrillation (H) 12/21/2021     Priority: Medium     First diagnosed on 6/4/2021 and directly symptomatic with palpitations.  Converted with diltiazem drip.  [id number] score of 1-on aspirin       Hypothyroidism 12/21/2021     Priority: Medium     Antiphospholipid syndrome (H) 12/21/2021     Priority: Medium      Past Medical History:   Diagnosis Date     Anti-phospholipid antibody syndrome (H)      Atrial fibrillation (H)      Dysmenorrhea      Hypertension     with second full term  "pregnancy     Obese      Thyroid disease     hypothyroid     Past Surgical History:   Procedure Laterality Date     DILATION AND CURETTAGE N/A 2018    Procedure: SUCTION DILATION AND CURETTAGE;  Surgeon: Roxanna Moody MD;  Location: [facility];  Service:      DILATION AND CURETTAGE, OPERATIVE HYSTEROSCOPY WITH MORCELLATOR, COMBINED N/A 2019    Procedure: HYSTEROSCOPY, POLYPECTOMY, POSSIBLE DILATION AND CURETTAGE (GUY NEPHEW MORCELLATOR AND FLUID MANAGEMENT);  Surgeon: Katia Rm MD;  Location: [facility]     GYN SURGERY      D & C     TOE SURGERY       Current Outpatient Medications   Medication Sig Dispense Refill     aspirin 81 MG EC tablet Take 81 mg by mouth daily       levothyroxine (SYNTHROID/LEVOTHROID) 50 MCG tablet Take 50 mcg by mouth daily       Prenatal Vit-Fe Fumarate-FA (PRENATAL PO) Take 1 tablet by mouth daily       verapamil ER (CALAN-SR) 120 MG CR tablet Take 1 tablet (120 mg) by mouth At Bedtime 90 tablet 3     Vitamin D3 (CHOLECALCIFEROL) 125 MCG (5000 UT) tablet Take 1 tablet by mouth daily         No Known Allergies     Social History     Tobacco Use     Smoking status: Former     Types: Cigarettes     Quit date: 2018     Years since quittin.0     Passive exposure: Never     Smokeless tobacco: Never   Substance Use Topics     Alcohol use: Not Currently     Comment: 2/month     Family History   Problem Relation Age of Onset     Bipolar Disorder Other      Depression Other      Alcoholism Other      Attention Deficit Disorder Other      History   Drug Use Unknown         Objective     /82 (BP Location: Right arm, Patient Position: Sitting, Cuff Size: Adult Regular)   Pulse 91   Temp 98.2  F (36.8  C) (Oral)   Resp 14   Ht 1.626 m (5' 4\")   Wt 128.4 kg (283 lb)   LMP 2023 (Exact Date)   SpO2 98%   Breastfeeding Yes   BMI 48.58 kg/m      Physical Exam    GENERAL APPEARANCE: healthy, alert and no distress     EYES: EOMI, PERRL     HENT: ear " canals and TM's normal and nose and mouth without ulcers or lesions     NECK: no adenopathy, no asymmetry, masses, or scars and thyroid normal to palpation     RESP: lungs clear to auscultation - no rales, rhonchi or wheezes     CV: regular rates and rhythm, normal S1 S2, no S3 or S4 and no murmur, click or rub     ABDOMEN:  soft, nontender, no HSM or masses and bowel sounds normal     MS: extremities normal- no gross deformities noted, no evidence of inflammation in joints, FROM in all extremities.     SKIN: no suspicious lesions or rashes     NEURO: Normal strength and tone, sensory exam grossly normal, mentation intact and speech normal     PSYCH: mentation appears normal. and affect normal/bright     LYMPHATICS: No cervical adenopathy    Recent Labs   Lab Test 12/18/22  0824 07/15/22  0219   HGB 14.2 13.7    321    140   POTASSIUM 4.6 4.4   CR 0.77 0.97        Diagnostics:  Urine pregnant negative.    No EKG this visit, completed in the last 90 days.    Revised Cardiac Risk Index (RCRI):  The patient has the following serious cardiovascular risks for perioperative complications:   - No serious cardiac risks = 0 points     RCRI Interpretation: 0 points: Class I (very low risk - 0.4% complication rate)           Signed Electronically by: Kimmie Capellan MD  Copy of this evaluation report is provided to requesting physician.

## 2023-02-03 NOTE — H&P (VIEW-ONLY)
[facility]  [address]  [place] [address] [place]  [facility] [address]  Phone: [phone]  Fax: [phone]  Primary Provider: Katia Rm  Pre-op Performing Provider: DEYANIRA GILL    PREOPERATIVE EVALUATION:  Today's date: 2/3/2023    Cherie Montgomery is a 37 year old female who presents for a preoperative evaluation.    Surgical Information:  Surgery/Procedure: Spur removal of L ankle  Surgery Location: [facility]  Surgeon: Dr. Painter  Surgery Date: 2/13/23  Time of Surgery: TBD  Where patient plans to recover: At home with family  Fax number for surgical facility: Note does not need to be faxed, will be available electronically in Epic.    Type of Anesthesia Anticipated: General    Assessment & Plan     The proposed surgical procedure is considered INTERMEDIATE risk.    (Z01.818) Preop general physical exam  (primary encounter diagnosis)  Comment: pt will have Spur removal of L ankle  Plan: HCG Qual, Urine ([id number])        Cleared for the planned     (M79.672) Left foot pain  Comment: pt has left foot pain due to bone spur  Plan: will have Spur removal of L ankle      Possible Sleep Apnea:  Pt snores and sometime tired.    No flowsheet data found.         Risks and Recommendations:  The patient has the following additional risks and recommendations for perioperative complications:   - No identified additional risk factors other than previously addressed    Medication Instructions:  Patient is to take all scheduled medications on the day of surgery  hold all over the counter vitamins and supplement one week before surgery  Hold motrin one day before surgery.   Hold aspirin 7 days before the surgery.     RECOMMENDATION:  APPROVAL GIVEN to proceed with proposed procedure, without further diagnostic evaluation.     Subjective     HPI related to upcoming procedure: Spur removal of L ankle    Preop Questions 2/3/2023   1. Have you ever had a heart attack or  stroke? No   2. Have you ever had surgery on your heart or blood vessels, such as a stent placement, a coronary artery bypass, or surgery on an artery in your head, neck, heart, or legs? No   3. Do you have chest pain with activity? No   4. Do you have a history of  heart failure? No   5. Do you currently have a cold, bronchitis or symptoms of other infection? No   6. Do you have a cough, shortness of breath, or wheezing? No   7. Do you or anyone in your family have previous history of blood clots? No   8. Do you or does anyone in your family have a serious bleeding problem such as prolonged bleeding following surgeries or cuts? No   9. Have you ever had problems with anemia or been told to take iron pills? No   10. Have you had any abnormal blood loss such as black, tarry or bloody stools, or abnormal vaginal bleeding? No   11. Have you ever had a blood transfusion? No   12. Are you willing to have a blood transfusion if it is medically needed before, during, or after your surgery? Yes   13. Have you or any of your relatives ever had problems with anesthesia? No   14. Do you have sleep apnea, excessive snoring or daytime drowsiness? UNKNOWN     15. Do you have any artifical heart valves or other implanted medical devices like a pacemaker, defibrillator, or continuous glucose monitor? No   16. Do you have artificial joints? No   17. Are you allergic to latex? No   18. Is there any chance that you may be pregnant? No       Health Care Directive:  Patient does not have a Health Care Directive or Living Will: Discussed advance care planning with patient; however, patient declined at this time.    Preoperative Review of :   reviewed - no record of controlled substances prescribed.      Status of Chronic Conditions:  HYPOTHYROIDISM - Patient has a longstanding history of chronic Hypothyroidism. Patient has been doing well, noting no tremor, insomnia, hair loss or changes in skin texture. Continues to take  medications as directed, without adverse reactions or side effects. Last TSH   Lab Results   Component Value Date    TSH 5.16 (H) 12/18/2022   .    Pt had a fib s/p cardioversion. She follow-up with cardiologist and is wearing monitor now. Denies palpitation, cp, sob.   Pt has antiphospholipid antibody syndrome with miscarriages. No history of DVT/PE. She takes aspirin 81 mg for  prevention       Review of Systems  CONSTITUTIONAL: NEGATIVE for fever, chills, change in weight  INTEGUMENTARY/SKIN: NEGATIVE for worrisome rashes, moles or lesions  EYES: NEGATIVE for vision changes or irritation  ENT/MOUTH: NEGATIVE for ear, mouth and throat problems  RESP: NEGATIVE for significant cough or SOB  CV: NEGATIVE for chest pain, palpitations or peripheral edema  GI: NEGATIVE for nausea, abdominal pain, heartburn, or change in bowel habits  : NEGATIVE for frequency, dysuria, or hematuria  MUSCULOSKELETAL: NEGATIVE for significant arthralgias or myalgia  NEURO: NEGATIVE for weakness, dizziness or paresthesias  ENDOCRINE: NEGATIVE for temperature intolerance, skin/hair changes  HEME: NEGATIVE for bleeding problems  PSYCHIATRIC: NEGATIVE for changes in mood or affect    Patient Active Problem List    Diagnosis Date Noted     Indication for care in labor or delivery 04/19/2022     Priority: Medium     Pregnancy 04/19/2022     Priority: Medium     Encounter for triage in pregnant patient 04/11/2022     Priority: Medium     Paroxysmal atrial fibrillation (H) 12/21/2021     Priority: Medium     First diagnosed on 6/4/2021 and directly symptomatic with palpitations.  Converted with diltiazem drip.  [id number] score of 1-on aspirin       Hypothyroidism 12/21/2021     Priority: Medium     Antiphospholipid syndrome (H) 12/21/2021     Priority: Medium      Past Medical History:   Diagnosis Date     Anti-phospholipid antibody syndrome (H)      Atrial fibrillation (H)      Dysmenorrhea      Hypertension     with second full term  "pregnancy     Obese      Thyroid disease     hypothyroid     Past Surgical History:   Procedure Laterality Date     DILATION AND CURETTAGE N/A 2018    Procedure: SUCTION DILATION AND CURETTAGE;  Surgeon: Roxanna Moody MD;  Location: [facility];  Service:      DILATION AND CURETTAGE, OPERATIVE HYSTEROSCOPY WITH MORCELLATOR, COMBINED N/A 2019    Procedure: HYSTEROSCOPY, POLYPECTOMY, POSSIBLE DILATION AND CURETTAGE (GUY NEPHEW MORCELLATOR AND FLUID MANAGEMENT);  Surgeon: Katia Rm MD;  Location: [facility]     GYN SURGERY      D & C     TOE SURGERY       Current Outpatient Medications   Medication Sig Dispense Refill     aspirin 81 MG EC tablet Take 81 mg by mouth daily       levothyroxine (SYNTHROID/LEVOTHROID) 50 MCG tablet Take 50 mcg by mouth daily       Prenatal Vit-Fe Fumarate-FA (PRENATAL PO) Take 1 tablet by mouth daily       verapamil ER (CALAN-SR) 120 MG CR tablet Take 1 tablet (120 mg) by mouth At Bedtime 90 tablet 3     Vitamin D3 (CHOLECALCIFEROL) 125 MCG (5000 UT) tablet Take 1 tablet by mouth daily         No Known Allergies     Social History     Tobacco Use     Smoking status: Former     Types: Cigarettes     Quit date: 2018     Years since quittin.0     Passive exposure: Never     Smokeless tobacco: Never   Substance Use Topics     Alcohol use: Not Currently     Comment: 2/month     Family History   Problem Relation Age of Onset     Bipolar Disorder Other      Depression Other      Alcoholism Other      Attention Deficit Disorder Other      History   Drug Use Unknown         Objective     /82 (BP Location: Right arm, Patient Position: Sitting, Cuff Size: Adult Regular)   Pulse 91   Temp 98.2  F (36.8  C) (Oral)   Resp 14   Ht 1.626 m (5' 4\")   Wt 128.4 kg (283 lb)   LMP 2023 (Exact Date)   SpO2 98%   Breastfeeding Yes   BMI 48.58 kg/m      Physical Exam    GENERAL APPEARANCE: healthy, alert and no distress     EYES: EOMI, PERRL     HENT: ear " canals and TM's normal and nose and mouth without ulcers or lesions     NECK: no adenopathy, no asymmetry, masses, or scars and thyroid normal to palpation     RESP: lungs clear to auscultation - no rales, rhonchi or wheezes     CV: regular rates and rhythm, normal S1 S2, no S3 or S4 and no murmur, click or rub     ABDOMEN:  soft, nontender, no HSM or masses and bowel sounds normal     MS: extremities normal- no gross deformities noted, no evidence of inflammation in joints, FROM in all extremities.     SKIN: no suspicious lesions or rashes     NEURO: Normal strength and tone, sensory exam grossly normal, mentation intact and speech normal     PSYCH: mentation appears normal. and affect normal/bright     LYMPHATICS: No cervical adenopathy    Recent Labs   Lab Test 12/18/22  0824 07/15/22  0219   HGB 14.2 13.7    321    140   POTASSIUM 4.6 4.4   CR 0.77 0.97        Diagnostics:  Urine pregnant negative.    No EKG this visit, completed in the last 90 days.    Revised Cardiac Risk Index (RCRI):  The patient has the following serious cardiovascular risks for perioperative complications:   - No serious cardiac risks = 0 points     RCRI Interpretation: 0 points: Class I (very low risk - 0.4% complication rate)           Signed Electronically by: Kimmie Capellan MD  Copy of this evaluation report is provided to requesting physician.

## 2023-02-03 NOTE — PATIENT INSTRUCTIONS
For informational purposes only. Not to replace the advice of your health care provider. Copyright   2003,  [facility]. All rights reserved. Clinically reviewed by Claudia Stein MD. [email or web address] [id number] - REV .  Preparing for Your Surgery  Getting started  A nurse will call you to review your health history and instructions. They will give you an arrival time based on your scheduled surgery time. Please be ready to share:    Your doctor's clinic name and phone number    Your medical, surgical, and anesthesia history    A list of allergies and sensitivities    A list of medicines, including herbal treatments and over-the-counter drugs    Whether the patient has a legal guardian (ask how to send us the papers in advance)  Please tell us if you're pregnant--or if there's any chance you might be pregnant. Some surgeries may injure a fetus (unborn baby), so they require a pregnancy test. Surgeries that are safe for a fetus don't always need a test, and you can choose whether to have one.   If you have a child who's having surgery, please ask for a copy of Preparing for Your Child's Surgery.    Preparing for surgery    Within 10 to 30 days of surgery: Have a pre-op exam (sometimes called an H&P, or History and Physical). This can be done at a clinic or pre-operative center.  ? If you're having a , you may not need this exam. Talk to your care team.    At your pre-op exam, talk to your care team about all medicines you take. If you need to stop any medicines before surgery, ask when to start taking them again.  ? We do this for your safety. Many medicines can make you bleed too much during surgery. Some change how well surgery (anesthesia) drugs work.    Call your insurance company to let them know you're having surgery. (If you don't have insurance, call [phone].)    Call your clinic if there's any change in your health. This includes signs of a cold or flu (sore throat, runny nose,  cough, rash, fever). It also includes a scrape or scratch near the surgery site.    If you have questions on the day of surgery, call your hospital or surgery center.  Eating and drinking guidelines  For your safety: Unless your surgeon tells you otherwise, follow the guidelines below.    Eat and drink as usual until 8 hours before you arrive for surgery. After that, no food or milk.    Drink clear liquids until 2 hours before you arrive. These are liquids you can see through, like water, Gatorade, and Propel Water. They also include plain black coffee and tea (no cream or milk), candy, and breath mints. You can spit out gum when you arrive.    If you drink alcohol: Stop drinking it the night before surgery.    If your care team tells you to take medicine on the morning of surgery, it's okay to take it with a sip of water.  Preventing infection    Shower or bathe the night before and morning of your surgery. Follow the instructions your clinic gave you. (If no instructions, use regular soap.)    Don't shave or clip hair near your surgery site. We'll remove the hair if needed.    Don't smoke or vape the morning of surgery. You may chew nicotine gum up to 2 hours before surgery. A nicotine patch is okay.  ? Note: Some surgeries require you to completely quit smoking and nicotine. Check with your surgeon.    Your care team will make every effort to keep you safe from infection. We will:  ? Clean our hands often with soap and water (or an alcohol-based hand rub).  ? Clean the skin at your surgery site with a special soap that kills germs.  ? Give you a special gown to keep you warm. (Cold raises the risk of infection.)  ? Wear special hair covers, masks, gowns and gloves during surgery.  ? Give antibiotic medicine, if prescribed. Not all surgeries need antibiotics.  What to bring on the day of surgery    Photo ID and insurance card    Copy of your health care directive, if you have one    Glasses and hearing aids (bring  cases)  ? You can't wear contacts during surgery    Inhaler and eye drops, if you use them (tell us about these when you arrive)    CPAP machine or breathing device, if you use them    A few personal items, if spending the night    If you have . . .  ? A pacemaker, ICD (cardiac defibrillator) or other implant: Bring the ID card.  ? An implanted stimulator: Bring the remote control.  ? A legal guardian: Bring a copy of the certified (court-stamped) guardianship papers.  Please remove any jewelry, including body piercings. Leave jewelry and other valuables at home.  If you're going home the day of surgery    You must have a responsible adult drive you home. They should stay with you overnight as well.    If you don't have someone to stay with you, and you aren't safe to go home alone, we may keep you overnight. Insurance often won't pay for this.  After surgery  If it's hard to control your pain or you need more pain medicine, please call your surgeon's office.  Questions?   If you have any questions for your care team, list them here: _________________________________________________________________________________________________________________________________________________________________________ ____________________________________ ____________________________________ ____________________________________

## 2023-02-13 ENCOUNTER — HOSPITAL ENCOUNTER (OUTPATIENT)
Facility: HOSPITAL | Age: 38
Discharge: HOME | End: 2023-02-13
Attending: PODIATRIST | Admitting: PODIATRIST
Payer: COMMERCIAL

## 2023-02-13 ENCOUNTER — ANESTHESIA EVENT (OUTPATIENT)
Dept: SURGERY | Facility: HOSPITAL | Age: 38
End: 2023-02-13
Payer: COMMERCIAL

## 2023-02-13 ENCOUNTER — ANESTHESIA (OUTPATIENT)
Dept: SURGERY | Facility: HOSPITAL | Age: 38
End: 2023-02-13
Payer: COMMERCIAL

## 2023-02-13 VITALS
BODY MASS INDEX: 48.1 KG/M2 | HEART RATE: 85 BPM | WEIGHT: 280.2 LBS | OXYGEN SATURATION: 93 % | RESPIRATION RATE: 15 BRPM | SYSTOLIC BLOOD PRESSURE: 134 MMHG | TEMPERATURE: 97.3 F | DIASTOLIC BLOOD PRESSURE: 70 MMHG

## 2023-02-13 DIAGNOSIS — M77.32 BONE SPUR OF POSTERIOR PORTION OF LEFT CALCANEUS: Primary | ICD-10-CM

## 2023-02-13 PROCEDURE — 710N000012 HC RECOVERY PHASE 2, PER MINUTE: Performed by: PODIATRIST

## 2023-02-13 PROCEDURE — 710N000009 HC RECOVERY PHASE 1, LEVEL 1, PER MIN: Performed by: PODIATRIST

## 2023-02-13 PROCEDURE — 999N000141 HC STATISTIC PRE-PROCEDURE NURSING ASSESSMENT: Performed by: PODIATRIST

## 2023-02-13 PROCEDURE — 272N000001 HC OR GENERAL SUPPLY STERILE: Performed by: PODIATRIST

## 2023-02-13 PROCEDURE — 28119 REMOVAL OF HEEL SPUR: CPT | Mod: LT | Performed by: PODIATRIST

## 2023-02-13 PROCEDURE — 258N000003 HC RX IP 258 OP 636: Performed by: ANESTHESIOLOGY

## 2023-02-13 PROCEDURE — 27654 REPAIR OF ACHILLES TENDON: CPT | Mod: 51 | Performed by: PODIATRIST

## 2023-02-13 PROCEDURE — 250N000009 HC RX 250: Performed by: NURSE ANESTHETIST, CERTIFIED REGISTERED

## 2023-02-13 PROCEDURE — 370N000017 HC ANESTHESIA TECHNICAL FEE, PER MIN: Performed by: PODIATRIST

## 2023-02-13 PROCEDURE — 250N000025 HC SEVOFLURANE, PER MIN: Performed by: PODIATRIST

## 2023-02-13 PROCEDURE — 250N000011 HC RX IP 250 OP 636: Performed by: NURSE ANESTHETIST, CERTIFIED REGISTERED

## 2023-02-13 PROCEDURE — 250N000009 HC RX 250: Performed by: PODIATRIST

## 2023-02-13 PROCEDURE — 360N000076 HC SURGERY LEVEL 3, PER MIN: Performed by: PODIATRIST

## 2023-02-13 PROCEDURE — C1713 ANCHOR/SCREW BN/BN,TIS/BN: HCPCS | Performed by: PODIATRIST

## 2023-02-13 PROCEDURE — 250N000009 HC RX 250: Performed by: ANESTHESIOLOGY

## 2023-02-13 PROCEDURE — 250N000011 HC RX IP 250 OP 636: Performed by: PODIATRIST

## 2023-02-13 PROCEDURE — 250N000011 HC RX IP 250 OP 636: Performed by: ANESTHESIOLOGY

## 2023-02-13 DEVICE — IMPL SYS,BIO-COMP ACHILLES SPEEDBRG
Type: IMPLANTABLE DEVICE | Site: FOOT | Status: FUNCTIONAL
Brand: ARTHREX®

## 2023-02-13 RX ORDER — CEPHALEXIN 500 MG/1
500 CAPSULE ORAL 2 TIMES DAILY
Qty: 14 CAPSULE | Refills: 0 | Status: SHIPPED | OUTPATIENT
Start: 2023-02-13 | End: 2023-02-20

## 2023-02-13 RX ORDER — OXYCODONE HYDROCHLORIDE 5 MG/1
10 TABLET ORAL EVERY 4 HOURS PRN
Status: DISCONTINUED | OUTPATIENT
Start: 2023-02-13 | End: 2023-02-13 | Stop reason: HOSPADM

## 2023-02-13 RX ORDER — SODIUM CHLORIDE, SODIUM LACTATE, POTASSIUM CHLORIDE, CALCIUM CHLORIDE 600; 310; 30; 20 MG/100ML; MG/100ML; MG/100ML; MG/100ML
INJECTION, SOLUTION INTRAVENOUS CONTINUOUS
Status: DISCONTINUED | OUTPATIENT
Start: 2023-02-13 | End: 2023-02-13 | Stop reason: HOSPADM

## 2023-02-13 RX ORDER — POVIDONE-IODINE 10 MG/G
OINTMENT TOPICAL PRN
Status: DISCONTINUED | OUTPATIENT
Start: 2023-02-13 | End: 2023-02-13 | Stop reason: HOSPADM

## 2023-02-13 RX ORDER — HYDROMORPHONE HYDROCHLORIDE 1 MG/ML
0.2 INJECTION, SOLUTION INTRAMUSCULAR; INTRAVENOUS; SUBCUTANEOUS EVERY 5 MIN PRN
Status: DISCONTINUED | OUTPATIENT
Start: 2023-02-13 | End: 2023-02-13 | Stop reason: HOSPADM

## 2023-02-13 RX ORDER — FENTANYL CITRATE 50 UG/ML
50 INJECTION, SOLUTION INTRAMUSCULAR; INTRAVENOUS EVERY 5 MIN PRN
Status: DISCONTINUED | OUTPATIENT
Start: 2023-02-13 | End: 2023-02-13 | Stop reason: HOSPADM

## 2023-02-13 RX ORDER — HYDROMORPHONE HYDROCHLORIDE 1 MG/ML
0.4 INJECTION, SOLUTION INTRAMUSCULAR; INTRAVENOUS; SUBCUTANEOUS EVERY 5 MIN PRN
Status: DISCONTINUED | OUTPATIENT
Start: 2023-02-13 | End: 2023-02-13 | Stop reason: HOSPADM

## 2023-02-13 RX ORDER — ONDANSETRON 4 MG/1
4 TABLET, ORALLY DISINTEGRATING ORAL EVERY 30 MIN PRN
Status: DISCONTINUED | OUTPATIENT
Start: 2023-02-13 | End: 2023-02-13 | Stop reason: HOSPADM

## 2023-02-13 RX ORDER — NALOXONE HYDROCHLORIDE 0.4 MG/ML
0.2 INJECTION, SOLUTION INTRAMUSCULAR; INTRAVENOUS; SUBCUTANEOUS
Status: DISCONTINUED | OUTPATIENT
Start: 2023-02-13 | End: 2023-02-13 | Stop reason: HOSPADM

## 2023-02-13 RX ORDER — LIDOCAINE HYDROCHLORIDE 10 MG/ML
INJECTION, SOLUTION INFILTRATION; PERINEURAL PRN
Status: DISCONTINUED | OUTPATIENT
Start: 2023-02-13 | End: 2023-02-13

## 2023-02-13 RX ORDER — FENTANYL CITRATE 50 UG/ML
25 INJECTION, SOLUTION INTRAMUSCULAR; INTRAVENOUS
Status: DISCONTINUED | OUTPATIENT
Start: 2023-02-13 | End: 2023-02-13 | Stop reason: HOSPADM

## 2023-02-13 RX ORDER — DEXAMETHASONE SODIUM PHOSPHATE 10 MG/ML
INJECTION, SOLUTION INTRAMUSCULAR; INTRAVENOUS PRN
Status: DISCONTINUED | OUTPATIENT
Start: 2023-02-13 | End: 2023-02-13

## 2023-02-13 RX ORDER — NALOXONE HYDROCHLORIDE 0.4 MG/ML
0.4 INJECTION, SOLUTION INTRAMUSCULAR; INTRAVENOUS; SUBCUTANEOUS
Status: DISCONTINUED | OUTPATIENT
Start: 2023-02-13 | End: 2023-02-13 | Stop reason: HOSPADM

## 2023-02-13 RX ORDER — FENTANYL CITRATE 50 UG/ML
25 INJECTION, SOLUTION INTRAMUSCULAR; INTRAVENOUS EVERY 5 MIN PRN
Status: DISCONTINUED | OUTPATIENT
Start: 2023-02-13 | End: 2023-02-13 | Stop reason: HOSPADM

## 2023-02-13 RX ORDER — PROPOFOL 10 MG/ML
INJECTION, EMULSION INTRAVENOUS CONTINUOUS PRN
Status: DISCONTINUED | OUTPATIENT
Start: 2023-02-13 | End: 2023-02-13

## 2023-02-13 RX ORDER — ONDANSETRON 2 MG/ML
INJECTION INTRAMUSCULAR; INTRAVENOUS PRN
Status: DISCONTINUED | OUTPATIENT
Start: 2023-02-13 | End: 2023-02-13

## 2023-02-13 RX ORDER — PROPOFOL 10 MG/ML
INJECTION, EMULSION INTRAVENOUS PRN
Status: DISCONTINUED | OUTPATIENT
Start: 2023-02-13 | End: 2023-02-13

## 2023-02-13 RX ORDER — OXYCODONE HYDROCHLORIDE 5 MG/1
5 TABLET ORAL EVERY 4 HOURS PRN
Status: DISCONTINUED | OUTPATIENT
Start: 2023-02-13 | End: 2023-02-13 | Stop reason: HOSPADM

## 2023-02-13 RX ORDER — LIDOCAINE 40 MG/G
CREAM TOPICAL
Status: DISCONTINUED | OUTPATIENT
Start: 2023-02-13 | End: 2023-02-13 | Stop reason: HOSPADM

## 2023-02-13 RX ORDER — FENTANYL CITRATE 50 UG/ML
25-100 INJECTION, SOLUTION INTRAMUSCULAR; INTRAVENOUS
Status: DISCONTINUED | OUTPATIENT
Start: 2023-02-13 | End: 2023-02-13 | Stop reason: HOSPADM

## 2023-02-13 RX ORDER — FENTANYL CITRATE 50 UG/ML
INJECTION, SOLUTION INTRAMUSCULAR; INTRAVENOUS PRN
Status: DISCONTINUED | OUTPATIENT
Start: 2023-02-13 | End: 2023-02-13

## 2023-02-13 RX ORDER — ONDANSETRON 2 MG/ML
4 INJECTION INTRAMUSCULAR; INTRAVENOUS EVERY 30 MIN PRN
Status: DISCONTINUED | OUTPATIENT
Start: 2023-02-13 | End: 2023-02-13 | Stop reason: HOSPADM

## 2023-02-13 RX ORDER — HYDROCODONE BITARTRATE AND ACETAMINOPHEN 5; 325 MG/1; MG/1
1-2 TABLET ORAL EVERY 6 HOURS PRN
Qty: 20 TABLET | Refills: 0 | Status: SHIPPED | OUTPATIENT
Start: 2023-02-13 | End: 2023-02-22

## 2023-02-13 RX ORDER — ONDANSETRON 2 MG/ML
4 INJECTION INTRAMUSCULAR; INTRAVENOUS
Status: COMPLETED | OUTPATIENT
Start: 2023-02-13 | End: 2023-02-13

## 2023-02-13 RX ORDER — CEFAZOLIN SODIUM/WATER 3 G/30 ML
3 SYRINGE (ML) INTRAVENOUS
Status: COMPLETED | OUTPATIENT
Start: 2023-02-13 | End: 2023-02-13

## 2023-02-13 RX ORDER — BUPIVACAINE HYDROCHLORIDE 5 MG/ML
INJECTION, SOLUTION EPIDURAL; INTRACAUDAL
Status: COMPLETED | OUTPATIENT
Start: 2023-02-13 | End: 2023-02-13

## 2023-02-13 RX ADMIN — ROCURONIUM BROMIDE 40 MG: 50 INJECTION, SOLUTION INTRAVENOUS at 11:18

## 2023-02-13 RX ADMIN — ONDANSETRON 4 MG: 2 INJECTION INTRAMUSCULAR; INTRAVENOUS at 15:34

## 2023-02-13 RX ADMIN — Medication 3 G: at 11:15

## 2023-02-13 RX ADMIN — BUPIVACAINE HYDROCHLORIDE 30 ML: 5 INJECTION, SOLUTION EPIDURAL; INTRACAUDAL; PERINEURAL at 11:29

## 2023-02-13 RX ADMIN — FENTANYL CITRATE 25 MCG: 50 INJECTION, SOLUTION INTRAMUSCULAR; INTRAVENOUS at 12:49

## 2023-02-13 RX ADMIN — SODIUM CHLORIDE, POTASSIUM CHLORIDE, SODIUM LACTATE AND CALCIUM CHLORIDE: 600; 310; 30; 20 INJECTION, SOLUTION INTRAVENOUS at 11:15

## 2023-02-13 RX ADMIN — FENTANYL CITRATE 25 MCG: 50 INJECTION, SOLUTION INTRAMUSCULAR; INTRAVENOUS at 11:54

## 2023-02-13 RX ADMIN — PROPOFOL 25 MG: 10 INJECTION, EMULSION INTRAVENOUS at 11:55

## 2023-02-13 RX ADMIN — DEXAMETHASONE SODIUM PHOSPHATE 10 MG: 10 INJECTION, SOLUTION INTRAMUSCULAR; INTRAVENOUS at 11:18

## 2023-02-13 RX ADMIN — SODIUM CHLORIDE, POTASSIUM CHLORIDE, SODIUM LACTATE AND CALCIUM CHLORIDE: 600; 310; 30; 20 INJECTION, SOLUTION INTRAVENOUS at 11:53

## 2023-02-13 RX ADMIN — ONDANSETRON 4 MG: 2 INJECTION INTRAMUSCULAR; INTRAVENOUS at 12:13

## 2023-02-13 RX ADMIN — LIDOCAINE HYDROCHLORIDE 3 ML: 10 INJECTION, SOLUTION INFILTRATION; PERINEURAL at 11:18

## 2023-02-13 RX ADMIN — FENTANYL CITRATE 50 MCG: 50 INJECTION, SOLUTION INTRAMUSCULAR; INTRAVENOUS at 11:18

## 2023-02-13 RX ADMIN — PROPOFOL 200 MG: 10 INJECTION, EMULSION INTRAVENOUS at 11:18

## 2023-02-13 RX ADMIN — PROPOFOL 25 MCG/KG/MIN: 10 INJECTION, EMULSION INTRAVENOUS at 11:48

## 2023-02-13 RX ADMIN — SUGAMMADEX 200 MG: 100 INJECTION, SOLUTION INTRAVENOUS at 12:42

## 2023-02-13 RX ADMIN — MIDAZOLAM 2 MG: 1 INJECTION INTRAMUSCULAR; INTRAVENOUS at 11:14

## 2023-02-13 ASSESSMENT — ACTIVITIES OF DAILY LIVING (ADL)
ADLS_ACUITY_SCORE: 20

## 2023-02-13 ASSESSMENT — ENCOUNTER SYMPTOMS: DYSRHYTHMIAS: 1

## 2023-02-13 NOTE — ANESTHESIA CARE TRANSFER NOTE
Patient: Cherie Montgomery    Procedure: Procedure(s):  Excision retrocalcaneal spur left foot       Diagnosis: Bone spur of posterior portion of left calcaneus [M77.32]  Diagnosis Additional Information: No value filed.    Anesthesia Type:   General     Note:    Oropharynx: oropharynx clear of all foreign objects  Level of Consciousness: awake  Oxygen Supplementation: face mask  Level of Supplemental Oxygen (L/min / FiO2): 6  Independent Airway: airway patency satisfactory and stable  Dentition: dentition unchanged  Vital Signs Stable: post-procedure vital signs reviewed and stable  Report to RN Given: handoff report given  Patient transferred to: PACU    Handoff Report: Identifed the Patient, Identified the Reponsible Provider, Reviewed the pertinent medical history, Discussed the surgical course, Reviewed Intra-OP anesthesia mangement and issues during anesthesia, Set expectations for post-procedure period and Allowed opportunity for questions and acknowledgement of understanding      Vitals:  Vitals Value Taken Time   /82 02/13/23 1300   Temp 97.7    Pulse 83 02/13/23 1259   Resp 10 02/13/23 1259   SpO2 100 % 02/13/23 1259   Vitals shown include unvalidated device data.    Electronically Signed By: JANEY Valdivia CRNA  February 13, 2023  1:01 PM

## 2023-02-13 NOTE — OP NOTE
Date of surgery: 2/13/2023     Surgeon: Jomar Painter DPM    Preoperative diagnosis: Retrocalcaneal spur left lower extremity    Postoperative: Diagnosis same    Procedure: Excision retrocalcaneal spur with secondary repair of Achilles tendon    Anesthesia: Gen. with popliteal block    Hemostasis: Pneumatic thigh tourniquet 300 mmHg    Medications:  3 g of Ancef  preoperatively    Blood loss: None    Specimens: None    Complications: None    Description: The patient was taken to the operating room and placed on the table in the prone position.  Under general anesthesia with a popliteal block the left lower extremity was prepped and draped in usual aseptic manner.  Following exsanguination of the left lower extremity with a Darian bandage the tourniquet was inflated and the following procedures were performed.      Attention was directed to the posterior aspect of the left lower extremity where a lazy S incision was made at the level of the insertion of the Achilles tendon.  The incision started proximal medial and distal lateral.  The incision was then deepened via sharp and blunt dissection with care being taken to identify and retract all vital structures.  Next an incision was made on the central portion of the Achilles tendon and the insertion of the Achilles tendon was reflected medially and laterally.  The most medial and lateral portions of the insertion remained intact with the bone.  Once this had been accomplished, large hyperostosis was easily visible within the surgical site.  Next utilizing an osteotome and mallet large hyperostosis was resected.  The remaining portion of bone was rasped with a reciprocating rasp.  The wound was then flushed with copious amounts of sterile solution and solution.  Next the insertion of the Achilles tendon was prepared for reattachment to the posterior aspect of the calcaneus utilizing the Arthrex speed bridge bone anchor system.  Once the tendon and the insertion was  reapproximated and reinserted to the bone was noted to be in good position and stable.  The remaining portion of tendon was repaired utilizing 2-0 Monocryl suture material.  Deep closure was accomplished utilizing 2-0 Monocryl suture material.  The wound was reinforced with half-inch Steri-Strips. A sterile dressing was then applied to the left foot comprising of Betadine ointment, Adaptic, 4 x 4 gauze, Kerlix and an Ace bandage.  The tourniquet was deflated and normal color returned to all the digits of the left foot.  The patient appeared to tolerate the procedures and anesthesia well and was transported from the operating room to recovery room with vital signs stable and neurovascular status intact.

## 2023-02-13 NOTE — ANESTHESIA PROCEDURE NOTES
Airway       Patient location during procedure: OR       Procedure Start/Stop Times: 2/13/2023 11:20 AM and 2/13/2023 11:24 AM  Staff -        CRNA: Kristopher Rabago APRN CRNA       Performed By: CRNA  Consent for Airway        Urgency: elective  Indications and Patient Condition       Indications for airway management: nilsa-procedural       Induction type:intravenous       Mask difficulty assessment: 1 - vent by mask    Final Airway Details       Final airway type: endotracheal airway       Successful airway: ETT - single  Endotracheal Airway Details        ETT size (mm): 7.0       Cuffed: yes       Successful intubation technique: direct laryngoscopy       DL Blade Type: Conteh 2       Grade View of Cords: 1       Adjucts: stylet       Position: Right       Measured from: lips       Secured at (cm): 20       Bite block used: Soft    Post intubation assessment        Placement verified by: capnometry, equal breath sounds and chest rise        Number of attempts at approach: 1       Number of other approaches attempted: 0       Secured with: silk tape       Ease of procedure: easy       Dentition: Intact and Unchanged       Dental guard used and removed. Dental Guard Type: Proguard Red.    Medication(s) Administered   Medication Administration Time: 2/13/2023 11:20 AM

## 2023-02-13 NOTE — ANESTHESIA POSTPROCEDURE EVALUATION
Patient: Cherie Montgomery    Procedure: Procedure(s):  Excision retrocalcaneal spur left foot       Anesthesia Type:  General    Note:  Disposition: Inpatient   Postop Pain Control: Uneventful            Sign Out: Well controlled pain   PONV: No   Neuro/Psych: Uneventful            Sign Out: Acceptable/Baseline neuro status   Airway/Respiratory: Uneventful            Sign Out: Acceptable/Baseline resp. status   CV/Hemodynamics: Uneventful            Sign Out: Acceptable CV status; No obvious hypovolemia; No obvious fluid overload   Other NRE: NONE   DID A NON-ROUTINE EVENT OCCUR? No           Last vitals:  Vitals Value Taken Time   /79 02/13/23 1315   Temp 36.2  C (97.2  F) 02/13/23 1300   Pulse 75 02/13/23 1327   Resp 11 02/13/23 1327   SpO2 96 % 02/13/23 1327   Vitals shown include unvalidated device data.    Electronically Signed By: Senthil Ortiz MD  February 13, 2023  1:29 PM

## 2023-02-13 NOTE — ANESTHESIA PROCEDURE NOTES
Sciatic Procedure Note    Pre-Procedure   Staff -        Anesthesiologist:  Chapin Garner MD       Performed By: anesthesiologist       Location: OR       Procedure Start/Stop Times: 2/13/2023 11:29 AM and 2/13/2023 11:34 AM       Pre-Anesthestic Checklist: patient identified, IV checked, site marked, risks and benefits discussed, informed consent, monitors and equipment checked, pre-op evaluation, at physician/surgeon's request and post-op pain management  Timeout:       Correct Patient: Yes        Correct Procedure: Yes        Correct Site: Yes        Correct Position: Yes        Correct Laterality: Yes        Site Marked: Yes  Procedure Documentation  Procedure: Sciatic       Diagnosis: CALCANEAL BONE SPUR       Laterality: left       Patient Position: supine       Patient Prep/Sterile Barriers: sterile gloves, mask       Skin prep: Chloraprep (popliteal approach).       Needle Type: short bevel and insulated       Needle Gauge: 20.        Needle Length (Inches): 4        Ultrasound guided       1. Ultrasound was used to identify targeted nerve, plexus, vascular marker, or fascial plane and place a needle adjacent to it in real-time.       2. Ultrasound was used to visualize the spread of anesthetic in close proximity to the above referenced structure.       3. A permanent image is entered into the patient's record.       4. The visualized anatomic structures appeared normal.       5. There were no apparent abnormal pathologic findings.    Assessment/Narrative         The placement was negative for: blood aspirated, painful injection and site bleeding       Bolus given via needle..        Secured via.        Insertion/Infusion Method: Single Shot       Complications: none       Injection made incrementally with aspirations every 3 mL.    Medication(s) Administered   Bupivacaine 0.5% PF (Infiltration) - Infiltration   30 mL - 2/13/2023 11:29:00 AM  Medication Administration Time: 2/13/2023 11:29  "AM      FOR [facility] ONLY:   Pain Team Contact information: please page the Pain Team Via [employer]. Search \"Pain\". During daytime hours, please page the attending first. At night please page the resident first.    "

## 2023-02-13 NOTE — ANESTHESIA PREPROCEDURE EVALUATION
Anesthesia Pre-Procedure Evaluation    Patient: Cherie Montgomery   MRN: [id number] : 1985        Procedure : Procedure(s):  Excision retrocalcaneal spur left foot          Past Medical History:   Diagnosis Date     Anti-phospholipid antibody syndrome (H)      Atrial fibrillation (H)      Dysmenorrhea      Hypertension     with second full term pregnancy     Obese      Thyroid disease     hypothyroid      Past Surgical History:   Procedure Laterality Date     DILATION AND CURETTAGE N/A 2018    Procedure: SUCTION DILATION AND CURETTAGE;  Surgeon: Roxanna Moody MD;  Location: [facility];  Service:      DILATION AND CURETTAGE, OPERATIVE HYSTEROSCOPY WITH MORCELLATOR, COMBINED N/A 2019    Procedure: HYSTEROSCOPY, POLYPECTOMY, POSSIBLE DILATION AND CURETTAGE (GUY NEPHTHUBIT MORCELLATOR AND FLUID MANAGEMENT);  Surgeon: Katia Rm MD;  Location: [facility]     GYN SURGERY      D & C     TOE SURGERY        No Known Allergies   Social History     Tobacco Use     Smoking status: Former     Types: Cigarettes     Quit date: 2018     Years since quittin.0     Passive exposure: Never     Smokeless tobacco: Never   Substance Use Topics     Alcohol use: Not Currently     Comment: 2/month      Wt Readings from Last 1 Encounters:   23 127.1 kg (280 lb 3.2 oz)        Anesthesia Evaluation   Pt has had prior anesthetic.         ROS/MED HX  ENT/Pulmonary:  - neg pulmonary ROS     Neurologic:       Cardiovascular:     (+) hypertension-----dysrhythmias, a-fib,     METS/Exercise Tolerance:     Hematologic:  - neg hematologic  ROS     Musculoskeletal:  - neg musculoskeletal ROS     GI/Hepatic:       Renal/Genitourinary:       Endo:     (+) thyroid problem, Obesity,     Psychiatric/Substance Use:     (+) psychiatric history anxiety     Infectious Disease:  - neg infectious disease ROS     Malignancy:  - neg malignancy ROS     Other:            Physical Exam    Airway  airway exam normal       Mallampati: I   TM distance: > 3 FB   Neck ROM: full   Mouth opening: > 3 cm    Respiratory Devices and Support         Dental       (+) Completely normal teeth      Cardiovascular   cardiovascular exam normal       Rhythm and rate: regular and normal     Pulmonary   pulmonary exam normal        breath sounds clear to auscultation           OUTSIDE LABS:  CBC:   Lab Results   Component Value Date    WBC 9.2 12/18/2022    WBC 8.4 07/15/2022    HGB 14.2 12/18/2022    HGB 13.7 07/15/2022    HCT 41.9 12/18/2022    HCT 40.9 07/15/2022     12/18/2022     07/15/2022     BMP:   Lab Results   Component Value Date     12/18/2022     07/15/2022    POTASSIUM 4.6 12/18/2022    POTASSIUM 4.4 07/15/2022    CHLORIDE 110 (H) 12/18/2022    CHLORIDE 112 (H) 07/15/2022    CO2 20 (L) 12/18/2022    CO2 19 (L) 07/15/2022    BUN 14 12/18/2022    BUN 19 07/15/2022    CR 0.77 12/18/2022    CR 0.97 07/15/2022     12/18/2022     07/15/2022     COAGS:   Lab Results   Component Value Date    PTT 30 08/12/2020    INR 0.99 08/12/2020     POC:   Lab Results   Component Value Date    HCG Negative 02/03/2023     HEPATIC:   Lab Results   Component Value Date    ALBUMIN 3.7 07/15/2022    PROTTOTAL 7.9 07/15/2022    ALT 57 (H) 07/15/2022    AST 37 07/15/2022    ALKPHOS 114 07/15/2022    BILITOTAL 0.3 07/15/2022     OTHER:   Lab Results   Component Value Date    BRIEN 9.3 12/18/2022    MAG 1.9 07/15/2022    TSH 5.16 (H) 12/18/2022    T4 1.00 12/18/2022       Anesthesia Plan    ASA Status:  3   NPO Status:  NPO Appropriate    Anesthesia Type: General.     - Airway: ETT   Induction: Intravenous, Propofol.   Maintenance: Balanced.        Consents    Anesthesia Plan(s) and associated risks, benefits, and realistic alternatives discussed. Questions answered and patient/representative(s) expressed understanding.    - Discussed:     - Discussed with:  Patient         Postoperative Care    Pain management: IV analgesics,  Peripheral nerve block (Single Shot), Multi-modal analgesia, Oral pain medications.   PONV prophylaxis: Ondansetron (or other 5HT-3), Dexamethasone or Solumedrol     Comments:                Chapin Garner MD

## 2023-02-13 NOTE — OR NURSING
Called Dr. Painter re: nausea medication for home. Left message on his cellphone and also at the clinic

## 2023-02-13 NOTE — INTERVAL H&P NOTE
"I have reviewed the surgical (or preoperative) H&P that is linked to this encounter, and examined the patient. There are no significant changes    Clinical Conditions Present on Arrival:  Clinically Significant Risk Factors Present on Admission                    # Severe Obesity: Estimated body mass index is 48.58 kg/m  as calculated from the following:    Height as of 2/3/23: 1.626 m (5' 4\").    Weight as of 2/3/23: 128.4 kg (283 lb).       "

## 2023-02-14 ENCOUNTER — HOSPITAL ENCOUNTER (EMERGENCY)
Facility: CLINIC | Age: 38
Discharge: HOME | End: 2023-02-14
Attending: EMERGENCY MEDICINE | Admitting: EMERGENCY MEDICINE
Payer: COMMERCIAL

## 2023-02-14 VITALS
TEMPERATURE: 98.3 F | BODY MASS INDEX: 47.8 KG/M2 | HEIGHT: 64 IN | OXYGEN SATURATION: 97 % | RESPIRATION RATE: 23 BRPM | SYSTOLIC BLOOD PRESSURE: 115 MMHG | WEIGHT: 280 LBS | HEART RATE: 88 BPM | DIASTOLIC BLOOD PRESSURE: 59 MMHG

## 2023-02-14 DIAGNOSIS — R07.9 CHEST PAIN, UNSPECIFIED TYPE: ICD-10-CM

## 2023-02-14 DIAGNOSIS — R00.2 PALPITATIONS: ICD-10-CM

## 2023-02-14 LAB
ANION GAP SERPL CALCULATED.3IONS-SCNC: 11 MMOL/L (ref 5–18)
ATRIAL RATE - MUSE: 96 BPM
BASOPHILS # BLD AUTO: 0 10E3/UL (ref 0–0.2)
BASOPHILS NFR BLD AUTO: 0 %
BUN SERPL-MCNC: 11 MG/DL (ref 8–22)
CALCIUM SERPL-MCNC: 9.6 MG/DL (ref 8.5–10.5)
CHLORIDE BLD-SCNC: 106 MMOL/L (ref 98–107)
CO2 SERPL-SCNC: 21 MMOL/L (ref 22–31)
CREAT SERPL-MCNC: 0.78 MG/DL (ref 0.6–1.1)
DIASTOLIC BLOOD PRESSURE - MUSE: NORMAL MMHG
EOSINOPHIL # BLD AUTO: 0 10E3/UL (ref 0–0.7)
EOSINOPHIL NFR BLD AUTO: 0 %
ERYTHROCYTE [DISTWIDTH] IN BLOOD BY AUTOMATED COUNT: 12.3 % (ref 10–15)
GFR SERPL CREATININE-BSD FRML MDRD: >90 ML/MIN/1.73M2
GLUCOSE BLD-MCNC: 131 MG/DL (ref 70–125)
HCT VFR BLD AUTO: 39.8 % (ref 35–47)
HGB BLD-MCNC: 13.5 G/DL (ref 11.7–15.7)
IMM GRANULOCYTES # BLD: 0.1 10E3/UL
IMM GRANULOCYTES NFR BLD: 1 %
INTERPRETATION ECG - MUSE: NORMAL
LYMPHOCYTES # BLD AUTO: 1.2 10E3/UL (ref 0.8–5.3)
LYMPHOCYTES NFR BLD AUTO: 10 %
MAGNESIUM SERPL-MCNC: 1.9 MG/DL (ref 1.8–2.6)
MCH RBC QN AUTO: 29.7 PG (ref 26.5–33)
MCHC RBC AUTO-ENTMCNC: 33.9 G/DL (ref 31.5–36.5)
MCV RBC AUTO: 88 FL (ref 78–100)
MONOCYTES # BLD AUTO: 0.6 10E3/UL (ref 0–1.3)
MONOCYTES NFR BLD AUTO: 5 %
NEUTROPHILS # BLD AUTO: 10 10E3/UL (ref 1.6–8.3)
NEUTROPHILS NFR BLD AUTO: 84 %
NRBC # BLD AUTO: 0 10E3/UL
NRBC BLD AUTO-RTO: 0 /100
P AXIS - MUSE: 66 DEGREES
PLATELET # BLD AUTO: 381 10E3/UL (ref 150–450)
POTASSIUM BLD-SCNC: 4.7 MMOL/L (ref 3.5–5)
PR INTERVAL - MUSE: 156 MS
QRS DURATION - MUSE: 90 MS
QT - MUSE: 358 MS
QTC - MUSE: 452 MS
R AXIS - MUSE: 62 DEGREES
RBC # BLD AUTO: 4.55 10E6/UL (ref 3.8–5.2)
SODIUM SERPL-SCNC: 138 MMOL/L (ref 136–145)
SYSTOLIC BLOOD PRESSURE - MUSE: NORMAL MMHG
T AXIS - MUSE: 54 DEGREES
TROPONIN I SERPL-MCNC: <0.01 NG/ML (ref 0–0.29)
TROPONIN I SERPL-MCNC: <0.01 NG/ML (ref 0–0.29)
VENTRICULAR RATE- MUSE: 96 BPM
WBC # BLD AUTO: 11.9 10E3/UL (ref 4–11)

## 2023-02-14 PROCEDURE — 36415 COLL VENOUS BLD VENIPUNCTURE: CPT | Performed by: EMERGENCY MEDICINE

## 2023-02-14 PROCEDURE — 96361 HYDRATE IV INFUSION ADD-ON: CPT

## 2023-02-14 PROCEDURE — 96374 THER/PROPH/DIAG INJ IV PUSH: CPT

## 2023-02-14 PROCEDURE — 85025 COMPLETE CBC W/AUTO DIFF WBC: CPT | Performed by: EMERGENCY MEDICINE

## 2023-02-14 PROCEDURE — 84484 ASSAY OF TROPONIN QUANT: CPT | Mod: 91 | Performed by: EMERGENCY MEDICINE

## 2023-02-14 PROCEDURE — 258N000003 HC RX IP 258 OP 636: Performed by: EMERGENCY MEDICINE

## 2023-02-14 PROCEDURE — 80048 BASIC METABOLIC PNL TOTAL CA: CPT | Performed by: EMERGENCY MEDICINE

## 2023-02-14 PROCEDURE — 93005 ELECTROCARDIOGRAM TRACING: CPT | Performed by: EMERGENCY MEDICINE

## 2023-02-14 PROCEDURE — 250N000011 HC RX IP 250 OP 636: Performed by: EMERGENCY MEDICINE

## 2023-02-14 PROCEDURE — 250N000013 HC RX MED GY IP 250 OP 250 PS 637: Performed by: EMERGENCY MEDICINE

## 2023-02-14 PROCEDURE — 99284 EMERGENCY DEPT VISIT MOD MDM: CPT | Mod: 25

## 2023-02-14 PROCEDURE — 83735 ASSAY OF MAGNESIUM: CPT | Performed by: EMERGENCY MEDICINE

## 2023-02-14 RX ORDER — ONDANSETRON 2 MG/ML
4 INJECTION INTRAMUSCULAR; INTRAVENOUS ONCE
Status: COMPLETED | OUTPATIENT
Start: 2023-02-14 | End: 2023-02-14

## 2023-02-14 RX ORDER — IBUPROFEN 600 MG/1
600 TABLET, FILM COATED ORAL ONCE
Status: COMPLETED | OUTPATIENT
Start: 2023-02-14 | End: 2023-02-14

## 2023-02-14 RX ORDER — ONDANSETRON 4 MG/1
4 TABLET, ORALLY DISINTEGRATING ORAL EVERY 8 HOURS PRN
Qty: 15 TABLET | Refills: 0 | Status: SHIPPED | OUTPATIENT
Start: 2023-02-14 | End: 2023-02-22

## 2023-02-14 RX ORDER — HYDROCODONE BITARTRATE AND ACETAMINOPHEN 5; 325 MG/1; MG/1
1 TABLET ORAL ONCE
Status: COMPLETED | OUTPATIENT
Start: 2023-02-14 | End: 2023-02-14

## 2023-02-14 RX ORDER — ONDANSETRON 4 MG/1
4-8 TABLET, ORALLY DISINTEGRATING ORAL EVERY 8 HOURS PRN
Qty: 20 TABLET | Refills: 0 | Status: SHIPPED | OUTPATIENT
Start: 2023-02-14 | End: 2023-02-17

## 2023-02-14 RX ADMIN — ONDANSETRON 4 MG: 2 INJECTION INTRAMUSCULAR; INTRAVENOUS at 03:49

## 2023-02-14 RX ADMIN — SODIUM CHLORIDE 1000 ML: 9 INJECTION, SOLUTION INTRAVENOUS at 03:55

## 2023-02-14 RX ADMIN — HYDROCODONE BITARTRATE AND ACETAMINOPHEN 1 TABLET: 5; 325 TABLET ORAL at 03:47

## 2023-02-14 RX ADMIN — IBUPROFEN 600 MG: 600 TABLET ORAL at 06:38

## 2023-02-14 ASSESSMENT — ACTIVITIES OF DAILY LIVING (ADL)
ADLS_ACUITY_SCORE: 35
ADLS_ACUITY_SCORE: 35

## 2023-02-14 ASSESSMENT — ENCOUNTER SYMPTOMS
LIGHT-HEADEDNESS: 1
PALPITATIONS: 1
CHEST TIGHTNESS: 1

## 2023-02-14 NOTE — ED NOTES
"Emergency Department transition of care note    Assumed care of patient from Rosie Gardner MD at 6:55 AM.    Cherie Montgomery is a 37 year old female who presented for evaluation of palpitations      The patient reports when they got home after surgery around 6:30 PM yesterday, they experienced a \"couple palpitations\" and had a \"bigger one\", but they resolved and the patient went to bed. The patient notes when they were falling asleep they, \"felt more arrhythmias\", but was able to sleep until 1 AM and when they awoke, their heart rate was \"consistently\" in the 140s-160s. The patient sat up and felt \"tightness\" in their chest, heartburn, and lightheadedness which were new symptoms for the patient so they got worried and came to the ED.    To Do: Delta trop pending.     Update: Serial troponin is negative.  Will discharge as per plan from Dr. Gardner.          Final Impression:  1. Palpitations    2. Chest pain, unspecified type        Hoa Munoz MD  Emergency Medicine  [facility] EMERGENCY ROOM  [facility] [facility] [address] [phone]     Hoa Munoz MD  02/14/23 0722    " The patient is a 75y Female complaining of abdominal pain.

## 2023-02-14 NOTE — ED PROVIDER NOTES
EMERGENCY DEPARTMENT ENCOUNTER      NAME: Cherie Montgomery  AGE: 37 year old female  YOB: 1985  MRN: [id number]  EVALUATION DATE & TIME: No admission date for patient encounter.    PCP: Katia Rm    ED PROVIDER: Rosie Gardner MD      Chief Complaint   Patient presents with     Palpitations         FINAL IMPRESSION:  1. Palpitations          ED COURSE & MEDICAL DECISION MAKING:    Pertinent Labs & Imaging studies reviewed. (See chart for details)  37 year old female presents to the Emergency Department for evaluation of palpitations.  She has a history of paroxysmal A-fib and states this felt similar.  She is postop excision of a calcaneal spur on the left foot that happened today.  She states that her episode today was different and that it was prolonged and she developed chest tightness and felt lightheaded.  She normally feels the palpitations but does not have any chest pain or lightheadedness.  At the time of presentation to the ED, her symptoms are improved, she is no longer feeling palpitations.  ECG demonstrates sinus rhythm with no arrhythmia.  Given her development of chest pain will obtain serial troponins.  She describes the pain as a tightness.  She denies any pleuritic pain.  She does not feel short of breath.  If laboratory studies and serial troponins are negative, the patient will be discharged home with recommendations to follow-up with cardiology.    3:14 AM I met with the patient to gather history and perform my exam. ED course and treatment discussed.   4:18 AM I updated the patient on negative troponin and work-up thus far.  Patient comfortable with repeat troponin in 3 hours and if negative follow-up with rapid access cardiology clinic.  Patient continues to be in sinus rhythm with no return to A-fib.    At the conclusion of the encounter I discussed the results of all of the tests and the disposition. The questions were answered. The patient or family acknowledged  understanding and was agreeable with the care plan.         Medical Decision Making    History:    Supplemental history from: Documented in chart, if applicable    External Record(s) reviewed: Documented in chart, if applicable. and Inpatient Record: Surgery from 2/13/23    Work Up:    Chart documentation includes differential considered and any EKGs or imaging independently interpreted by provider, where specified.    In additional to work up documented, I considered the following work up: Documented in chart, if applicable.    External consultation:    Discussion of management with another provider: Documented in chart, if applicable    Complicating factors:    Care impacted by chronic illness: Other: Paroxysmal Afib    Care affected by social determinants of health: N/A    Disposition considerations: Admission considered. Patient was signed out to the oncoming physician, disposition pending.          MEDICATIONS GIVEN IN THE EMERGENCY:  Medications   0.9% sodium chloride BOLUS (1,000 mLs Intravenous New Bag 2/14/23 8448)   HYDROcodone-acetaminophen (NORCO) 5-325 MG per tablet 1 tablet (1 tablet Oral Given 2/14/23 3167)   ondansetron (ZOFRAN) injection 4 mg (4 mg Intravenous Given 2/14/23 7990)       NEW PRESCRIPTIONS STARTED AT TODAY'S ER VISIT  New Prescriptions    No medications on file          =================================================================    HPI    Patient information was obtained from: Patient     Use of : N/A       Cherie Montgomery is a 37 year old female with a pertinent history of paroxysmal Afib, Antiphospholipid syndrome, and hypothyroidism who presents to this ED via walk-in for evaluation of palpitations     Per chart review: The patient had an excision of a retrocalcaneal spur of the left foot performed at [facility] yesterday. The patient had general anesthesia with a popliteal block the left lower extremity. The patient tolerated the procedure well.     The  "patient reports when they got home after surgery around 6:30 PM yesterday, they experienced a \"couple palpitations\" and had a \"bigger one\", but they resolved and the patient went to bed. The patient notes when they were falling asleep they, \"felt more arrhythmias\", but was able to sleep until 1 AM and when they awoke, their heart rate was \"consistently\" in the 140s-160s. The patient sat up and felt \"tightness\" in their chest, heartburn, and lightheadedness which were new symptoms for the patient so they got worried and came to the ED. The patient notes they have a history of Afib and in the past, their heart rate would, \"go up and down constantly\", would last \"for hours\", and would require cardioversion. The patient was recently prescribed verapamil and notes that since taking the medication, they have been able to flip out of Afib on their own and the episodes would end must faster. The patient notes right now they, \"don't feel like [they're] in Afib\". The patient is also notes they are nervous to take their prescribed opioids due to their Afib.     REVIEW OF SYSTEMS   Review of Systems   Respiratory: Positive for chest tightness.    Cardiovascular: Positive for palpitations.   Neurological: Positive for light-headedness.        PAST MEDICAL HISTORY:  Past Medical History:   Diagnosis Date     Anti-phospholipid antibody syndrome (H)      Atrial fibrillation (H)      Dysmenorrhea      Hypertension     with second full term pregnancy     Obese      Thyroid disease     hypothyroid       PAST SURGICAL HISTORY:  Past Surgical History:   Procedure Laterality Date     DILATION AND CURETTAGE N/A 4/18/2018    Procedure: SUCTION DILATION AND CURETTAGE;  Surgeon: Roxanna Moody MD;  Location: [facility];  Service:      DILATION AND CURETTAGE, OPERATIVE HYSTEROSCOPY WITH MORCELLATOR, COMBINED N/A 1/21/2019    Procedure: HYSTEROSCOPY, POLYPECTOMY, POSSIBLE DILATION AND CURETTAGE (POP Properties MORCELLATOR AND FLUID " "MANAGEMENT);  Surgeon: Katia Rm MD;  Location: [facility]     GYN SURGERY      D & C     TOE SURGERY             CURRENT MEDICATIONS:    aspirin 81 MG EC tablet  cephALEXin (KEFLEX) 500 MG capsule  HYDROcodone-acetaminophen (NORCO) 5-325 MG tablet  levothyroxine (SYNTHROID/LEVOTHROID) 50 MCG tablet  Prenatal Vit-Fe Fumarate-FA (PRENATAL PO)  verapamil ER (CALAN-SR) 120 MG CR tablet  Vitamin D3 (CHOLECALCIFEROL) 125 MCG (5000 UT) tablet        ALLERGIES:  No Known Allergies    FAMILY HISTORY:  Family History   Problem Relation Age of Onset     Bipolar Disorder Other      Depression Other      Alcoholism Other      Attention Deficit Disorder Other        SOCIAL HISTORY:   Social History     Socioeconomic History     Marital status:    Tobacco Use     Smoking status: Former     Types: Cigarettes     Quit date: 2018     Years since quittin.0     Passive exposure: Never     Smokeless tobacco: Never   Vaping Use     Vaping Use: Never used   Substance and Sexual Activity     Alcohol use: Not Currently     Comment: 2/month     Drug use: Never     Sexual activity: Yes     Partners: Male       VITALS:  /84   Pulse 94   Temp (!) 96.1  F (35.6  C) (Tympanic)   Resp 19   Ht 1.626 m (5' 4\")   Wt 127 kg (280 lb)   LMP 2023 (Exact Date)   SpO2 96%   BMI 48.06 kg/m      PHYSICAL EXAM    Constitutional: Well developed, Well nourished, NAD  HENT: Normocephalic, Atraumatic, Bilateral external ears normal, Oropharynx normal, mucous membranes moist, Nose normal.   Neck- Normal range of motion, No tenderness, Supple, No stridor.  Eyes: PERRL, EOMI, Conjunctiva normal, No discharge.   Respiratory: Normal breath sounds, No respiratory distress  Cardiovascular: Normal heart rate, Regular rhythm  GI: Bowel sounds normal, Soft, No tenderness,   Musculoskeletal: No edema. Good range of motion in all major joints. No tenderness to palpation or major deformities noted. Left lower extremity in a walking " boot    Integument: Warm, Dry, No erythema, No rash  Neurologic: Alert & oriented x 3, Normal motor function, Normal sensory function, No focal deficits noted. Normal gait.   Psychiatric: Affect normal, Judgment normal, Mood normal.      LAB:  All pertinent labs reviewed and interpreted.  Results for orders placed or performed during the hospital encounter of 02/14/23   Basic metabolic panel   Result Value Ref Range    Sodium 138 136 - 145 mmol/L    Potassium 4.7 3.5 - 5.0 mmol/L    Chloride 106 98 - 107 mmol/L    Carbon Dioxide (CO2) 21 (L) 22 - 31 mmol/L    Anion Gap 11 5 - 18 mmol/L    Urea Nitrogen 11 8 - 22 mg/dL    Creatinine 0.78 0.60 - 1.10 mg/dL    Calcium 9.6 8.5 - 10.5 mg/dL    Glucose 131 (H) 70 - 125 mg/dL    GFR Estimate >90 >60 mL/min/1.73m2   Result Value Ref Range    Magnesium 1.9 1.8 - 2.6 mg/dL   CBC with platelets and differential   Result Value Ref Range    WBC Count 11.9 (H) 4.0 - 11.0 10e3/uL    RBC Count 4.55 3.80 - 5.20 10e6/uL    Hemoglobin 13.5 11.7 - 15.7 g/dL    Hematocrit 39.8 35.0 - 47.0 %    MCV 88 78 - 100 fL    MCH 29.7 26.5 - 33.0 pg    MCHC 33.9 31.5 - 36.5 g/dL    RDW 12.3 10.0 - 15.0 %    Platelet Count 381 150 - 450 10e3/uL    % Neutrophils 84 %    % Lymphocytes 10 %    % Monocytes 5 %    % Eosinophils 0 %    % Basophils 0 %    % Immature Granulocytes 1 %    NRBCs per 100 WBC 0 <1 /100    Absolute Neutrophils 10.0 (H) 1.6 - 8.3 10e3/uL    Absolute Lymphocytes 1.2 0.8 - 5.3 10e3/uL    Absolute Monocytes 0.6 0.0 - 1.3 10e3/uL    Absolute Eosinophils 0.0 0.0 - 0.7 10e3/uL    Absolute Basophils 0.0 0.0 - 0.2 10e3/uL    Absolute Immature Granulocytes 0.1 <=0.4 10e3/uL    Absolute NRBCs 0.0 10e3/uL       RADIOLOGY:  Reviewed all pertinent imaging. Please see official radiology report.  No orders to display       EKG:    Performed at: 2/14/23 at 3:06 AM    Impression: Normal EKG    Rate: 96  Rhythm: Sinus    RI Interval: 156  QRS Interval: 90  QTc Interval: 452  ST Changes:  None  Comparison: When compared to EKG from 12/18/2022- no significant change was found       I have independently reviewed and interpreted the EKG(s) documented above.      I, Priscilla Sandoval, am serving as a scribe to document services personally performed by Rosie Gardner, based on my observation and the provider's statements to me. I, Rosie Gardner MD, attest that Priscilla Sandoval is acting in a scribe capacity, has observed my performance of the services and has documented them in accordance with my direction.    Rosie Gardner MD  Emergency Medicine  [facility] EMERGENCY ROOM  [facility] [facility] [address] [phone]     Rosie Gardner MD  02/14/23 0628

## 2023-02-14 NOTE — ED TRIAGE NOTES
In and out of afib since approx 2200 tonight accompanied with chest tightness and lightheadedness. Worse with lying down. Surgery yesterday on LLE.  in triage area.

## 2023-02-15 ENCOUNTER — TELEPHONE (OUTPATIENT)
Dept: PODIATRY | Facility: CLINIC | Age: 38
End: 2023-02-15
Payer: COMMERCIAL

## 2023-02-15 NOTE — TELEPHONE ENCOUNTER
02/15/23  Pt had surgery 02/13/23 at [facility] by Dr. Painter and has questions for the care team as no one has reached out to follow up with her.  1. She was also told to keep the boot on at all times. But also told to expose it to air to reduce skin irritation. Pt is concerned about skin.  2. Should pt put ice on injury? She states she has been putting ice around the boot, but it does not seem to get to the area. Can she take off the boot to ice?  Please call.

## 2023-02-21 ENCOUNTER — ANCILLARY PROCEDURE (OUTPATIENT)
Dept: GENERAL RADIOLOGY | Facility: CLINIC | Age: 38
End: 2023-02-21
Attending: PODIATRIST
Payer: COMMERCIAL

## 2023-02-21 ENCOUNTER — OFFICE VISIT (OUTPATIENT)
Dept: PODIATRY | Facility: CLINIC | Age: 38
End: 2023-02-21
Payer: COMMERCIAL

## 2023-02-21 VITALS — RESPIRATION RATE: 16 BRPM | HEART RATE: 80 BPM

## 2023-02-21 DIAGNOSIS — M77.32 BONE SPUR OF POSTERIOR PORTION OF LEFT CALCANEUS: ICD-10-CM

## 2023-02-21 DIAGNOSIS — M79.672 LEFT FOOT PAIN: Primary | ICD-10-CM

## 2023-02-21 DIAGNOSIS — M77.32 BONE SPUR OF POSTERIOR PORTION OF LEFT CALCANEUS: Primary | ICD-10-CM

## 2023-02-21 PROCEDURE — 99024 POSTOP FOLLOW-UP VISIT: CPT | Performed by: PODIATRIST

## 2023-02-21 PROCEDURE — 73630 X-RAY EXAM OF FOOT: CPT | Mod: LT | Performed by: PODIATRIST

## 2023-02-21 ASSESSMENT — PAIN SCALES - GENERAL: PAINLEVEL: NO PAIN (0)

## 2023-02-21 NOTE — PROGRESS NOTES
Podiatry Progress Note        ASSESSMENT:   S/P excision retrocalcaneal spur with repair of Achilles tendon left  Dermatitis left leg      TREATMENT:  -Surgical site on the left heel is progressing well.  I reviewed the patient's x-rays which indicate resection of the Taisha's deformity.    -There is mild erythematous patches anterior left leg.  Recommend application of hydrocortisone cream twice daily.    -Gauze dressing applied today which she will keep intact. Continue non-weight bearing on the left foot foot.  Cam boot for stability.    -She will follow-up with Dr. Painter next week.     Edi Larose DPM  [facility] Podiatry/Foot & Ankle Surgery      HPI: Cherie Montgomery was seen again today in the absence of Dr. Painter.  Patient underwent excision retrocalcaneal spur with repair of Achilles tendon left with Dr. Painter on February 13.  She has remained nonweightbearing on the left foot with a knee walker and crutches.  She does admit to having some itching on her left leg which has been intense at times.    Past Medical History:   Diagnosis Date     Anti-phospholipid antibody syndrome (H)      Atrial fibrillation (H)      Dysmenorrhea      Hypertension     with second full term pregnancy     Obese      Thyroid disease     hypothyroid       No Known Allergies      Current Outpatient Medications:      aspirin 81 MG EC tablet, Take 81 mg by mouth daily, Disp: , Rfl:      HYDROcodone-acetaminophen (NORCO) 5-325 MG tablet, Take 1-2 tablets by mouth every 6 hours as needed for moderate to severe pain, Disp: 20 tablet, Rfl: 0     levothyroxine (SYNTHROID/LEVOTHROID) 50 MCG tablet, Take 50 mcg by mouth daily, Disp: , Rfl:      ondansetron (ZOFRAN ODT) 4 MG ODT tab, Take 1 tablet (4 mg) by mouth every 8 hours as needed for nausea, Disp: 15 tablet, Rfl: 0     Prenatal Vit-Fe Fumarate-FA (PRENATAL PO), Take 1 tablet by mouth daily, Disp: , Rfl:      verapamil ER (CALAN-SR) 120 MG CR tablet, Take 1 tablet (120 mg)  by mouth At Bedtime, Disp: 90 tablet, Rfl: 3     Vitamin D3 (CHOLECALCIFEROL) 125 MCG (5000 UT) tablet, Take 1 tablet by mouth daily, Disp: , Rfl:     Review of Systems - Negative       OBJECTIVE:  Appearance: alert, well appearing, and in no distress.    Pulse 80   Resp 16   LMP 02/02/2023 (Exact Date)     No images are attached to the encounter.     Surgical site on the posterior left heel has intact Steri-Strips with skin edges well approximated, no gapping noted. No erythema left foot. Neurovascular status unchanged left foot.  Erythematous patches noted anterior left leg.

## 2023-02-21 NOTE — PATIENT INSTRUCTIONS
You are to remain non-weight bearing until your next appointment.    Keep gauze dressing in place until your follow up appointment.

## 2023-02-21 NOTE — LETTER
After Visit Summary   10/10/2017    Truman Suero    MRN: [id number]           Patient Information     Date Of Birth          1959        Visit Information        Provider Department      10/10/2017 1:00 PM Augusta Price APRN CNP [facility] [place] [facility]        Today's Diagnoses     Cervical radicular pain    -  1      Care Instructions    1.  EMG of both arms with Dr. Perez. I will call you with results.       2.  Please contact the clinic if pain persists at [phone].           Follow-ups after your visit        Your next 10 appointments already scheduled     Nov 06, 2017  3:00 PM CST   Return Visit with RODNEY Vargas CNP   [facility] ([facility])    [address]  [address]  [facility] [address] [phone]              Who to contact     If you have questions or need follow up information about today's clinic visit or your schedule please contact [facility] NEUROSURG[facility] directly at [phone].  Normal or non-critical lab and imaging results will be communicated to you by ClassBughart, letter or phone within 4 business days after the clinic has received the results. If you do not hear from us within 7 days, please contact the clinic through ClassBughart or phone. If you have a critical or abnormal lab result, we will notify you by phone as soon as possible.  Submit refill requests through Popcuts or call your pharmacy and they will forward the refill request to us. Please allow 3 business days for your refill to be completed.          Additional Information About Your Visit        ClassBughart Information     Popcuts gives you secure access to your electronic health record. If you see a primary care provider, you can also send messages to your care team and make appointments. If you have questions, please call your primary care clinic.  If you do not have a primary care provider, please call [phone] and they      2/21/2023         RE: Cherie Montgomery  [address]  [place] [facility] [place] [address]        Dear Colleague,    Thank you for referring your patient, Cherie Montgomery, to the [facility]. Please see a copy of my visit note below.    Podiatry Progress Note        ASSESSMENT:   S/P excision retrocalcaneal spur with repair of Achilles tendon left  Dermatitis left leg      TREATMENT:  -Surgical site on the left heel is progressing well.  I reviewed the patient's x-rays which indicate resection of the Taisha's deformity.    -There is mild erythematous patches anterior left leg.  Recommend application of hydrocortisone cream twice daily.    -Gauze dressing applied today which she will keep intact. Continue non-weight bearing on the left foot foot.  Cam boot for stability.    -She will follow-up with Dr. Painter next week.     Edi Larose, GERALDINE  [facility]/Foot [facility]le [facility]      HPI: Cherie Montgomery was seen again today in the absence of Dr. Painter.  Patient underwent excision retrocalcaneal spur with repair of Achilles tendon left with Dr. Painter on February 13.  She has remained nonweightbearing on the left foot with a knee walker and crutches.  She does admit to having some itching on her left leg which has been intense at times.    Past Medical History:   Diagnosis Date     Anti-phospholipid antibody syndrome (H)      Atrial fibrillation (H)      Dysmenorrhea      Hypertension     with second full term pregnancy     Obese      Thyroid disease     hypothyroid       No Known Allergies      Current Outpatient Medications:      aspirin 81 MG EC tablet, Take 81 mg by mouth daily, Disp: , Rfl:      HYDROcodone-acetaminophen (NORCO) 5-325 MG tablet, Take 1-2 tablets by mouth every 6 hours as needed for moderate to severe pain, Disp: 20 tablet, Rfl: 0     levothyroxine (SYNTHROID/LEVOTHROID) 50 MCG tablet, Take 50 mcg by mouth daily, Disp: , Rfl:      ondansetron (ZOFRAN  "will assist you.        Care EveryWhere ID     This is your Care EveryWhere ID. This could be used by other organizations to access your [place] medical records  [id number]        Your Vitals Were     Pulse Height Pulse Oximetry BMI (Body Mass Index)          53 5' 6.34\" (1.685 m) 99% 22.85 kg/m2         Blood Pressure from Last 3 Encounters:   10/10/17 136/88   10/02/17 132/80   09/11/17 122/80    Weight from Last 3 Encounters:   10/10/17 143 lb (64.9 kg)   08/16/17 138 lb (62.6 kg)   08/07/17 137 lb (62.1 kg)              We Performed the Following     NEEDLE EMG 2 EXTREMITIES        Primary Care Provider Office Phone # Fax #    [facility] [phone]       [address] [address]        Equal Access to Services     ELIEZER VALENTE : Hadii aad ku hadasho Soomaali, waaxda luqadaha, qaybta kaalmada adeegyada, mariela garduno . So [facility] [phone].    ATENCIÓN: Si habla español, tiene a simmons disposición servicios gratuitos de asistencia lingüística. Llame al [phone].    We comply with applicable federal civil rights laws and [place] laws. We do not discriminate on the basis of race, color, national origin, age, disability, sex, sexual orientation, or gender identity.            Thank you!     Thank you for choosing [facility] NEUROSURGERY [facility]  for your care. Our goal is always to provide you with excellent care. Hearing back from our patients is one way we can continue to improve our services. Please take a few minutes to complete the written survey that you may receive in the mail after your visit with us. Thank you!             Your Updated Medication List - Protect others around you: Learn how to safely use, store and throw away your medicines at www.disposemymeds.org.          This list is accurate as of: 10/10/17  1:07 PM.  Always use your most recent med list.                   Brand Name Dispense Instructions for use " ODT) 4 MG ODT tab, Take 1 tablet (4 mg) by mouth every 8 hours as needed for nausea, Disp: 15 tablet, Rfl: 0     Prenatal Vit-Fe Fumarate-FA (PRENATAL PO), Take 1 tablet by mouth daily, Disp: , Rfl:      verapamil ER (CALAN-SR) 120 MG CR tablet, Take 1 tablet (120 mg) by mouth At Bedtime, Disp: 90 tablet, Rfl: 3     Vitamin D3 (CHOLECALCIFEROL) 125 MCG (5000 UT) tablet, Take 1 tablet by mouth daily, Disp: , Rfl:     Review of Systems - Negative       OBJECTIVE:  Appearance: alert, well appearing, and in no distress.    Pulse 80   Resp 16   LMP 02/02/2023 (Exact Date)     No images are attached to the encounter.     Surgical site on the posterior left heel has intact Steri-Strips with skin edges well approximated, no gapping noted. No erythema left foot. Neurovascular status unchanged left foot.  Erythematous patches noted anterior left leg.        Again, thank you for allowing me to participate in the care of your patient.        Sincerely,        Edi Larose DPM     Diagnosis    gabapentin 600 MG tablet    NEURONTIN    135 tablet    Take 1.5 tablets (900 mg) by mouth 3 times daily    Cervical spondylosis without myelopathy, DDD (degenerative disc disease), cervical, Chronic bilateral low back pain without sciatica, Chronic neck and back pain, Cervical stenosis of spinal canal       ibuprofen 800 MG tablet    ADVIL/MOTRIN          methocarbamol 500 MG tablet    ROBAXIN    150 tablet    Take 1-2 tablets (500-1,000 mg) by mouth 3 times daily as needed for muscle spasms Start with lower dose and caution sedation    Chronic neck pain, DDD (degenerative disc disease), cervical, Cervical spondylosis without myelopathy, Chronic bilateral low back pain without sciatica, Myofascial pain       * traMADol 50 MG tablet    ULTRAM    90 tablet    May take 1-2 tablets every 6 hours as needed for pain, max of 3 tabs per day. To last 30 days. Reduce as able.  Dispense on/after 10/2/17.  To start on 10/3/17    Chronic neck and back pain, Cervical radiculopathy       * traMADol 200 MG ER-tablet    ULTRAM-ER    23 tablet    Take 1 tablet (200 mg) by mouth daily OK to fill and begin on 10/9/2017 and start on 10/11/2017 to until 11/2/2017 (23 day script so Tramadol ER and tramadol immediate release are due on the same day in the future)    Cervical spondylosis without myelopathy, DDD (degenerative disc disease), cervical, Chronic bilateral low back pain without sciatica       varenicline 0.5 MG X 11 & 1 MG X 42 tablet    CHANTIX STARTING MONTH GIO    53 tablet    Take 0.5 mg tab daily for 3 days, then 0.5 mg tab twice daily for 4 days, then 1 mg twice daily.    Tobacco abuse       * Notice:  This list has 2 medication(s) that are the same as other medications prescribed for you. Read the directions carefully, and ask your doctor or other care provider to review them with you.

## 2023-02-22 ENCOUNTER — VIRTUAL VISIT (OUTPATIENT)
Dept: CARDIOLOGY | Facility: CLINIC | Age: 38
End: 2023-02-22
Payer: COMMERCIAL

## 2023-02-22 DIAGNOSIS — I48.0 PAROXYSMAL ATRIAL FIBRILLATION (H): Primary | ICD-10-CM

## 2023-02-22 PROCEDURE — 99214 OFFICE O/P EST MOD 30 MIN: CPT | Mod: VID | Performed by: INTERNAL MEDICINE

## 2023-02-22 NOTE — PROGRESS NOTES
[facility]  Cardiac Electrophysiology  [address]  [place] [address]   Office: [phone]  Fax: [phone]     Cardiac Electrophysiology Follow-up Visit    Patient: Cherie Montgomery   : 1985     Referring Provider: Saran Vargas MD  Primary Care Provider: Katia Rm MD    CHIEF COMPLAINT/REASON FOR VISIT  Paroxysmal atrial fibrillation  Related to the ongoing SARS-CoV-2 pandemic, Mrs. Montgomery requested a telehealth visit, and verbally confirmed her choice to initiate care by, and consents to receive care by, telehealth platform.    Assessment/Recommendations   Cherie Montgomery is a 37 year old female with paroxysmal atrial fibrillation, APLAS, hypothyroidism, obesity presenting for follow-up regarding atrial fibrillation.    Paroxysmal atrial fibrillation - symptomatic with palpitations  [id number] 0-1  We reviewed atrial fibrillation physiology and management considerations including managing stroke risk, rate control, cardioversion, antiarrhythmic drug therapy, and catheter ablation.  We discussed atrial fibrillation ablation procedures, anticipated success rates, the potential need for re-do ablation vs addition of anti-arrhythmic drugs, procedural risks (including groin bleeding, tamponade, phrenic or esophageal injury, stroke, pulmonary vein stenosis) and recovery expectations.  She would prefer to continue verapamil and work on diet and lifestyle for now, with further consideration for antiarrhythmic drug therapy vs catheter ablation at time of increased/longer AF episodes.  We discussed the benefits of early intervention to limit AF progression  - follow up Zio results when available - we will query the status of the results  - continue verapamil SR 120mg daily for now  - we discussed the ongoing importance of lifestyle modification (maintaining a healthy weight, sleep apnea diagnosis and management, alcohol avoidance) as part of a long term  strategy for atrial fibrillation management  - sleep medicine consultation previously ordered  - she is motivate towards working on lifestyle and aerobic activity    Follow up: EP follow-up in 1 year, sooner if needed         History of Present Illness   Cherie Montgomery is a 37 year old female with paroxysmal atrial fibrillation, APLAS, hypothyroidism, obesity presenting for follow-up regarding atrial fibrillation.    Mrs. Montgomery notes a first episode of atrial fibrillation around 2021, a second episode 7/15/2022, and a third episode 12/18/2022 - she has needed cardioversion 7/15/2022 and 12/18/2022.  Her longest episode to date prior to DCCV was ~6hrs.  All occurrences have occurred at night and awaken her from sleep.  She Zio monitoring - she submitted this in early 2/2023, results are not available - she notes brief episodes of palpitations while wearing the monitor, though none of her more severe or longer episodes.  She had fairly infrequent self-limited atrial fibrillation episodes which resolved by the time of ER evaluation.    She is presently recovering from foot surgery - she will be non-weight bearing for 30d.  She denies chest pain, syncope.  She has two young children.  She works in a salon.       Physical Examination  Review of Systems   Not performed - telehealth Constitutional:  No weight loss or loss of appetite    Eyes:  No difficulty with vision, no double vision, no dry eyes  ENT:  No sore throat, difficulty swallowing; changes in hearing or tinnitus  Cardiovascular: As detailed above  Respiratory:  No cough  Musculoskeletal  No joint pain, muscle aches  Neurologic:  No syncope, lightheadedness, fainting spells   Hematologic: No easy bruising, excessive bleeding tendency   Gastrointestinal:  No jaundice, abdominal pain or abdominal bloating  Genitourinary: No changes in urinary habits, no trouble urinating    Psychiatric: No anxiety or depression      Medical History  Surgical History   Past  Medical History:   Diagnosis Date     Anti-phospholipid antibody syndrome (H)      Atrial fibrillation (H)      Dysmenorrhea      Hypertension     with second full term pregnancy     Obese      Thyroid disease     hypothyroid    Past Surgical History:   Procedure Laterality Date     DILATION AND CURETTAGE N/A 2018    Procedure: SUCTION DILATION AND CURETTAGE;  Surgeon: Roxanna Moody MD;  Location: [facility];  Service:      DILATION AND CURETTAGE, OPERATIVE HYSTEROSCOPY WITH MORCELLATOR, COMBINED N/A 2019    Procedure: HYSTEROSCOPY, POLYPECTOMY, POSSIBLE DILATION AND CURETTAGE (GUY NEPHOmedix MORCELLATOR AND FLUID MANAGEMENT);  Surgeon: Katia Rm MD;  Location:  OR     EXCISION, EXOSTOSIS, RETROCALCANEAL Left 2023    Procedure: Excision retrocalcaneal spur left foot;  Surgeon: Jmoar Painter DPM;  Location: [facility]     GYN SURGERY      D & C     TOE SURGERY           Family History Social History   Family History   Problem Relation Age of Onset     Bipolar Disorder Other      Depression Other      Alcoholism Other      Attention Deficit Disorder Other         Social History     Tobacco Use     Smoking status: Former     Types: Cigarettes     Quit date: 2018     Years since quittin.0     Passive exposure: Never     Smokeless tobacco: Never   Vaping Use     Vaping Use: Never used   Substance Use Topics     Alcohol use: Not Currently     Comment: 2/month     Drug use: Never         Medications  Allergies     Current Outpatient Medications:      aspirin 81 MG EC tablet, Take 81 mg by mouth daily, Disp: , Rfl:      HYDROcodone-acetaminophen (NORCO) 5-325 MG tablet, Take 1-2 tablets by mouth every 6 hours as needed for moderate to severe pain, Disp: 20 tablet, Rfl: 0     levothyroxine (SYNTHROID/LEVOTHROID) 50 MCG tablet, Take 50 mcg by mouth daily, Disp: , Rfl:      ondansetron (ZOFRAN ODT) 4 MG ODT tab, Take 1 tablet (4 mg) by mouth every 8 hours as needed for  nausea, Disp: 15 tablet, Rfl: 0     Prenatal Vit-Fe Fumarate-FA (PRENATAL PO), Take 1 tablet by mouth daily, Disp: , Rfl:      verapamil ER (CALAN-SR) 120 MG CR tablet, Take 1 tablet (120 mg) by mouth At Bedtime, Disp: 90 tablet, Rfl: 3     Vitamin D3 (CHOLECALCIFEROL) 125 MCG (5000 UT) tablet, Take 1 tablet by mouth daily, Disp: , Rfl:    No Known Allergies       Lab Results    Chemistry CBC Cardiac Enzymes/BNP/TSH/INR   Recent Labs   Lab Test 12/18/22  0824      POTASSIUM 4.6   CHLORIDE 110*   CO2 20*      BUN 14   CR 0.77   GFRESTIMATED >90   BRIEN 9.3     Recent Labs   Lab Test 12/18/22  0824 07/15/22  0219 04/19/22  1117   CR 0.77 0.97 0.61  0.62          Recent Labs   Lab Test 12/18/22  0824   WBC 9.2   HGB 14.2   HCT 41.9   MCV 87        Recent Labs   Lab Test 12/18/22  0824 07/15/22  0219 04/21/22  0030   HGB 14.2 13.7 11.4*    Recent Labs   Lab Test 07/15/22  0219   TROPONINI <0.01     No results for input(s): BNP, NTBNPI, NTBNP in the last 77019 hours.  Recent Labs   Lab Test 12/18/22  0824   TSH 5.16*     Recent Labs   Lab Test 08/12/20  1106   INR 0.99         Data Review    ECGs (tracings independently reviewed)  12/18/2022 - SR 76bpm  12/18/2022 - AF, ventricular rate 157bpm  7/15/2022 - AF, ventricular rate 150bpm    Cardiac event monitoring from 7/25/2022 to 8/3/2022 (monitored duration 3d 22h 38m) (independently reviewed)  Baseline rhythm was sinus rhythm 78bpm.    Reported heart rate range 50 to 120bpm, average 74bpm.  2 symptom triggers (other, palpitations) correlated to sinus rhythm 69-87bpm.  No nonsustained or sustained tachyarrhythmias.  No atrial fibrillation.  There were no pauses noted.  Supraventricular and ventricular ectopic beat frequency are not reported on this monitoring modality.      6/5/2021 TTE  Visually Estimated EF: 60-65%   Normal LV size and systolic function.   Normal RV size and systolic function.   No significant valvular heart disease noted.  "    1/24/2023 MPI     The nuclear stress test is negative for inducible myocardial ischemia or infarction.     The left ventricular ejection fraction at stress is 70%.     The patient is at a low risk of future cardiac ischemic events.     The  images demonstrate breast attenuation.     There is no prior study for comparison.       Service was provided face-to-face with the patient via interactive videoconferencing  This exam was initially conducted via a secure 256-bit AES encrypted bidirectional video session.   Video start time 0824   Video end time 0848  Total time (in minutes) including non face-to-face time (reviewing records, documentation, etc..) 35    You have chosen to receive care through the use of telemedicine. Telemedicine enables health care providers at different locations to provide safe, effective and convenient care through the use of technology. As with any health care service, there are risks associated with the use of telemedicine, including equipment failure, poor image resolution and  issues.  Do you understand the risks and benefits of telemedicine as I have explained them to you? \"Yes\"  Have your questions regarding telemedicine been answered? \"Yes\"  Patient is currently at home.  Do you consent to the use of telemedicine in your medical care today?  Yes.   Last question, I need to confirm where are you physically located right now?  Answer: Patient confirms they are located in a state where I, Shira Lowe MD  am licensed.      Cc: Saran Vargas MD, Katia Rm MD Amila Dilusha William, MD  2/22/2023  8:28 AM    "

## 2023-02-22 NOTE — LETTER
2023    Katia Rm MD  [address]  [place] [address]    RE: Cherie Montgomery       Dear Colleague,     I had the pleasure of seeing Cherie Montgomery in the [facility].     [facility]  Cardiac Electrophysiology  [address]  [place] [address]   Office: [phone]  Fax: [phone]     Cardiac Electrophysiology Follow-up Visit    Patient: Cherie Montgomery   : 1985     Referring Provider: Saran Vargas MD  Primary Care Provider: Katia Rm MD    CHIEF COMPLAINT/REASON FOR VISIT  Paroxysmal atrial fibrillation  Related to the ongoing SARS-CoV-2 pandemic, Mrs. Montgomery requested a telehealth visit, and verbally confirmed her choice to initiate care by, and consents to receive care by, telehealth platform.    Assessment/Recommendations   Cherie Montgomery is a 37 year old female with paroxysmal atrial fibrillation, APLAS, hypothyroidism, obesity presenting for follow-up regarding atrial fibrillation.    Paroxysmal atrial fibrillation - symptomatic with palpitations  [id number] 0-1  We reviewed atrial fibrillation physiology and management considerations including managing stroke risk, rate control, cardioversion, antiarrhythmic drug therapy, and catheter ablation.  We discussed atrial fibrillation ablation procedures, anticipated success rates, the potential need for re-do ablation vs addition of anti-arrhythmic drugs, procedural risks (including groin bleeding, tamponade, phrenic or esophageal injury, stroke, pulmonary vein stenosis) and recovery expectations.  She would prefer to continue verapamil and work on diet and lifestyle for now, with further consideration for antiarrhythmic drug therapy vs catheter ablation at time of increased/longer AF episodes.  We discussed the benefits of early intervention to limit AF progression  - follow up Zio results when available - we will query the status of the results  - continue  verapamil SR 120mg daily for now  - we discussed the ongoing importance of lifestyle modification (maintaining a healthy weight, sleep apnea diagnosis and management, alcohol avoidance) as part of a long term strategy for atrial fibrillation management  - sleep medicine consultation previously ordered  - she is motivate towards working on lifestyle and aerobic activity    Follow up: EP follow-up in 1 year, sooner if needed         History of Present Illness   Cherie Montgomery is a 37 year old female with paroxysmal atrial fibrillation, APLAS, hypothyroidism, obesity presenting for follow-up regarding atrial fibrillation.    Mrs. Montgomery notes a first episode of atrial fibrillation around 2021, a second episode 7/15/2022, and a third episode 12/18/2022 - she has needed cardioversion 7/15/2022 and 12/18/2022.  Her longest episode to date prior to DCCV was ~6hrs.  All occurrences have occurred at night and awaken her from sleep.  She Zio monitoring - she submitted this in early 2/2023, results are not available - she notes brief episodes of palpitations while wearing the monitor, though none of her more severe or longer episodes.  She had fairly infrequent self-limited atrial fibrillation episodes which resolved by the time of ER evaluation.    She is presently recovering from foot surgery - she will be non-weight bearing for 30d.  She denies chest pain, syncope.  She has two young children.  She works in a salon.       Physical Examination  Review of Systems   Not performed - telehealth Constitutional:  No weight loss or loss of appetite    Eyes:  No difficulty with vision, no double vision, no dry eyes  ENT:  No sore throat, difficulty swallowing; changes in hearing or tinnitus  Cardiovascular: As detailed above  Respiratory:  No cough  Musculoskeletal  No joint pain, muscle aches  Neurologic:  No syncope, lightheadedness, fainting spells   Hematologic: No easy bruising, excessive bleeding tendency   Gastrointestinal:   No jaundice, abdominal pain or abdominal bloating  Genitourinary: No changes in urinary habits, no trouble urinating    Psychiatric: No anxiety or depression      Medical History  Surgical History   Past Medical History:   Diagnosis Date     Anti-phospholipid antibody syndrome (H)      Atrial fibrillation (H)      Dysmenorrhea      Hypertension     with second full term pregnancy     Obese      Thyroid disease     hypothyroid    Past Surgical History:   Procedure Laterality Date     DILATION AND CURETTAGE N/A 2018    Procedure: SUCTION DILATION AND CURETTAGE;  Surgeon: Roxanna Moody MD;  Location: [facility];  Service:      DILATION AND CURETTAGE, OPERATIVE HYSTEROSCOPY WITH MORCELLATOR, COMBINED N/A 2019    Procedure: HYSTEROSCOPY, POLYPECTOMY, POSSIBLE DILATION AND CURETTAGE (Volly MORCELLATOR AND FLUID MANAGEMENT);  Surgeon: Katia Rm MD;  Location:  OR     EXCISION, EXOSTOSIS, RETROCALCANEAL Left 2023    Procedure: Excision retrocalcaneal spur left foot;  Surgeon: Jomar Painter DPM;  Location: [facility]     GYN SURGERY      D & C     TOE SURGERY           Family History Social History   Family History   Problem Relation Age of Onset     Bipolar Disorder Other      Depression Other      Alcoholism Other      Attention Deficit Disorder Other         Social History     Tobacco Use     Smoking status: Former     Types: Cigarettes     Quit date: 2018     Years since quittin.0     Passive exposure: Never     Smokeless tobacco: Never   Vaping Use     Vaping Use: Never used   Substance Use Topics     Alcohol use: Not Currently     Comment: 2/month     Drug use: Never         Medications  Allergies     Current Outpatient Medications:      aspirin 81 MG EC tablet, Take 81 mg by mouth daily, Disp: , Rfl:      HYDROcodone-acetaminophen (NORCO) 5-325 MG tablet, Take 1-2 tablets by mouth every 6 hours as needed for moderate to severe pain, Disp: 20 tablet, Rfl:  0     levothyroxine (SYNTHROID/LEVOTHROID) 50 MCG tablet, Take 50 mcg by mouth daily, Disp: , Rfl:      ondansetron (ZOFRAN ODT) 4 MG ODT tab, Take 1 tablet (4 mg) by mouth every 8 hours as needed for nausea, Disp: 15 tablet, Rfl: 0     Prenatal Vit-Fe Fumarate-FA (PRENATAL PO), Take 1 tablet by mouth daily, Disp: , Rfl:      verapamil ER (CALAN-SR) 120 MG CR tablet, Take 1 tablet (120 mg) by mouth At Bedtime, Disp: 90 tablet, Rfl: 3     Vitamin D3 (CHOLECALCIFEROL) 125 MCG (5000 UT) tablet, Take 1 tablet by mouth daily, Disp: , Rfl:    No Known Allergies       Lab Results    Chemistry CBC Cardiac Enzymes/BNP/TSH/INR   Recent Labs   Lab Test 12/18/22  0824      POTASSIUM 4.6   CHLORIDE 110*   CO2 20*      BUN 14   CR 0.77   GFRESTIMATED >90   BRIEN 9.3     Recent Labs   Lab Test 12/18/22  0824 07/15/22  0219 04/19/22  1117   CR 0.77 0.97 0.61  0.62          Recent Labs   Lab Test 12/18/22  0824   WBC 9.2   HGB 14.2   HCT 41.9   MCV 87        Recent Labs   Lab Test 12/18/22  0824 07/15/22  0219 04/21/22  0030   HGB 14.2 13.7 11.4*    Recent Labs   Lab Test 07/15/22  0219   TROPONINI <0.01     No results for input(s): BNP, NTBNPI, NTBNP in the last 02442 hours.  Recent Labs   Lab Test 12/18/22  0824   TSH 5.16*     Recent Labs   Lab Test 08/12/20  1106   INR 0.99         Data Review    ECGs (tracings independently reviewed)  12/18/2022 - SR 76bpm  12/18/2022 - AF, ventricular rate 157bpm  7/15/2022 - AF, ventricular rate 150bpm    Cardiac event monitoring from 7/25/2022 to 8/3/2022 (monitored duration 3d 22h 38m) (independently reviewed)  Baseline rhythm was sinus rhythm 78bpm.    Reported heart rate range 50 to 120bpm, average 74bpm.  2 symptom triggers (other, palpitations) correlated to sinus rhythm 69-87bpm.  No nonsustained or sustained tachyarrhythmias.  No atrial fibrillation.  There were no pauses noted.  Supraventricular and ventricular ectopic beat frequency are not reported on this  "monitoring modality.      6/5/2021 TTE  Visually Estimated EF: 60-65%   Normal LV size and systolic function.   Normal RV size and systolic function.   No significant valvular heart disease noted.     1/24/2023 MPI     The nuclear stress test is negative for inducible myocardial ischemia or infarction.     The left ventricular ejection fraction at stress is 70%.     The patient is at a low risk of future cardiac ischemic events.     The  images demonstrate breast attenuation.     There is no prior study for comparison.       Service was provided face-to-face with the patient via interactive videoconferencing  This exam was initially conducted via a secure 256-bit AES encrypted bidirectional video session.   Video start time 0824   Video end time 0848  Total time (in minutes) including non face-to-face time (reviewing records, documentation, etc..) 35    You have chosen to receive care through the use of telemedicine. Telemedicine enables health care providers at different locations to provide safe, effective and convenient care through the use of technology. As with any health care service, there are risks associated with the use of telemedicine, including equipment failure, poor image resolution and  issues.  Do you understand the risks and benefits of telemedicine as I have explained them to you? \"Yes\"  Have your questions regarding telemedicine been answered? \"Yes\"  Patient is currently at home.  Do you consent to the use of telemedicine in your medical care today?  Yes.   Last question, I need to confirm where are you physically located right now?  Answer: Patient confirms they are located in a state where IShira MD  am licensed.      Cc: Saran Vargas MD, Katia Rm MD Amila Dilusha William, MD  2/22/2023  8:28 AM    Thank you for allowing me to participate in the care of your patient.      Sincerely,     Shira Lowe MD     [facility]" [facility]  cc:   No referring provider defined for this encounter.

## 2023-02-23 ENCOUNTER — NURSE TRIAGE (OUTPATIENT)
Dept: NURSING | Facility: CLINIC | Age: 38
End: 2023-02-23
Payer: COMMERCIAL

## 2023-02-23 DIAGNOSIS — L30.9 DERMATITIS OF LEFT FOOT: Primary | ICD-10-CM

## 2023-02-23 RX ORDER — PREDNISONE 10 MG/1
TABLET ORAL
Qty: 30 TABLET | Refills: 0 | Status: SHIPPED | OUTPATIENT
Start: 2023-02-23 | End: 2024-07-24

## 2023-02-23 NOTE — TELEPHONE ENCOUNTER
"Nurse Triage SBAR    Is this a 2nd Level Triage? YES, LICENSED PRACTITIONER REVIEW IS REQUIRED    Situation: Patient calling with severe itching after surgery (not surgical area - just where boot rubs).  Consent: not needed    Background: Pt had surgery on bone spur on heel a week ago.  Since surgery, pt has intense itching.  States \"I'm in a boot for 8 weeks\" and the boot is not to come off at all.  States a nurse told her to take boot off to air skin out but states the provider has told her not to worry about her skin and to always keep the boot on.   States the itching is so intense that she's making her skin bleed by itching.  States she's now taken the boot off and put hydrocortisone cream on it which is mildly helpful.  States she took benadryl but it did not stop the itch.     States both IV sites are itchy too and noting some hives.    Pt states she is scratching so much that she is bleeding.     Assessment:   Left leg.    Itching is the main sx.    Itching started to get bad a few days after surgery.    Underlying injury was a bone spur surgery on L heel.    Pt states she continues to have aching in the healing area - notes just itching.     Pt states her skin is the same today as it was when she was seen earlier this week and was told that everything is okay and nothing needed for her current situation.     Protocol Recommended Disposition:   Home Care    Recommendation: Advised patient to do home care. Home care reviewed.  . Reviewed concerning symptoms and when to call back.     Routed to provider    Does the patient meet one of the following criteria for ADS visit consideration? 16+ years old, with an [facility] PCP     TIP  Providers, please consider if this condition is appropriate for management at one of our Acute and Diagnostic Services sites.     If patient is a good candidate, please use dotphrase <dot>triageresponse and select Refer to ADS to document.     Elisa Rajput RN [facility] " Advisors 2/23/2023 8:20 AM      Reason for Disposition    MODERATE-SEVERE local itching (i.e., interferes with work, school, activities) and not improved after 24 hours of hydrocortisone cream    Additional Information    Negative: Chest pain    Negative: Difficulty breathing    Negative: SEVERE pain (e.g., excruciating, pain scale 8-10) under cast and not improved after pain medicines and elevation    Negative: SEVERE pain of fingers or toes, and not improved after pain medications and elevation    Negative: Patient sounds very sick or weak to the triager    Negative: Numbness or tingling of fingers or toes, and not improved after elevation    Negative: Blueness or pallor of fingers or toes (compared to non-injured side)    Negative: Increasing pain under cast and cast put on > 24 hours ago, and not improved after elevation    Negative: Fingers or toes become swollen (compared to noninjured side), and not improved after elevation    Negative: Fingers or toes cold (compared to noninjured side), and not improved after elevation    Negative: Can't wiggle fingers or toes    Negative: Foreign body gets stuck under the cast    Negative: Bad odor comes from underneath the cast    Negative: Drainage comes through cast or out of end of cast    Negative: Unexplained fever occurs    Negative: Skin becomes red or raw at edge of cast    Negative: Plaster or fiberglass cast gets wet and patient has metal pins sticking out of skin    Negative: Cast breaks, cracks or falls off    Negative: Plaster cast gets wet and is soft after attempted drying    Negative: Cast feels too loose    Negative: Cast feels too tight    Negative: Cast removal date, questions about    Negative: Triager unable to answer question    Negative: Athlete's foot suspected (e.g., itchy rash of feet, especially 3rd-4th web spaces)    Negative: Insect bites suspected    Negative: Jock Itch suspected (e.g., itchy rash on upper inner thigh)    Negative: Pubic lice  suspected (e.g., genital itching and lice or nits are seen)    Negative: Poison ivy, oak, or sumac suspected (e.g., itchy rash after contact with poison ivy)    Negative: Genital itching - female    Negative: Genital itching - male    Negative: Rectal (anus) itching    Negative: Localized rash also present    Negative: Patient sounds very sick or weak to the triager    Negative: Looks infected (e.g., spreading redness, red streak, pus)    Protocols used: ITCHING - [id number], CAST SYMPTOMS AND [id number]

## 2023-03-01 ENCOUNTER — OFFICE VISIT (OUTPATIENT)
Dept: PODIATRY | Facility: CLINIC | Age: 38
End: 2023-03-01
Payer: COMMERCIAL

## 2023-03-01 VITALS — HEART RATE: 83 BPM | BODY MASS INDEX: 47.8 KG/M2 | WEIGHT: 280 LBS | HEIGHT: 64 IN | OXYGEN SATURATION: 97 %

## 2023-03-01 DIAGNOSIS — M77.32 BONE SPUR OF POSTERIOR PORTION OF LEFT CALCANEUS: Primary | ICD-10-CM

## 2023-03-01 PROCEDURE — 99024 POSTOP FOLLOW-UP VISIT: CPT | Performed by: PODIATRIST

## 2023-03-01 NOTE — LETTER
3/1/2023         RE: Cherie Montgomery  [address]  [place] [address]        Dear Colleague,    Thank you for referring your patient, Cherie Montgomery, to the [facility]. Please see a copy of my visit note below.    Subjective findings: The patient return to the clinic today for postop visit #1, 10 days status post excision retrocalcaneal spur  with secondary repair of Achilles tendon left foot.  The patient is in good spirits and she had no complaints.    Objective findings: The dressings were removed and wound margins well coaptated and maintained.  There is moderate edema but no erythema, cellulitis, drainage or bleeding noted.  Neurovascular status is intact.  Vital signs stable.    Assessment: Retrocalcaneal spur left foot    Plan: All Steri-Strips removed today.  A postop x-ray of the left foot was taken today.  The x-rays were read and interpreted by this physician.  The patient was instructed to begin bathing in a normal manner.  She is to remain total nonweightbearing for the next 2 weeks.  She may then weight-bear in the cam boot for 4 weeks.  She is to return to clinic in 6 weeks for postop visit #2.       Again, thank you for allowing me to participate in the care of your patient.        Sincerely,        Jomar Painter DPM

## 2023-03-01 NOTE — PROGRESS NOTES
Subjective findings: The patient return to the clinic today for postop visit #1, 10 days status post excision retrocalcaneal spur  with secondary repair of Achilles tendon left foot.  The patient is in good spirits and she had no complaints.    Objective findings: The dressings were removed and wound margins well coaptated and maintained.  There is moderate edema but no erythema, cellulitis, drainage or bleeding noted.  Neurovascular status is intact.  Vital signs stable.    Assessment: Retrocalcaneal spur left foot    Plan: All Steri-Strips removed today.  A postop x-ray of the left foot was taken today.  The x-rays were read and interpreted by this physician.  The patient was instructed to begin bathing in a normal manner.  She is to remain total nonweightbearing for the next 2 weeks.  She may then weight-bear in the cam boot for 4 weeks.  She is to return to clinic in 6 weeks for postop visit #2.

## 2023-03-07 ENCOUNTER — TELEPHONE (OUTPATIENT)
Dept: NURSING | Facility: CLINIC | Age: 38
End: 2023-03-07
Payer: COMMERCIAL

## 2023-03-07 NOTE — TELEPHONE ENCOUNTER
Telephone call    Patient calling to see what she is suppose to do with the incision site the doctor had put a band aid on it after he took the surgical tape off she was unsure how long she needed to keep the incision covered.  Transferred her over to the pediatry department.    Tigist Jalloh RN   [facility] Nurse Advisor  3:52 PM 3/7/2023

## 2023-03-07 NOTE — TELEPHONE ENCOUNTER
Caller: Cherie    Provider: MD Jomar Painter    Detailed reason for call: Cherie is calling wondering what she should be doing for wound care?  She states the band aide came off after two days.  Please advise as she doesn't have a follow up until next month.  She is also wondering how long she should feel light to moderate pain?     Best phone number to contact: [phone]    Best time to contact: any    Ok to leave a detailed message: Yes    Ok to speak to authorized person if needed: No      (Noted to patient if reason is related to wound or incision, to please send a photo via email or [employer].)

## 2023-03-08 NOTE — TELEPHONE ENCOUNTER
Left message for patient to call back.    Jomar Painter DPM Sachs, Ashley A, MA 1 hour ago (8:36 AM)     [facility]  If the patient is having continued drainage or if the wound is open she should return to the clinic this week for follow-up visit.  If she has not noticed any drainage and the wound is healed she does not need any wound care.  She does not have to apply anything to the incision site.  It is not unusual to continue to have some pain.  It should be mild to moderate.  It sounds as though she is healing well without any particular complication.  She should do okay until her next visit.  Please keep the boot on at all times.

## 2023-03-13 ENCOUNTER — TELEPHONE (OUTPATIENT)
Dept: PODIATRY | Facility: CLINIC | Age: 38
End: 2023-03-13
Payer: COMMERCIAL

## 2023-03-13 NOTE — TELEPHONE ENCOUNTER
Caller: Cherie    Provider: MD Jomar Painter    Detailed reason for call: Cherie is calling stating she is not getting support from Dr. Painter after having her surgery.  She hasn't gotten clear instructions on the after care. (the AVS says don't apply anything to the site, but he put on a band aide on, should she take it off?)   She was told she could start walking on her foot today and she feels like she can't put weight on her foot, because of the weakness of her foot or how the the boot is positioned.  She feels a lot of pressure in her heel when she tries to put weight on her foot. She is wondering if maybe PT would help her?     Best phone number to contact: [phone]    Best time to contact: any     Ok to leave a detailed message: Yes    Ok to speak to authorized person if needed: No      (Noted to patient if reason is related to wound or incision, to please send a photo via email or [employer].)

## 2023-03-14 ENCOUNTER — TRANSFERRED RECORDS (OUTPATIENT)
Dept: HEALTH INFORMATION MANAGEMENT | Facility: CLINIC | Age: 38
End: 2023-03-14

## 2023-03-20 ENCOUNTER — TELEPHONE (OUTPATIENT)
Dept: CARDIOLOGY | Facility: CLINIC | Age: 38
End: 2023-03-20
Payer: COMMERCIAL

## 2023-03-20 NOTE — TELEPHONE ENCOUNTER
Spoke to pt regarding results, apologized she did not receive a call when Dr. Lowe reviewed them back on 2/28.    Discussed results that were printed on the report from Dr. Lowe, and recommendations from office visit.  Pt states understanding.    Offered in clinic visit to discuss with Dr. Lowe and she states she will call back if she would like to schedule.    No further questions or concerns at this time.    Luanne

## 2023-03-20 NOTE — TELEPHONE ENCOUNTER
[facility] Call Center    Phone Message    May a detailed message be left on voicemail: yes     Reason for Call: Other:     Pt is requesting a callback to review the zio patch results.  She stated she has been billed for the zio patch and was hoping the results were in.  Please call to discuss as soon as possible.    Action Taken: Other: cardio    Travel Screening: Not Applicable     Thank you!  [facility]

## 2023-04-12 ENCOUNTER — OFFICE VISIT (OUTPATIENT)
Dept: PODIATRY | Facility: CLINIC | Age: 38
End: 2023-04-12
Payer: COMMERCIAL

## 2023-04-12 VITALS — HEART RATE: 86 BPM | OXYGEN SATURATION: 97 % | BODY MASS INDEX: 47.8 KG/M2 | HEIGHT: 64 IN | WEIGHT: 280 LBS

## 2023-04-12 DIAGNOSIS — M77.32 BONE SPUR OF POSTERIOR PORTION OF LEFT CALCANEUS: Primary | ICD-10-CM

## 2023-04-12 PROCEDURE — 99024 POSTOP FOLLOW-UP VISIT: CPT | Performed by: PODIATRIST

## 2023-04-12 ASSESSMENT — PAIN SCALES - GENERAL: PAINLEVEL: NO PAIN (1)

## 2023-04-12 NOTE — LETTER
4/12/2023         RE: Cherie Montgomery  [address] [place] [address]        Dear Colleague,    Thank you for referring your patient, Cherie Montgomery, to the [facility]. Please see a copy of my visit note below.    Subjective findings: The patient return to the clinic today for postop visit #3,8 weeks status post excision retrocalcaneal spur  with secondary repair of Achilles tendon left foot.  The patient is in good spirits and she had no complaints.  The patient stated that she has been ambulating in normal shoes for the past several days.     Objective findings: The dressings were removed and wound margins is well-healed.   There is moderate edema but no erythema, cellulitis, drainage or bleeding noted.  Neurovascular status is intact.  Vital signs stable.     Assessment: Retrocalcaneal spur left foot     Plan:  I informed the patient she may now officially begin to gradually return to normal activities.  She is to return to clinic as needed.      Again, thank you for allowing me to participate in the care of your patient.        Sincerely,        Jomar Painter DPM

## 2023-04-12 NOTE — PROGRESS NOTES
Subjective findings: The patient return to the clinic today for postop visit #3,8 weeks status post excision retrocalcaneal spur  with secondary repair of Achilles tendon left foot.  The patient is in good spirits and she had no complaints.  The patient stated that she has been ambulating in normal shoes for the past several days.     Objective findings: The dressings were removed and wound margins is well-healed.   There is moderate edema but no erythema, cellulitis, drainage or bleeding noted.  Neurovascular status is intact.  Vital signs stable.     Assessment: Retrocalcaneal spur left foot     Plan:  I informed the patient she may now officially begin to gradually return to normal activities.  She is to return to clinic as needed.

## 2023-05-01 ENCOUNTER — TELEPHONE (OUTPATIENT)
Dept: PODIATRY | Facility: CLINIC | Age: 38
End: 2023-05-01
Payer: COMMERCIAL

## 2023-05-01 NOTE — TELEPHONE ENCOUNTER
Caller: Cherie    Provider: MD Jomar Painter    Detailed reason for call: Patient was understanding that she should be 100% recovered within 8-9 weeks post op.  She would like a call back to discuss the expectations of where she should be with her healing process.  She states she still has pain, and notes it is more pain than before surgery.  She would like a call back from a nurse to help determine if this is normal and she should be patient, or if she should be seen.     Best phone number to contact: [phone]    Best time to contact: any    Ok to leave a detailed message: Yes    Ok to speak to authorized person if needed: Yes: spouse      (Noted to patient if reason is related to wound or incision, to please send a photo via email or [employer].)

## 2023-05-02 DIAGNOSIS — M77.52 RETROCALCANEAL BURSITIS (BACK OF HEEL), LEFT: Primary | ICD-10-CM

## 2023-05-22 ENCOUNTER — LAB (OUTPATIENT)
Dept: LAB | Facility: CLINIC | Age: 38
End: 2023-05-22
Payer: COMMERCIAL

## 2023-05-22 DIAGNOSIS — E03.9 HYPOTHYROIDISM: Primary | ICD-10-CM

## 2023-05-22 DIAGNOSIS — E03.9 HYPOTHYROIDISM: ICD-10-CM

## 2023-05-22 LAB
T3FREE SERPL-MCNC: 3.1 PG/ML (ref 2–4.4)
T4 FREE SERPL-MCNC: 1.05 NG/DL (ref 0.9–1.7)
TSH SERPL DL<=0.005 MIU/L-ACNC: 5.12 UIU/ML (ref 0.3–4.2)

## 2023-05-22 PROCEDURE — 84481 FREE ASSAY (FT-3): CPT

## 2023-05-22 PROCEDURE — 84443 ASSAY THYROID STIM HORMONE: CPT

## 2023-05-22 PROCEDURE — 36415 COLL VENOUS BLD VENIPUNCTURE: CPT

## 2023-05-22 PROCEDURE — 84439 ASSAY OF FREE THYROXINE: CPT

## 2023-10-08 ENCOUNTER — HEALTH MAINTENANCE LETTER (OUTPATIENT)
Age: 38
End: 2023-10-08

## 2023-10-26 ENCOUNTER — TELEPHONE (OUTPATIENT)
Dept: CARDIOLOGY | Facility: CLINIC | Age: 38
End: 2023-10-26
Payer: COMMERCIAL

## 2023-10-26 DIAGNOSIS — I48.0 PAROXYSMAL ATRIAL FIBRILLATION (H): Primary | ICD-10-CM

## 2023-10-26 NOTE — TELEPHONE ENCOUNTER
[facility] Call Center    Phone Message    May a detailed message be left on voicemail: yes     Reason for Call: Other: Patient would like a call back regarding medications. Please reach out.       Action Taken: Other: Cardiology     Travel Screening: Not Applicable    Thank you!  Special[facility] Access Center

## 2023-12-15 DIAGNOSIS — E03.9 HYPOTHYROIDISM, ADULT: Primary | ICD-10-CM

## 2023-12-26 DIAGNOSIS — I48.0 PAROXYSMAL ATRIAL FIBRILLATION (H): ICD-10-CM

## 2023-12-26 RX ORDER — VERAPAMIL HYDROCHLORIDE 120 MG/1
TABLET, FILM COATED, EXTENDED RELEASE ORAL
Qty: 90 TABLET | Refills: 0 | Status: SHIPPED | OUTPATIENT
Start: 2023-12-26 | End: 2024-03-25

## 2024-01-22 ENCOUNTER — LAB (OUTPATIENT)
Dept: LAB | Facility: CLINIC | Age: 39
End: 2024-01-22
Payer: COMMERCIAL

## 2024-01-22 DIAGNOSIS — E03.9 HYPOTHYROIDISM, ADULT: ICD-10-CM

## 2024-01-22 PROCEDURE — 84443 ASSAY THYROID STIM HORMONE: CPT

## 2024-01-22 PROCEDURE — 36415 COLL VENOUS BLD VENIPUNCTURE: CPT

## 2024-01-23 LAB — TSH SERPL DL<=0.005 MIU/L-ACNC: 5.63 UIU/ML (ref 0.3–4.2)

## 2024-01-28 NOTE — PROGRESS NOTES
HEART CARE ENCOUNTER NOTE      [facility]  [phone]      Assessment/Recommendations   Assessment:   Paroxysmal atrial fibrillation: Patient has history of symptomatic palpitations.  Status post cardioversion 7/15/2022 and 12/18/2022.  Patient previously met with Dr. Lowe 2/22/2023 to discuss cardioversion, ablation, antiarrhythmic drug therapy.  Patient had wanted to continue on verapamil and work on diet and lifestyle and would reconsider if increased/longer AF episodes.  Patient has [id number] score of 1 for gender.  Patient notes only 1-2 episodes of atrial fibrillation since starting on the verapamil a little over a year ago.  There had been previous discussion about flecainide to use as pill in pocket and patient underwent stress test a year ago which was negative for ischemia.  Patient is interested in discussing this further and having it more for peace of mind.  Elevated blood pressure without diagnosis of hypertension: In clinic today blood pressure was elevated at 152/90.  Upon recheck was 138/90.  Patient notes she recently checked her blood pressure with her friend's blood pressure monitor and was in the 140s systolic.  Patient will get a blood pressure cuff and start monitoring at home.  If consistently elevated, a diagnosis of hypertension would raise her [id number] score to 2.  With that score and her history of antiphospholipid antibody syndrome would consider anticoagulation.  Hypothyroidism: Recent TSH 1/22/2024 was slightly elevated at 5.63.  On Synthroid 50 mcg daily.  Suspected sleep apnea: Referral for sleep medicine was placed 5/12/2022 visit with Dr. Vargas. Patient did not have a sleep study.  Is hesitant at this time but will consider it.  Antiphospholipid antibody syndrome: manifesting at recurrent miscarriages. No history of venous thromboembolism. Was on enoxaparin during pregnancies.     Plan:   CMP today  Patient will obtain blood pressure cuff and  monitor blood pressure few times a week.  If consistently elevated and new diagnosis of hypertension this would raise [id number] score to 2 at which point would consider anticoagulation.  Consider sleep study-had discussion about this and patient is still hesitant.  If she decides we will put in a referral for her.  Follow up with Dr. Lowe in A-fib clinic in 2 to 3 months to discuss possibility of flecainide pill in pocket as well as need for anticoagulation if applicable.      Follow up with Dr. Lowe in 2 to 3 months.    The longitudinal plan of care for Cherie Montgomery was addressed during this visit. Due to the added complexity in care, I will continue to support Cherie in the subsequent management of this condition(s) and with the ongoing continuity of care of this condition(s).      History of Present Illness/Subjective    HPI: Cherie Montgomery is a 38 year old female with PMHx of obesity, hypothyroidism, APLA syndrome, atrial fibrillation status post cardioversion 7/15/2022 and 12/18/2022 presents for follow-up.  Patient has history of being symptomatic with her atrial fibrillation with palpitations.  Is maintained on verapamil 120 mg daily.    Patient notes that since starting the verapamil a little over a year ago she has only had 1-2 episodes of atrial fibrillation.  Does endorse having other fluttering sensations in her chest that only last for 1 to 2 seconds but occur daily.  Patient is very concerned about her atrial fibrillation feeling that her heart is not functioning properly.  Has fear of having heart attack or stroke.  Reassured her today that atrial fibrillation is not a deadly rhythm.  Patient's blood pressure was elevated at clinic today and patient notes that she had taken her blood pressure using her friend's blood pressure cuff recently and was consistently in the 140s that day.  Patient thinks she was previously in the 120s to 130s when she was pregnant 2 years ago and having  "frequent checkups.  Patient denies having any exertional chest pain.  Does note feeling winded when walking up the steps though she notes this is nothing new and attributes it to being out of shape.  She denies fatigue, lightheadedness, shortness of breath, orthopnea, PND, chest pain, abdominal fullness/bloating, and lower extremity edema.          Nuclear stress test 1/24/2023:    The nuclear stress test is negative for inducible myocardial ischemia or infarction.    The left ventricular ejection fraction at stress is 70%.    The patient is at a low risk of future cardiac ischemic events.    The  images demonstrate breast attenuation.    There is no prior study for comparison.    Cardiac event monitor 7/25/2022:  Cardiac event monitoring from 7/25/2022 to 8/3/2022 (monitored duration 3d 22h 38m).  Baseline rhythm was sinus rhythm 78bpm.    Reported heart rate range 50 to 120bpm, average 74bpm.  2 symptom triggers (other, palpitations) correlated to sinus rhythm 69-87bpm.  No nonsustained or sustained tachyarrhythmias.  No atrial fibrillation.  There were no pauses noted.  Supraventricular and ventricular ectopic beat frequency are not reported on this monitoring modality.      CTA chest 6/4/2021:  Coronary artery calcification: None.     Physical Examination  Review of Systems   Vitals: BP (!) 152/90 (BP Location: Right arm, Patient Position: Sitting, Cuff Size: Adult Large)   Pulse 81   Resp 16   Ht 1.626 m (5' 4\")   Wt 132.5 kg (292 lb 3.2 oz)   SpO2 98%   BMI 50.16 kg/m    BMI= Body mass index is 50.16 kg/m .  Wt Readings from Last 3 Encounters:   01/29/24 132.5 kg (292 lb 3.2 oz)   04/12/23 127 kg (280 lb)   03/01/23 127 kg (280 lb)           ENT/Mouth: membranes moist, no oral lesions or bleeding gums.              Chest/Lungs:   lungs are clear to auscultation, no rales or wheezing, equal chest wall expansion    Cardiovascular:   Regular. Normal first and second heart sounds with no " murmurs, rubs, or gallops; the radial pulses are intact, no edema bilaterally        Extremities: no cyanosis or clubbing   Skin: no xanthelasma, warm.    Neurologic: no tremors     Psychiatric: alert and oriented x3, calm        Please refer above for cardiac ROS details.        Medical History  Surgical History Family History Social History   Past Medical History:   Diagnosis Date    Anti-phospholipid antibody syndrome (H24)     Atrial fibrillation (H)     Dysmenorrhea     Hypertension     with second full term pregnancy    Obese     Thyroid disease     hypothyroid     Past Surgical History:   Procedure Laterality Date    DILATION AND CURETTAGE N/A 2018    Procedure: SUCTION DILATION AND CURETTAGE;  Surgeon: Roxanna Moody MD;  Location: [facility];  Service:     DILATION AND CURETTAGE, OPERATIVE HYSTEROSCOPY WITH MORCELLATOR, COMBINED N/A 2019    Procedure: HYSTEROSCOPY, POLYPECTOMY, POSSIBLE DILATION AND CURETTAGE ([employer] MORCELLATOR AND FLUID MANAGEMENT);  Surgeon: Katia Rm MD;  Location:  OR    EXCISION, EXOSTOSIS, RETROCALCANEAL Left 2023    Procedure: Excision retrocalcaneal spur left foot;  Surgeon: Jomar Painter DPM;  Location[facility]    GYN SURGERY      D & C    TOE SURGERY       Family History   Problem Relation Age of Onset    Bipolar Disorder Other     Depression Other     Alcoholism Other     Attention Deficit Disorder Other         Social History     Socioeconomic History    Marital status:      Spouse name: Not on file    Number of children: Not on file    Years of education: Not on file    Highest education level: Not on file   Occupational History    Not on file   Tobacco Use    Smoking status: Former     Types: Cigarettes     Quit date: 2018     Years since quittin.0     Passive exposure: Never    Smokeless tobacco: Never   Vaping Use    Vaping Use: Never used   Substance and Sexual Activity    Alcohol use: Not Currently     " Comment: 2/month    Drug use: Never    Sexual activity: Yes     Partners: Male   Other Topics Concern    Parent/sibling w/ CABG, MI or angioplasty before 65F 55M? Not Asked   Social History Narrative    Not on file     Social Determinants of Health     Financial Resource Strain: Not on file   Food Insecurity: Not on file   Transportation Needs: Not on file   Physical Activity: Not on file   Stress: Not on file   Social Connections: Not on file   Interpersonal Safety: Not on file   Housing Stability: Not on file           Medications  Allergies   Current Outpatient Medications   Medication Sig Dispense Refill    aspirin 81 MG EC tablet Take 81 mg by mouth daily      levothyroxine (SYNTHROID/LEVOTHROID) 50 MCG tablet Take 50 mcg by mouth daily      magnesium 250 MG tablet Take 1 tablet by mouth daily      verapamil ER (CALAN-SR) 120 MG CR tablet TAKE 1 TABLET(120 MG) BY MOUTH AT BEDTIME 90 tablet 0    Vitamin D3 (CHOLECALCIFEROL) 125 MCG (5000 UT) tablet Take 1 tablet by mouth daily      predniSONE (DELTASONE) 10 MG tablet 40,30,20,10 mg x3 days each (Patient not taking: Reported on 1/29/2024) 30 tablet 0    Prenatal Vit-Fe Fumarate-FA (PRENATAL PO) Take 1 tablet by mouth daily (Patient not taking: Reported on 1/29/2024)       No Known Allergies       Lab Results    Chemistry/lipid CBC Cardiac Enzymes/BNP/TSH/INR   No results for input(s): \"CHOL\", \"HDL\", \"LDL\", \"TRIG\", \"CHOLHDLRATIO\" in the last 03384 hours.  No results for input(s): \"LDL\" in the last 35177 hours.  Recent Labs   Lab Test 02/14/23 0338      POTASSIUM 4.7   CHLORIDE 106   CO2 21*   *   BUN 11   CR 0.78   GFRESTIMATED >90   BRIEN 9.6     Recent Labs   Lab Test 02/14/23 0338 12/18/22  0824 07/15/22  0219   CR 0.78 0.77 0.97     No results for input(s): \"A1C\" in the last 48918 hours.       Recent Labs   Lab Test 02/14/23 0338   WBC 11.9*   HGB 13.5   HCT 39.8   MCV 88        Recent Labs   Lab Test 02/14/23 0338 12/18/22  0824 " "07/15/22  0219   HGB 13.5 14.2 13.7    Recent Labs   Lab Test 02/14/23  0642 02/14/23  0338 07/15/22  0219   TROPONINI <0.01 <0.01 <0.01     No results for input(s): \"BNP\", \"NTBNPI\", \"NTBNP\" in the last 81526 hours.  Recent Labs   Lab Test 01/22/24  1300   TSH 5.63*     Recent Labs   Lab Test 08/12/20  1106   INR 0.99          Mary Martinez PA-C                                       "

## 2024-01-29 ENCOUNTER — OFFICE VISIT (OUTPATIENT)
Dept: CARDIOLOGY | Facility: CLINIC | Age: 39
End: 2024-01-29
Attending: GENERAL ACUTE CARE HOSPITAL
Payer: COMMERCIAL

## 2024-01-29 VITALS
DIASTOLIC BLOOD PRESSURE: 90 MMHG | OXYGEN SATURATION: 98 % | RESPIRATION RATE: 16 BRPM | WEIGHT: 292.2 LBS | HEART RATE: 81 BPM | HEIGHT: 64 IN | BODY MASS INDEX: 49.89 KG/M2 | SYSTOLIC BLOOD PRESSURE: 138 MMHG

## 2024-01-29 DIAGNOSIS — D68.61 ANTIPHOSPHOLIPID SYNDROME (H): Primary | ICD-10-CM

## 2024-01-29 DIAGNOSIS — I48.0 PAROXYSMAL ATRIAL FIBRILLATION (H): ICD-10-CM

## 2024-01-29 LAB
ALBUMIN SERPL BCG-MCNC: 4.6 G/DL (ref 3.5–5.2)
ALP SERPL-CCNC: 109 U/L (ref 40–150)
ALT SERPL W P-5'-P-CCNC: 21 U/L (ref 0–50)
ANION GAP SERPL CALCULATED.3IONS-SCNC: 6 MMOL/L (ref 7–15)
AST SERPL W P-5'-P-CCNC: 25 U/L (ref 0–45)
BILIRUB SERPL-MCNC: 0.3 MG/DL
BUN SERPL-MCNC: 14.2 MG/DL (ref 6–20)
CALCIUM SERPL-MCNC: 9.4 MG/DL (ref 8.6–10)
CHLORIDE SERPL-SCNC: 103 MMOL/L (ref 98–107)
CREAT SERPL-MCNC: 0.76 MG/DL (ref 0.51–0.95)
DEPRECATED HCO3 PLAS-SCNC: 28 MMOL/L (ref 22–29)
EGFRCR SERPLBLD CKD-EPI 2021: >90 ML/MIN/1.73M2
GLUCOSE SERPL-MCNC: 99 MG/DL (ref 70–99)
POTASSIUM SERPL-SCNC: 4.7 MMOL/L (ref 3.4–5.3)
PROT SERPL-MCNC: 8.4 G/DL (ref 6.4–8.3)
SODIUM SERPL-SCNC: 137 MMOL/L (ref 135–145)

## 2024-01-29 PROCEDURE — G2211 COMPLEX E/M VISIT ADD ON: HCPCS | Performed by: STUDENT IN AN ORGANIZED HEALTH CARE EDUCATION/TRAINING PROGRAM

## 2024-01-29 PROCEDURE — 36415 COLL VENOUS BLD VENIPUNCTURE: CPT | Performed by: STUDENT IN AN ORGANIZED HEALTH CARE EDUCATION/TRAINING PROGRAM

## 2024-01-29 PROCEDURE — 99214 OFFICE O/P EST MOD 30 MIN: CPT | Performed by: STUDENT IN AN ORGANIZED HEALTH CARE EDUCATION/TRAINING PROGRAM

## 2024-01-29 PROCEDURE — 80053 COMPREHEN METABOLIC PANEL: CPT | Performed by: STUDENT IN AN ORGANIZED HEALTH CARE EDUCATION/TRAINING PROGRAM

## 2024-01-29 RX ORDER — MULTIVITAMIN WITH IRON
1 TABLET ORAL DAILY
COMMUNITY
End: 2024-07-24

## 2024-01-29 RX ORDER — ASPIRIN 81 MG/1
81 TABLET ORAL DAILY
COMMUNITY

## 2024-01-29 NOTE — LETTER
1/29/2024    Katia Rm MD  [address]  [facility] [address]    RE: Cherie Montgomery       Dear Colleague,     I had the pleasure of seeing Cherie Montgomery in the [facility].    HEART CARE ENCOUNTER NOTE      [facility]  [phone]      Assessment/Recommendations   Assessment:   Paroxysmal atrial fibrillation: Patient has history of symptomatic palpitations.  Status post cardioversion 7/15/2022 and 12/18/2022.  Patient previously met with Dr. Lowe 2/22/2023 to discuss cardioversion, ablation, antiarrhythmic drug therapy.  Patient had wanted to continue on verapamil and work on diet and lifestyle and would reconsider if increased/longer AF episodes.  Patient has [id number] score of 1 for gender.  Patient notes only 1-2 episodes of atrial fibrillation since starting on the verapamil a little over a year ago.  There had been previous discussion about flecainide to use as pill in pocket and patient underwent stress test a year ago which was negative for ischemia.  Patient is interested in discussing this further and having it more for peace of mind.  Elevated blood pressure without diagnosis of hypertension: In clinic today blood pressure was elevated at 152/90.  Upon recheck was 138/90.  Patient notes she recently checked her blood pressure with her friend's blood pressure monitor and was in the 140s systolic.  Patient will get a blood pressure cuff and start monitoring at home.  If consistently elevated, a diagnosis of hypertension would raise her [id number] score to 2.  With that score and her history of antiphospholipid antibody syndrome would consider anticoagulation.  Hypothyroidism: Recent TSH 1/22/2024 was slightly elevated at 5.63.  On Synthroid 50 mcg daily.  Suspected sleep apnea: Referral for sleep medicine was placed 5/12/2022 visit with Dr. Vargas. Patient did not have a sleep study.  Is hesitant at this time but will consider  it.  Antiphospholipid antibody syndrome: manifesting at recurrent miscarriages. No history of venous thromboembolism. Was on enoxaparin during pregnancies.     Plan:   CMP today  Patient will obtain blood pressure cuff and monitor blood pressure few times a week.  If consistently elevated and new diagnosis of hypertension this would raise [id number]-[id number] score to 2 at which point would consider anticoagulation.  Consider sleep study-had discussion about this and patient is still hesitant.  If she decides we will put in a referral for her.  Follow up with Dr. Lowe in A-fib clinic in 2 to 3 months to discuss possibility of flecainide pill in pocket as well as need for anticoagulation if applicable.      Follow up with Dr. Lowe in 2 to 3 months.    The longitudinal plan of care for Cherie Montgomery was addressed during this visit. Due to the added complexity in care, I will continue to support Cherie in the subsequent management of this condition(s) and with the ongoing continuity of care of this condition(s).      History of Present Illness/Subjective    HPI: Cherie Montgomery is a 38 year old female with PMHx of obesity, hypothyroidism, APLA syndrome, atrial fibrillation status post cardioversion 7/15/2022 and 12/18/2022 presents for follow-up.  Patient has history of being symptomatic with her atrial fibrillation with palpitations.  Is maintained on verapamil 120 mg daily.    Patient notes that since starting the verapamil a little over a year ago she has only had 1-2 episodes of atrial fibrillation.  Does endorse having other fluttering sensations in her chest that only last for 1 to 2 seconds but occur daily.  Patient is very concerned about her atrial fibrillation feeling that her heart is not functioning properly.  Has fear of having heart attack or stroke.  Reassured her today that atrial fibrillation is not a deadly rhythm.  Patient's blood pressure was elevated at clinic today and patient notes that she had  "taken her blood pressure using her friend's blood pressure cuff recently and was consistently in the 140s that day.  Patient thinks she was previously in the 120s to 130s when she was pregnant 2 years ago and having frequent checkups.  Patient denies having any exertional chest pain.  Does note feeling winded when walking up the steps though she notes this is nothing new and attributes it to being out of shape.  She denies fatigue, lightheadedness, shortness of breath, orthopnea, PND, chest pain, abdominal fullness/bloating, and lower extremity edema.          Nuclear stress test 1/24/2023:    The nuclear stress test is negative for inducible myocardial ischemia or infarction.    The left ventricular ejection fraction at stress is 70%.    The patient is at a low risk of future cardiac ischemic events.    The  images demonstrate breast attenuation.    There is no prior study for comparison.    Cardiac event monitor 7/25/2022:  Cardiac event monitoring from 7/25/2022 to 8/3/2022 (monitored duration 3d 22h 38m).  Baseline rhythm was sinus rhythm 78bpm.    Reported heart rate range 50 to 120bpm, average 74bpm.  2 symptom triggers (other, palpitations) correlated to sinus rhythm 69-87bpm.  No nonsustained or sustained tachyarrhythmias.  No atrial fibrillation.  There were no pauses noted.  Supraventricular and ventricular ectopic beat frequency are not reported on this monitoring modality.      CTA chest 6/4/2021:  Coronary artery calcification: None.     Physical Examination  Review of Systems   Vitals: BP (!) 152/90 (BP Location: Right arm, Patient Position: Sitting, Cuff Size: Adult Large)   Pulse 81   Resp 16   Ht 1.626 m (5' 4\")   Wt 132.5 kg (292 lb 3.2 oz)   SpO2 98%   BMI 50.16 kg/m    BMI= Body mass index is 50.16 kg/m .  Wt Readings from Last 3 Encounters:   01/29/24 132.5 kg (292 lb 3.2 oz)   04/12/23 127 kg (280 lb)   03/01/23 127 kg (280 lb)           ENT/Mouth: membranes moist, no oral " lesions or bleeding gums.              Chest/Lungs:   lungs are clear to auscultation, no rales or wheezing, equal chest wall expansion    Cardiovascular:   Regular. Normal first and second heart sounds with no murmurs, rubs, or gallops; the radial pulses are intact, no edema bilaterally        Extremities: no cyanosis or clubbing   Skin: no xanthelasma, warm.    Neurologic: no tremors     Psychiatric: alert and oriented x3, calm        Please refer above for cardiac ROS details.        Medical History  Surgical History Family History Social History   Past Medical History:   Diagnosis Date    Anti-phospholipid antibody syndrome (H24)     Atrial fibrillation (H)     Dysmenorrhea     Hypertension     with second full term pregnancy    Obese     Thyroid disease     hypothyroid     Past Surgical History:   Procedure Laterality Date    DILATION AND CURETTAGE N/A 4/18/2018    Procedure: SUCTION DILATION AND CURETTAGE;  Surgeon: Roxanna Moody MD;  Location: [facility];  Service:     DILATION AND CURETTAGE, OPERATIVE HYSTEROSCOPY WITH MORCELLATOR, COMBINED N/A 1/21/2019    Procedure: HYSTEROSCOPY, POLYPECTOMY, POSSIBLE DILATION AND CURETTAGE ([employer] MORCELLATOR AND FLUID MANAGEMENT);  Surgeon: Katia Rm MD;  Location:  OR    EXCISION, EXOSTOSIS, RETROCALCANEAL Left 2/13/2023    Procedure: Excision retrocalcaneal spur left foot;  Surgeon: Jomar Painter DPM;  Location: [facility]    GYN SURGERY      D & C    TOE SURGERY       Family History   Problem Relation Age of Onset    Bipolar Disorder Other     Depression Other     Alcoholism Other     Attention Deficit Disorder Other         Social History     Socioeconomic History    Marital status:      Spouse name: Not on file    Number of children: Not on file    Years of education: Not on file    Highest education level: Not on file   Occupational History    Not on file   Tobacco Use    Smoking status: Former     Types: Cigarettes      "Quit date: 2018     Years since quittin.0     Passive exposure: Never    Smokeless tobacco: Never   Vaping Use    Vaping Use: Never used   Substance and Sexual Activity    Alcohol use: Not Currently     Comment: 2/month    Drug use: Never    Sexual activity: Yes     Partners: Male   Other Topics Concern    Parent/sibling w/ CABG, MI or angioplasty before 65F 55M? Not Asked   Social History Narrative    Not on file     Social Determinants of Health     Financial Resource Strain: Not on file   Food Insecurity: Not on file   Transportation Needs: Not on file   Physical Activity: Not on file   Stress: Not on file   Social Connections: Not on file   Interpersonal Safety: Not on file   Housing Stability: Not on file           Medications  Allergies   Current Outpatient Medications   Medication Sig Dispense Refill    aspirin 81 MG EC tablet Take 81 mg by mouth daily      levothyroxine (SYNTHROID/LEVOTHROID) 50 MCG tablet Take 50 mcg by mouth daily      magnesium 250 MG tablet Take 1 tablet by mouth daily      verapamil ER (CALAN-SR) 120 MG CR tablet TAKE 1 TABLET(120 MG) BY MOUTH AT BEDTIME 90 tablet 0    Vitamin D3 (CHOLECALCIFEROL) 125 MCG (5000 UT) tablet Take 1 tablet by mouth daily      predniSONE (DELTASONE) 10 MG tablet 40,30,20,10 mg x3 days each (Patient not taking: Reported on 2024) 30 tablet 0    Prenatal Vit-Fe Fumarate-FA (PRENATAL PO) Take 1 tablet by mouth daily (Patient not taking: Reported on 2024)       No Known Allergies       Lab Results    Chemistry/lipid CBC Cardiac Enzymes/BNP/TSH/INR   No results for input(s): \"CHOL\", \"HDL\", \"LDL\", \"TRIG\", \"CHOLHDLRATIO\" in the last 31868 hours.  No results for input(s): \"LDL\" in the last 13853 hours.  Recent Labs   Lab Test 23  0338      POTASSIUM 4.7   CHLORIDE 106   CO2 21*   *   BUN 11   CR 0.78   GFRESTIMATED >90   BRIEN 9.6     Recent Labs   Lab Test 23  0338 22  0824 07/15/22  0219   CR 0.78 0.77 0.97     No " "results for input(s): \"A1C\" in the last 08679 hours.       Recent Labs   Lab Test 02/14/23  0338   WBC 11.9*   HGB 13.5   HCT 39.8   MCV 88        Recent Labs   Lab Test 02/14/23  0338 12/18/22  0824 07/15/22  0219   HGB 13.5 14.2 13.7    Recent Labs   Lab Test 02/14/23  0642 02/14/23  0338 07/15/22  0219   TROPONINI <0.01 <0.01 <0.01     No results for input(s): \"BNP\", \"NTBNPI\", \"NTBNP\" in the last 46843 hours.  Recent Labs   Lab Test 01/22/24  1300   TSH 5.63*     Recent Labs   Lab Test 08/12/20  1106   INR 0.99          Mary Martinez PA-C        Thank you for allowing me to participate in the care of your patient.      Sincerely,     Mary Martinez PA-C     [facility]  cc:   Saran Vargas MD  [address]  [place] [address]      "

## 2024-01-29 NOTE — PATIENT INSTRUCTIONS
Cherie Montgomery,    It was a pleasure to see you today at the [facility].     My recommendations after this visit include:    - Continue current medications .   - CMP (lab work today)  - Consider sleep study- let me know if you want referral  - Get blood pressure cuff at home ([employer]) and monitor BP a few times a week- 1-2 hours after eating, after medications, when resting for 10-15 minutes. Keep track of readings and bring with you when you see Dr. Lowe.   - Follow up with Dr. Lowe in 2-3 months    - Please call [phone], if you have any questions or concerns      Mary Martinez PA-C

## 2024-03-23 DIAGNOSIS — I48.0 PAROXYSMAL ATRIAL FIBRILLATION (H): ICD-10-CM

## 2024-03-25 RX ORDER — VERAPAMIL HYDROCHLORIDE 120 MG/1
TABLET, FILM COATED, EXTENDED RELEASE ORAL
Qty: 90 TABLET | Refills: 0 | Status: SHIPPED | OUTPATIENT
Start: 2024-03-25 | End: 2024-06-24

## 2024-04-10 ENCOUNTER — OFFICE VISIT (OUTPATIENT)
Dept: CARDIOLOGY | Facility: CLINIC | Age: 39
End: 2024-04-10
Payer: COMMERCIAL

## 2024-04-10 VITALS
SYSTOLIC BLOOD PRESSURE: 138 MMHG | DIASTOLIC BLOOD PRESSURE: 88 MMHG | WEIGHT: 286 LBS | HEART RATE: 88 BPM | BODY MASS INDEX: 49.09 KG/M2 | OXYGEN SATURATION: 96 %

## 2024-04-10 DIAGNOSIS — I48.0 PAROXYSMAL ATRIAL FIBRILLATION (H): Primary | ICD-10-CM

## 2024-04-10 PROCEDURE — G2211 COMPLEX E/M VISIT ADD ON: HCPCS | Performed by: INTERNAL MEDICINE

## 2024-04-10 PROCEDURE — 99214 OFFICE O/P EST MOD 30 MIN: CPT | Performed by: INTERNAL MEDICINE

## 2024-04-10 RX ORDER — FLECAINIDE ACETATE 150 MG/1
150 TABLET ORAL DAILY PRN
Qty: 20 TABLET | Refills: 2 | Status: SHIPPED | OUTPATIENT
Start: 2024-04-10

## 2024-04-10 NOTE — LETTER
4/10/2024    Katia Rm MD  [address]  [place] [address]    RE: Cherie Montgomery       Dear Colleague,     I had the pleasure of seeing Cherie Montgomery in the [facility].     [facility]  Cardiac Electrophysiology  [address]  [place] [address]   Office: [phone]  Fax: [phone]     Patient: Cherie Montgomery   : 1985     CHIEF COMPLAINT/REASON FOR VISIT  Paroxysmal atrial fibrillation    Assessment/Recommendations   Cherie Montgomery is a 38 year old female with paroxysmal atrial fibrillation, APLAS, hypothyroidism, obesity presenting for follow-up regarding atrial fibrillation.    Paroxysmal atrial fibrillation - symptomatic with palpitations  [id number] 0-1  We reviewed atrial fibrillation physiology and management considerations including managing stroke risk, rate control, cardioversion, antiarrhythmic drug therapy, and catheter ablation.  We discussed atrial fibrillation ablation procedures, anticipated success rates, the potential need for re-do ablation vs addition of anti-arrhythmic drugs, procedural risks (including groin bleeding, tamponade, phrenic or esophageal injury, stroke, pulmonary vein stenosis) and recovery expectations.  She would prefer to have access to pill-in-pocket antiarrhythmic drug therapy, and continue verapamil   - flecainide 150mg once daily in case of atrial fibrillation episodes  - continue verapamil SR 120mg daily for now  - we discussed the ongoing importance of lifestyle modification (maintaining a healthy weight, sleep apnea diagnosis and management, alcohol avoidance) as part of a long term strategy for atrial fibrillation management  - sleep medicine consultation previously ordered  - given her [id number] 0-1, long term therapeutic anticoagulation is not indicated    Follow up: EP NP follow-up in 1 year, sooner if needed.  She is concerned re: BP - will recycle - further evaluation and  management would be deferred to PMD         History of Present Illness   Cherie Montgomery is a 37 year old female with paroxysmal atrial fibrillation, APLAS, hypothyroidism, obesity presenting for follow-up regarding atrial fibrillation.    Mrs. Montgomery notes a first episode of atrial fibrillation around 2021, a second episode 7/15/2022, and a third episode 12/18/2022 - she has needed cardioversion 7/15/2022 and 12/18/2022.  Her longest episode to date prior to DCCV was ~6hrs.  All occurrences have occurred at night and awaken her from sleep.  She reports 2 episodes of atrial fibrillation over the pats year (10-45min duration), and sporadic isolated and nonsustained palpitations.  She has noted activity to be a trigger, though this is only intermittent, and red wine.      She denies chest pain, syncope.  She has two young children.  She works in a salon.       Physical Examination  Review of Systems   VITALS: BP (!) 154/82 (BP Location: Left arm, Patient Position: Sitting, Cuff Size: Adult Large)   Pulse 88   Wt 129.7 kg (286 lb)   SpO2 96%   BMI 49.09 kg/m    Wt Readings from Last 3 Encounters:   01/29/24 132.5 kg (292 lb 3.2 oz)   04/12/23 127 kg (280 lb)   03/01/23 127 kg (280 lb)     CONSTITUTIONAL: well nourished, comfortable, no distress  EYES:  Conjunctivae pink, sclerae clear.    E/N/T:  Oral mucosa pink  RESPIRATORY:  Respiratory effort is normal  CARDIOVASCULAR:  normal S1 and S2  GASTROINTESTINAL:  Abdomen without masses or tenderness  EXTREMITIES:  No clubbing or cyanosis.    MUSCULOSKELETAL:  Overall grossly normal muscle strength  SKIN:  Overall, skin warm and dry, no lesions.  NEURO/PSYCH:  Oriented x 3 with normal affect.     Constitutional:  No weight loss or loss of appetite    Eyes:  No difficulty with vision, no double vision, no dry eyes  ENT:  No sore throat, difficulty swallowing; changes in hearing or tinnitus  Cardiovascular: As detailed above  Respiratory:  No cough  Musculoskeletal  No  joint pain, muscle aches  Neurologic:  No syncope, lightheadedness, fainting spells   Hematologic: No easy bruising, excessive bleeding tendency   Gastrointestinal:  No jaundice, abdominal pain or abdominal bloating  Genitourinary: No changes in urinary habits, no trouble urinating    Psychiatric: No anxiety or depression      Medical History  Surgical History   Past Medical History:   Diagnosis Date    Anti-phospholipid antibody syndrome (H24)     Atrial fibrillation (H)     Dysmenorrhea     Hypertension     with second full term pregnancy    Obese     Thyroid disease     hypothyroid    Past Surgical History:   Procedure Laterality Date    DILATION AND CURETTAGE N/A 2018    Procedure: SUCTION DILATION AND CURETTAGE;  Surgeon: Roxanna Moody MD;  Location: [facility];  Service:     DILATION AND CURETTAGE, OPERATIVE HYSTEROSCOPY WITH MORCELLATOR, COMBINED N/A 2019    Procedure: HYSTEROSCOPY, POLYPECTOMY, POSSIBLE DILATION AND CURETTAGE (GUY [email or web address] MORCELLATOR AND FLUID MANAGEMENT);  Surgeon: Katia Rm MD;  Location:  OR    EXCISION, EXOSTOSIS, RETROCALCANEAL Left 2023    Procedure: Excision retrocalcaneal spur left foot;  Surgeon: Jomar Painter DPM;  [place] [facility]    [place]N SURGERY      D & C    TOE SURGERY           Family History Social History   Family History   Problem Relation Age of Onset    Bipolar Disorder Other     Depression Other     Alcoholism Other     Attention Deficit Disorder Other         Social History     Tobacco Use    Smoking status: Former     Current packs/day: 0.00     Types: Cigarettes     Quit date: 2018     Years since quittin.2     Passive exposure: Never    Smokeless tobacco: Never   Vaping Use    Vaping status: Never Used   Substance Use Topics    Alcohol use: Not Currently     Comment: 2/month    Drug use: Never         Medications  Allergies     Current Outpatient Medications:     aspirin 81 MG EC tablet, Take 81 mg by mouth  daily, Disp: , Rfl:     levothyroxine (SYNTHROID/LEVOTHROID) 50 MCG tablet, Take 50 mcg by mouth daily, Disp: , Rfl:     magnesium 250 MG tablet, Take 1 tablet by mouth daily, Disp: , Rfl:     predniSONE (DELTASONE) 10 MG tablet, 40,30,20,10 mg x3 days each (Patient not taking: Reported on 1/29/2024), Disp: 30 tablet, Rfl: 0    Prenatal Vit-Fe Fumarate-FA (PRENATAL PO), Take 1 tablet by mouth daily (Patient not taking: Reported on 1/29/2024), Disp: , Rfl:     verapamil ER (CALAN-SR) 120 MG CR tablet, TAKE 1 TABLET(120 MG) BY MOUTH AT BEDTIME, Disp: 90 tablet, Rfl: 0    Vitamin D3 (CHOLECALCIFEROL) 125 MCG (5000 UT) tablet, Take 1 tablet by mouth daily, Disp: , Rfl:    No Known Allergies       Lab Results    Chemistry CBC Cardiac Enzymes/BNP/TSH/INR   Recent Labs   Lab Test 12/18/22  0824      POTASSIUM 4.6   CHLORIDE 110*   CO2 20*      BUN 14   CR 0.77   GFRESTIMATED >90   BRIEN 9.3     Recent Labs   Lab Test 12/18/22  0824 07/15/22  0219 04/19/22  1117   CR 0.77 0.97 0.61  0.62          Recent Labs   Lab Test 12/18/22  0824   WBC 9.2   HGB 14.2   HCT 41.9   MCV 87        Recent Labs   Lab Test 12/18/22  0824 07/15/22  0219 04/21/22  0030   HGB 14.2 13.7 11.4*    Recent Labs   Lab Test 07/15/22  0219   TROPONINI <0.01     No results for input(s): BNP, NTBNPI, NTBNP in the last 48052 hours.  Recent Labs   Lab Test 12/18/22  0824   TSH 5.16*     Recent Labs   Lab Test 08/12/20  1106   INR 0.99         Data Review    ECGs (tracings independently reviewed)  12/18/2022 - SR 76bpm  12/18/2022 - AF, ventricular rate 157bpm  7/15/2022 - AF, ventricular rate 150bpm    [employer] monitoring from 1/22/2023 to 2/4/2023 (duration 12d 18h).  Predominant underlying rhythm was sinus rhythm, 50 to 134bpm, average 85bpm.  7 episodes of nonsustained supraventricular tachycardia - fastest 9.0s maximum 222bpm, longest 15.7s average 124bpm.  These appear to be rapid atrial tachycardia.  No sustained tachyarrhythmias.  No  atrial fibrillation.  There were no pauses of greater than 3 seconds.  Rare supraventricular ectopic beats (<1%).  Rare premature ventricular contractions (<1%).  Symptom triggers correlated to sinus rhythm with and without SVEs, nonsustained SVT, VEs.    Cardiac event monitoring from 7/25/2022 to 8/3/2022 (monitored duration 3d 22h 38m) (independently reviewed)  Baseline rhythm was sinus rhythm 78bpm.    Reported heart rate range 50 to 120bpm, average 74bpm.  2 symptom triggers (other, palpitations) correlated to sinus rhythm 69-87bpm.  No nonsustained or sustained tachyarrhythmias.  No atrial fibrillation.  There were no pauses noted.  Supraventricular and ventricular ectopic beat frequency are not reported on this monitoring modality.      6/5/2021 TTE  Visually Estimated EF: 60-65%   Normal LV size and systolic function.   Normal RV size and systolic function.   No significant valvular heart disease noted.     1/24/2023 MPI    The nuclear stress test is negative for inducible myocardial ischemia or infarction.    The left ventricular ejection fraction at stress is 70%.    The patient is at a low risk of future cardiac ischemic events.    The  images demonstrate breast attenuation.    There is no prior study for comparison.         Cc: Saran Vargas MD, Katia Rm MD Amila Dilusha William, MD  4/10/2024  9:43 AM    Thank you for allowing me to participate in the care of your patient.      Sincerely,     Shira Lowe MD     [facility]  cc:   Shira Lowe MD  [address]  [place] [address]

## 2024-04-10 NOTE — PATIENT INSTRUCTIONS
[facility]  Cardiac Electrophysiology  [address]  [place] [address]   Office: [phone]  Fax: [phone]       Thank you for seeing us in clinic today - it is a pleasure to be a part of your care team.  Below is a summary of our plan from today's visit.       You have paroxysmal atrial fibrillation.  We reviewed physiology and management options including antiarrhythmic drug therapy (either suppressive of pill in pocket), catheter ablation and lifestyle modification.  We will plan for the following:  - flecainide 150mg once daily in case of atrial fibrillation episodes  - continue verapamil SR 120mg daily  - continue to work on healthy lifestyles and aerobic activity     Please do not hesitate to be in touch with our office at [phone] with any questions that may arise.       Thank you for trusting us with your care,    Shira Lowe MD  Clinical Cardiac Electrophysiology  [facility]  [address]  [place] [address]   Office: [phone]  Fax: [phone]              ATRIAL FIBRILLATION: Patient Information    What is atrial fibrillation?  Atrial fibrillation (AF, A-fib) is a common heart rhythm problem (arrhythmia) occurring within the upper chambers of the heart (the atria).  In normal rhythm, the upper and lower chambers of the heart are electrically driven to contract in a coordinated sequence.  In atrial fibrillation, the atria lose their ability to contract because of rapid and chaotic electrical activity.  The lower chambers of the heart (the ventricles) continue to pump blood throughout the body, though with irregular and often faster rate due to the chaotic activity within the atria.        How do I know if I have atrial fibrillation?   Some people may feel their heart beating faster, harder, or irregularly while in atrial fibrillation.  Others may be lightheaded, fatigued, feel weak or tired or become  more short of breath especially with activities.  Some patients have no symptoms at all.  Atrial fibrillation may be found due to an irregular pulse or on an electrocardiogram (ECG). Atrial fibrillation can start and stop on its own, and episodes can last from seconds to several months.      How common is atrial fibrillation?   An estimated 3-6 million people in the [place] have atrial fibrillation.  Atrial fibrillation is a common heart rhythm problem for older persons, affecting as estimated 12-15% of people over the age of 65 years of age.    What causes atrial fibrillation?   Age is the most important risk factor for atrial fibrillation.  Atrial fibrillation is more common in people with other heart disease, high blood pressure, diabetes, obesity, sleep apnea and in people who regularly consume alcohol.  Surgery, lung disease, or thyroid problems can lead to atrial fibrillation.  Atrial fibrillation has multiple possible causes, and in most cases a single cause cannot be found.  Atrial fibrillation is a progressive condition, usually starting with at an early stage with short and infrequent episodes.  In later stages of disease, more frequent and longer lasting episodes of atrial fibrillation occur, ultimately culminating in episodes which do not spontaneously terminate.  Generally, more enlargement and scarring within the upper chambers of the heart is observed as atrial fibrillation progresses from early to late-stage disease.    How is atrial fibrillation diagnosed and evaluated?    Because of its start-stop nature, atrial fibrillation can be challenging to diagnose.  Atrial fibrillation is most commonly diagnosed via cardiac rhythm recordings - either an ECG or wearable cardiac rhythm monitor.  For patients with pacemakers, defibrillators or implantable loop recorders, atrial fibrillation may be recorded via these devices.  Recently, commercially available devices (eg. Apple Watch, Kardia device,  certain FitBit devices, others) can allow patients to take 30 second cardiac rhythm recordings which may document atrial fibrillation.  Once atrial fibrillation is diagnosed, additional tests include blood tests and an echocardiogram.  The echocardiogram uses ultrasound to look at your heart to assess your cardiac function and evaluate for other heart disease.  Additional evaluation may include CT or MRI studies.    Is atrial fibrillation dangerous?   Atrial fibrillation is not usually a life-threatening arrhythmia.  The most serious consequences of atrial fibrillation including stroke and worsening of overall cardiac function.  While in atrial fibrillation, the upper cardiac chambers do not contract normally, resulting in slower blood flow and increased risk of clot formation.  If this blood clot becomes detached from the heart a stroke can occur.  Unfortunately, stroke can be the first sign of atrial fibrillation for some people.  With a stroke, you may notice abnormal sensation, weakness on one side of the body or face, changes in your vision or speech.  If you have any of these signs, you should contact EMS and be evaluated in an emergency room as soon as possible.      How is atrial fibrillation treated?     Several treatment options exist for suppressing atrial fibrillation - however, it is not an easily curable arrhythmia.  The first goal in managing atrial fibrillation is to minimize stroke risk.  The second goal is to improve symptoms associated with atrial fibrillation.  Finally, in patients with reduced cardiac function, maintaining normal rhythm can help improve cardiac function.      Blood thinners are used to reduce the risk of stroke in patients with high estimated stroke risk related to atrial fibrillation.  For patients at higher risk of bleeding related to blood thinner use, implantable devices may be an option to reduce stroke risk without the need for long term blood thinner use.      Atrial  fibrillation can be managed via two strategies: rate control and rhythm control.  In a rate control strategy, continued atrial fibrillation is accepted and medications (eg. beta-blockers or calcium channel blockers) are used to control the lower chamber rate.  In a rhythm control strategy, anti-arrhythmic medications or catheter ablation are used to maintain normal cardiac rhythm and slow disease progression by suppressing atrial fibrillation.  A procedure called a cardioversion, in which an electric shock is delivered through patches placed on the chest wall while under deep sedation, can be performed to temporarily restore normal cardiac rhythm, though does not address the chance of atrial fibrillation recurrence.  Treatments are more effective for earlier rather than later stage atrial fibrillation.  Lifestyle modifications (maintaining a healthy weight, aerobic exercise, diagnosing and treating sleep apnea, and minimizing alcohol intake) are important elements of atrial fibrillation rhythm control.     What is catheter ablation for atrial fibrillation?  Cardiac catheter ablation is a commonly performed, minimally invasive procedure performed by a cardiac electrophysiologist to treat many different cardiac rhythm abnormalities.  During catheter ablation, long, thin, flexible tubes are advanced into the heart via small sheaths inserted into the femoral veins and thermal energy (either heating or cooling) is applied within the heart to disrupt abnormal electrical activity.  Atrial fibrillation ablation is performed under general anesthesia, with procedures generally taking approximately 2-3 hours.  Patients are typically observed for 3-5 hours after the ablation, and in most cases can be discharged home the same day.  Atrial fibrillation ablation is associated with better outcomes (mortality, cardiovascular hospitalizations, atrial arrhythmia recurrences) compared to antiarrhythmic drug therapy.  However, atrial  fibrillation recurrences are not uncommon, and repeat catheter ablation may be offered.  Your electrophysiology team can review atrial fibrillation ablation, anticipated success rates, risks, and recovery expectations with you.    What are anti-arrhythmic medications?  Anti-arrhythmic medications are specialized drugs which alter cardiac electrical functioning to suppress arrhythmias.  There are several anti-arrhythmic medications available, each with its own success rate and side effects.  Some anti-arrhythmic medications are less effective though safer to use, others are more effective though have serious potential toxicities.  Atrial fibrillation recurrences are common and may require dose adjustment or change in antiarrhythmic therapy.  Your electrophysiology team will carefully consider which medication would be the best and safest for your particular case.      Can I live a normal life?    The goal of atrial fibrillation management is for patients to live normal lives without being limited by symptoms related to atrial fibrillation.    Are any additional educational resources available?  There are a number of excellent atrial fibrillation education resources available to you online.  A few options you may wish to review include:  hrsonline.org/guide-atrial-fibrillation  afibmatters.org  getsmartaboutafib.com  stopaf.com    What comes next?    Consider your management options and let us know how we can help in your decision process.  Please take medications as they have been prescribed.  You should also get any tests that may have been ordered for you.      When to Call Your Doctor or seek emergency care:  Call your doctor or seek emergency care if you have any significant changes with the following:  Weakness  Dizziness  Fainting  Fatigue  Shortness of breath  Chest pain with increased activity  If you are concerned that your heart rate is too fast or too slow  Bleeding that does not stop in 10  minutes  Coughing or throwing up blood  Bloody diarrhea or bleeding hemorrhoids  Dark-colored urine or black stool  Allergic reactions:  Rash  Itching  Swelling  Trouble breathing or swallowing      Please call the [facility] at [phone] if you have concerns about your symptoms, your medicines, or your follow-up appointments.   158

## 2024-04-10 NOTE — PROGRESS NOTES
[facility]  Cardiac Electrophysiology  [address]  [place] [address]   Office: [phone]  Fax: [phone]     Patient: Cherie Montgomery   : 1985     CHIEF COMPLAINT/REASON FOR VISIT  Paroxysmal atrial fibrillation    Assessment/Recommendations   Cherie Montgomery is a 38 year old female with paroxysmal atrial fibrillation, APLAS, hypothyroidism, obesity presenting for follow-up regarding atrial fibrillation.    Paroxysmal atrial fibrillation - symptomatic with palpitations  [id number] 0-1  We reviewed atrial fibrillation physiology and management considerations including managing stroke risk, rate control, cardioversion, antiarrhythmic drug therapy, and catheter ablation.  We discussed atrial fibrillation ablation procedures, anticipated success rates, the potential need for re-do ablation vs addition of anti-arrhythmic drugs, procedural risks (including groin bleeding, tamponade, phrenic or esophageal injury, stroke, pulmonary vein stenosis) and recovery expectations.  She would prefer to have access to pill-in-pocket antiarrhythmic drug therapy, and continue verapamil   - flecainide 150mg once daily in case of atrial fibrillation episodes  - continue verapamil SR 120mg daily for now  - we discussed the ongoing importance of lifestyle modification (maintaining a healthy weight, sleep apnea diagnosis and management, alcohol avoidance) as part of a long term strategy for atrial fibrillation management  - sleep medicine consultation previously ordered  - given her [id number] 0-1, long term therapeutic anticoagulation is not indicated    Follow up: EP NP follow-up in 1 year, sooner if needed.  She is concerned re: BP - will recycle - further evaluation and management would be deferred to PMD         History of Present Illness   Cherie Montgomery is a 37 year old female with paroxysmal atrial fibrillation, APLAS, hypothyroidism, obesity presenting for follow-up  regarding atrial fibrillation.    Mrs. Montgomery notes a first episode of atrial fibrillation around 2021, a second episode 7/15/2022, and a third episode 12/18/2022 - she has needed cardioversion 7/15/2022 and 12/18/2022.  Her longest episode to date prior to DCCV was ~6hrs.  All occurrences have occurred at night and awaken her from sleep.  She reports 2 episodes of atrial fibrillation over the pats year (10-45min duration), and sporadic isolated and nonsustained palpitations.  She has noted activity to be a trigger, though this is only intermittent, and red wine.      She denies chest pain, syncope.  She has two young children.  She works in a salon.       Physical Examination  Review of Systems   VITALS: BP (!) 154/82 (BP Location: Left arm, Patient Position: Sitting, Cuff Size: Adult Large)   Pulse 88   Wt 129.7 kg (286 lb)   SpO2 96%   BMI 49.09 kg/m    Wt Readings from Last 3 Encounters:   01/29/24 132.5 kg (292 lb 3.2 oz)   04/12/23 127 kg (280 lb)   03/01/23 127 kg (280 lb)     CONSTITUTIONAL: well nourished, comfortable, no distress  EYES:  Conjunctivae pink, sclerae clear.    E/N/T:  Oral mucosa pink  RESPIRATORY:  Respiratory effort is normal  CARDIOVASCULAR:  normal S1 and S2  GASTROINTESTINAL:  Abdomen without masses or tenderness  EXTREMITIES:  No clubbing or cyanosis.    MUSCULOSKELETAL:  Overall grossly normal muscle strength  SKIN:  Overall, skin warm and dry, no lesions.  NEURO/PSYCH:  Oriented x 3 with normal affect.     Constitutional:  No weight loss or loss of appetite    Eyes:  No difficulty with vision, no double vision, no dry eyes  ENT:  No sore throat, difficulty swallowing; changes in hearing or tinnitus  Cardiovascular: As detailed above  Respiratory:  No cough  Musculoskeletal  No joint pain, muscle aches  Neurologic:  No syncope, lightheadedness, fainting spells   Hematologic: No easy bruising, excessive bleeding tendency   Gastrointestinal:  No jaundice, abdominal pain or abdominal  bloating  Genitourinary: No changes in urinary habits, no trouble urinating    Psychiatric: No anxiety or depression      Medical History  Surgical History   Past Medical History:   Diagnosis Date    Anti-phospholipid antibody syndrome (H24)     Atrial fibrillation (H)     Dysmenorrhea     Hypertension     with second full term pregnancy    Obese     Thyroid disease     hypothyroid    Past Surgical History:   Procedure Laterality Date    DILATION AND CURETTAGE N/A 2018    Procedure: SUCTION DILATION AND CURETTAGE;  Surgeon: Roxanna Moody MD;  Location: [facility];  Service:     DILATION AND CURETTAGE, OPERATIVE HYSTEROSCOPY WITH MORCELLATOR, COMBINED N/A 2019    Procedure: HYSTEROSCOPY, POLYPECTOMY, POSSIBLE DILATION AND CURETTAGE ([employer] MORCELLATOR AND FLUID MANAGEMENT);  Surgeon: Katia Rm MD;  Location:  OR    EXCISION, EXOSTOSIS, RETROCALCANEAL Left 2023    Procedure: Excision retrocalcaneal spur left foot;  Surgeon: Jomar Painter DPM;  Location: [facility]    GYN SURGERY      D & C    TOE SURGERY           Family History Social History   Family History   Problem Relation Age of Onset    Bipolar Disorder Other     Depression Other     Alcoholism Other     Attention Deficit Disorder Other         Social History     Tobacco Use    Smoking status: Former     Current packs/day: 0.00     Types: Cigarettes     Quit date: 2018     Years since quittin.2     Passive exposure: Never    Smokeless tobacco: Never   Vaping Use    Vaping status: Never Used   Substance Use Topics    Alcohol use: Not Currently     Comment: 2/month    Drug use: Never         Medications  Allergies     Current Outpatient Medications:     aspirin 81 MG EC tablet, Take 81 mg by mouth daily, Disp: , Rfl:     levothyroxine (SYNTHROID/LEVOTHROID) 50 MCG tablet, Take 50 mcg by mouth daily, Disp: , Rfl:     magnesium 250 MG tablet, Take 1 tablet by mouth daily, Disp: , Rfl:     predniSONE  (DELTASONE) 10 MG tablet, 40,30,20,10 mg x3 days each (Patient not taking: Reported on 1/29/2024), Disp: 30 tablet, Rfl: 0    Prenatal Vit-Fe Fumarate-FA (PRENATAL PO), Take 1 tablet by mouth daily (Patient not taking: Reported on 1/29/2024), Disp: , Rfl:     verapamil ER (CALAN-SR) 120 MG CR tablet, TAKE 1 TABLET(120 MG) BY MOUTH AT BEDTIME, Disp: 90 tablet, Rfl: 0    Vitamin D3 (CHOLECALCIFEROL) 125 MCG (5000 UT) tablet, Take 1 tablet by mouth daily, Disp: , Rfl:    No Known Allergies       Lab Results    Chemistry CBC Cardiac Enzymes/BNP/TSH/INR   Recent Labs   Lab Test 12/18/22  0824      POTASSIUM 4.6   CHLORIDE 110*   CO2 20*      BUN 14   CR 0.77   GFRESTIMATED >90   BRIEN 9.3     Recent Labs   Lab Test 12/18/22  0824 07/15/22  0219 04/19/22  1117   CR 0.77 0.97 0.61  0.62          Recent Labs   Lab Test 12/18/22  0824   WBC 9.2   HGB 14.2   HCT 41.9   MCV 87        Recent Labs   Lab Test 12/18/22  0824 07/15/22  0219 04/21/22  0030   HGB 14.2 13.7 11.4*    Recent Labs   Lab Test 07/15/22  0219   TROPONINI <0.01     No results for input(s): BNP, NTBNPI, NTBNP in the last 56940 hours.  Recent Labs   Lab Test 12/18/22  0824   TSH 5.16*     Recent Labs   Lab Test 08/12/20  1106   INR 0.99         Data Review    ECGs (tracings independently reviewed)  12/18/2022 - SR 76bpm  12/18/2022 - AF, ventricular rate 157bpm  7/15/2022 - AF, ventricular rate 150bpm    Zio monitoring from 1/22/2023 to 2/4/2023 (duration 12d 18h).  Predominant underlying rhythm was sinus rhythm, 50 to 134bpm, average 85bpm.  7 episodes of nonsustained supraventricular tachycardia - fastest 9.0s maximum 222bpm, longest 15.7s average 124bpm.  These appear to be rapid atrial tachycardia.  No sustained tachyarrhythmias.  No atrial fibrillation.  There were no pauses of greater than 3 seconds.  Rare supraventricular ectopic beats (<1%).  Rare premature ventricular contractions (<1%).  Symptom triggers correlated to sinus rhythm  with and without SVEs, nonsustained SVT, VEs.    Cardiac event monitoring from 7/25/2022 to 8/3/2022 (monitored duration 3d 22h 38m) (independently reviewed)  Baseline rhythm was sinus rhythm 78bpm.    Reported heart rate range 50 to 120bpm, average 74bpm.  2 symptom triggers (other, palpitations) correlated to sinus rhythm 69-87bpm.  No nonsustained or sustained tachyarrhythmias.  No atrial fibrillation.  There were no pauses noted.  Supraventricular and ventricular ectopic beat frequency are not reported on this monitoring modality.      6/5/2021 TTE  Visually Estimated EF: 60-65%   Normal LV size and systolic function.   Normal RV size and systolic function.   No significant valvular heart disease noted.     1/24/2023 MPI    The nuclear stress test is negative for inducible myocardial ischemia or infarction.    The left ventricular ejection fraction at stress is 70%.    The patient is at a low risk of future cardiac ischemic events.    The  images demonstrate breast attenuation.    There is no prior study for comparison.         Cc: Saran Vargas MD, Katia Rm MD Amila Dilusha William, MD  4/10/2024  9:43 AM

## 2024-06-24 DIAGNOSIS — I48.0 PAROXYSMAL ATRIAL FIBRILLATION (H): ICD-10-CM

## 2024-06-24 RX ORDER — VERAPAMIL HYDROCHLORIDE 120 MG/1
TABLET, FILM COATED, EXTENDED RELEASE ORAL
Qty: 90 TABLET | Refills: 3 | Status: SHIPPED | OUTPATIENT
Start: 2024-06-24

## 2024-07-24 ENCOUNTER — OFFICE VISIT (OUTPATIENT)
Dept: FAMILY MEDICINE | Facility: CLINIC | Age: 39
End: 2024-07-24
Payer: COMMERCIAL

## 2024-07-24 VITALS
BODY MASS INDEX: 50.02 KG/M2 | RESPIRATION RATE: 16 BRPM | SYSTOLIC BLOOD PRESSURE: 126 MMHG | OXYGEN SATURATION: 97 % | HEIGHT: 64 IN | HEART RATE: 95 BPM | DIASTOLIC BLOOD PRESSURE: 74 MMHG | WEIGHT: 293 LBS | TEMPERATURE: 98 F

## 2024-07-24 DIAGNOSIS — R22.1 NECK MASS: Primary | ICD-10-CM

## 2024-07-24 PROCEDURE — 99213 OFFICE O/P EST LOW 20 MIN: CPT | Performed by: NURSE PRACTITIONER

## 2024-07-24 ASSESSMENT — PAIN SCALES - GENERAL: PAINLEVEL: NO PAIN (1)

## 2024-07-24 ASSESSMENT — PATIENT HEALTH QUESTIONNAIRE - PHQ9
SUM OF ALL RESPONSES TO PHQ QUESTIONS 1-9: 2
SUM OF ALL RESPONSES TO PHQ QUESTIONS 1-9: 2
10. IF YOU CHECKED OFF ANY PROBLEMS, HOW DIFFICULT HAVE THESE PROBLEMS MADE IT FOR YOU TO DO YOUR WORK, TAKE CARE OF THINGS AT HOME, OR GET ALONG WITH OTHER PEOPLE: NOT DIFFICULT AT ALL

## 2024-07-24 ASSESSMENT — ENCOUNTER SYMPTOMS: NECK PAIN: 1

## 2024-07-24 NOTE — PROGRESS NOTES
"Eliceo Crespo is a 39 year old, presenting for the following health issues:  Musculoskeletal Problem (Lump or lymph nodes on neck, right side of neck. The side of the neck that if she sleeps wrong, with menstrual cycle, headaches, hx of Migraines. Has been seen by Chiropractor months ago)      7/24/2024     4:16 PM   Additional Questions   Roomed by  LPN     History of Present Illness       Reason for visit:  Neck lumps  Symptom onset:  More than a month    She eats 2-3 servings of fruits and vegetables daily.She consumes 2 sweetened beverage(s) daily.She exercises with enough effort to increase her heart rate 9 or less minutes per day.  She exercises with enough effort to increase her heart rate 3 or less days per week.   She is taking medications regularly.     Lumps on right side of neck for several months or more.  Tender at times.  Size variable.  Gets headaches- improved with chiropractic treatments.     Review of Systems  Constitutional, neuro, ENT, endocrine, pulmonary, cardiac, gastrointestinal, genitourinary, musculoskeletal, integument and psychiatric systems are negative, except as otherwise noted.      Objective    /74 (BP Location: Left arm, Patient Position: Sitting, Cuff Size: Adult Regular)   Pulse 95   Temp 98  F (36.7  C) (Oral)   Resp 16   Ht 1.626 m (5' 4\")   Wt 133.7 kg (294 lb 12.8 oz)   LMP 07/05/2024 (Approximate)   SpO2 97%   Breastfeeding No   BMI 50.60 kg/m    Body mass index is 50.6 kg/m .  Physical Exam   GENERAL: alert and no distress  HENT: ear canals and TM's normal, nose and mouth without ulcers or lesions  NECK: thyroid normal to palpation and two lumps on right side of neck  RESP: lungs clear to auscultation - no rales, rhonchi or wheezes  CV: regular rate and rhythm, normal S1 S2, no S3 or S4, no murmur, click or rub, no peripheral edema   MS: no gross musculoskeletal defects noted, no edema  NEURO: Normal strength and tone, mentation intact and speech " normal  PSYCH: mentation appears normal, affect normal/bright    Office Visit on 01/29/2024   Component Date Value Ref Range Status    Sodium 01/29/2024 137  135 - 145 mmol/L Final    Reference intervals for this test were updated on 09/26/2023 to more accurately reflect our healthy population. There may be differences in the flagging of prior results with similar values performed with this method. Interpretation of those prior results can be made in the context of the updated reference intervals.     Potassium 01/29/2024 4.7  3.4 - 5.3 mmol/L Final    Carbon Dioxide (CO2) 01/29/2024 28  22 - 29 mmol/L Final    Anion Gap 01/29/2024 6 (L)  7 - 15 mmol/L Final    Urea Nitrogen 01/29/2024 14.2  6.0 - 20.0 mg/dL Final    Creatinine 01/29/2024 0.76  0.51 - 0.95 mg/dL Final    GFR Estimate 01/29/2024 >90  >60 mL/min/1.73m2 Final    Calcium 01/29/2024 9.4  8.6 - 10.0 mg/dL Final    Chloride 01/29/2024 103  98 - 107 mmol/L Final    Glucose 01/29/2024 99  70 - 99 mg/dL Final    Alkaline Phosphatase 01/29/2024 109  40 - 150 U/L Final    Reference intervals for this test were updated on 11/14/2023 to more accurately reflect our healthy population. There may be differences in the flagging of prior results with similar values performed with this method. Interpretation of those prior results can be made in the context of the updated reference intervals.    AST 01/29/2024 25  0 - 45 U/L Final    Reference intervals for this test were updated on 6/12/2023 to more accurately reflect our healthy population. There may be differences in the flagging of prior results with similar values performed with this method. Interpretation of those prior results can be made in the context of the updated reference intervals.    ALT 01/29/2024 21  0 - 50 U/L Final    Reference intervals for this test were updated on 6/12/2023 to more accurately reflect our healthy population. There may be differences in the flagging of prior results with similar  values performed with this method. Interpretation of those prior results can be made in the context of the updated reference intervals.      Protein Total 01/29/2024 8.4 (H)  6.4 - 8.3 g/dL Final    Albumin 01/29/2024 4.6  3.5 - 5.2 g/dL Final    Bilirubin Total 01/29/2024 0.3  <=1.2 mg/dL Final       A/P:  1. Neck mass  Likely reactive lymph nodes  - CT Soft Tissue Neck w Contrast; Future      Signed Electronically by: Nette Lawrence, FREEDOM

## 2024-11-19 NOTE — LACTATION NOTE
"Lactation visit with Cherie and baby Steve.    Cherie shares there her first baby was over 10 lbs and required supplementation. So Cherie , pumped, and then bottle fed infant. That was such an exhausting process, she would really like to focus on breastfeeding.    Infant has been nursing well, clusterfed all night, and is at a 9.6% weight loss, has appropriate wet/poopy diapers for GA. LC observed infant breastfeeding, latch looks great, Cherie's nipples are not sore, audible swallows with breastfeeding. Pediatrician has already been into visit this am, and Cherie expressed her desire to focus on breastfeeding, not introducing supplementation at this time. Peds was in support of Cherie's plan, she has a Peds visit scheduled on Monday (2 days).    LC suggested that Cherie track infant's feeding sessions, 8 feedings in 24 hours minimum, always feed on demand, and wake infant 3 hours to offer to feed if he hasn't woken on his own. We also looked at feeding log, LC emphasizing wet/dirty diaper counts should be on target. AND if infant is not breastfeeding well, becomes lethargic and is not having appropriate wet/poopy diapers--then it would be indicated to start supplementation (EBM or formula).    Cherie states that she doesn't plan on \"starving her baby\" and will supplement if it becomes clear it is necessary. But really wants to focus on strictly breastfeeding.    We reviewed  breastfeeding basics:   1) Watch for early feeding cues (licking lips, stirring or rooting, sucking movement with mouth, hands to mouth) and always breast feed on DEMAND.  2) Infant should breastfeed a minimum of 8 times in 24 hours. If it has been 3 hours since last breast feeding session, un-swaddle infant and begin skin to skin to entice infant to nurse.    Reviewed breast feeding section in our \"Guide to Postpartum and  Care.\" Encouraged Cherie to read about  feeding patterns/behavior: paying special " "attention to understanding infant's cluster-feeding (when and why's).     Discussed physiology of milk production from colostrum through milk coming in and how the breasts should begin to feel \"heavy or full\" between day 3-5. Educated to infant satiety signs; encouraged listening for audible swallows along with watching for changes in infant's stool color. Discussed normal infant weight loss and when infant should be back to birth weight.     Discussed pumping (when it's helpful, when it's necessary, and when to begin pumping for milk storage), along with when to introduce a bottle. Cherie has a new breast pump for home use. Suggested \"Guide to Postpartum and [place] Care\" handbook is a great resource going forward for topics that include engorgement, plugged milk ducts, mastitis, safe sleep, and safety of baby.     Feeding plan recommendations: provide unlimited, on-demand breast feedings: At least 8-12 times/24 hours (reviewed early feeding cues). Encouraged on-going use of a feeding log or contreras to record feedings along with void/stool patterns. Avoid pacifiers (until 1 month of age per AAP guidelines) and supplementation with formula unless medically indicated. Follow up with Pediatrician as requested and encouraged lactation follow up. Reviewed [place] outpatient lactation resources. Appreciative of visit.    Lisa Marquez RN, IBCLC            " [phone]

## 2024-12-01 ENCOUNTER — HEALTH MAINTENANCE LETTER (OUTPATIENT)
Age: 39
End: 2024-12-01

## 2025-03-18 ENCOUNTER — OFFICE VISIT (OUTPATIENT)
Dept: FAMILY MEDICINE | Facility: CLINIC | Age: 40
End: 2025-03-18
Payer: COMMERCIAL

## 2025-03-18 VITALS
RESPIRATION RATE: 14 BRPM | TEMPERATURE: 97.7 F | BODY MASS INDEX: 48.82 KG/M2 | WEIGHT: 293 LBS | OXYGEN SATURATION: 97 % | HEART RATE: 84 BPM | HEIGHT: 65 IN | DIASTOLIC BLOOD PRESSURE: 90 MMHG | SYSTOLIC BLOOD PRESSURE: 136 MMHG

## 2025-03-18 DIAGNOSIS — E03.9 HYPOTHYROIDISM, UNSPECIFIED TYPE: Primary | ICD-10-CM

## 2025-03-18 DIAGNOSIS — I48.0 PAROXYSMAL ATRIAL FIBRILLATION (H): ICD-10-CM

## 2025-03-18 DIAGNOSIS — R03.0 ELEVATED BLOOD PRESSURE READING WITHOUT DIAGNOSIS OF HYPERTENSION: ICD-10-CM

## 2025-03-18 PROCEDURE — 99214 OFFICE O/P EST MOD 30 MIN: CPT

## 2025-03-18 PROCEDURE — 84439 ASSAY OF FREE THYROXINE: CPT

## 2025-03-18 PROCEDURE — 84443 ASSAY THYROID STIM HORMONE: CPT

## 2025-03-18 PROCEDURE — 3075F SYST BP GE 130 - 139MM HG: CPT

## 2025-03-18 PROCEDURE — G2211 COMPLEX E/M VISIT ADD ON: HCPCS

## 2025-03-18 PROCEDURE — 1126F AMNT PAIN NOTED NONE PRSNT: CPT

## 2025-03-18 PROCEDURE — 3080F DIAST BP >= 90 MM HG: CPT

## 2025-03-18 PROCEDURE — 36415 COLL VENOUS BLD VENIPUNCTURE: CPT

## 2025-03-18 ASSESSMENT — PAIN SCALES - GENERAL: PAINLEVEL_OUTOF10: NO PAIN (0)

## 2025-03-18 NOTE — PROGRESS NOTES
Assessment & Plan     Hypothyroidism, unspecified type  Currently taking levothyroxine 50 mcg daily. Taking appropriately on an empty stomach first thing in the AM and waiting 30 minutes to 1 hour to eat.   Denies any s/s of hypo or hyperthyroidism.   Recheck TSH today, will adjust medication accordingly pending results.   - TSH WITH FREE T4 REFLEX    Paroxysmal atrial fibrillation (H)  Following with cardiology. Last saw them April of 2024, almost 1 year ago. Due for visit here within the next month. I will not make any changes to her medications which are verapamil daily and flecainide prn. Denies any CP, SOB, or sustained heart palpitations today.  CHADS2-VASc Score today of 1.  Not currently on any anticoagulation.  Patient is considering restarting her baby aspirin daily.    Elevated blood pressure reading without diagnosis of hypertension  Initial blood pressure for today's visit is 144/98, repeat is 136/90.  Patient is educated to routinely monitor her blood pressures over the next month and then present them to cardiology at her follow-up appointment.  The reason I asked her to do this is because if we make a formal diagnosis of hypertension that would put her [id number] score at 2 and then we would need to consider potential long-term anticoagulation.  I believe this conversation would be better suited with cardiology.  I did discuss starting a low-dose of medication with her today but she declined and would like to follow-up with cardiology.  Educated regarding exercise to promote healthy weight loss and low-sodium diet.      The longitudinal plan of care for the diagnosis(es)/condition(s) as documented were addressed during this visit. Due to the added complexity in care, I will continue to support Cherie in the subsequent management and with ongoing continuity of care.      Subjective   Cherie is a 39 year old, presenting for the following health issues:  Establish Care (Pt is not fasting.  "Establish care. PCP take over thyroid management. )        3/18/2025     8:44 AM   Additional Questions   Roomed by Liss SHORT LPN     History of Present Illness       Hypothyroidism:     Since last visit, patient describes the following symptoms::  Anxiety and Weight gain    Weight gain::  No weight gain    She eats 2-3 servings of fruits and vegetables daily.She consumes 2 sweetened beverage(s) daily.She exercises with enough effort to increase her heart rate 9 or less minutes per day.  She exercises with enough effort to increase her heart rate 3 or less days per week.   She is taking medications regularly.        History of afib: last saw cardiology April 2024. Takes verapamil  mg daily. Takes flecainide 150 mg once daily prn. Due for cardiology follow up within the month. Has used the flecainide x2. Generally will have symptoms at night, but has had episodes during the day. Denies any chest pain.     Hypothyroidism: Taking levothyroxine 50 mcg daily first thing in the AM on an empty stomach.       Review of Systems  Constitutional, HEENT, cardiovascular, pulmonary, gi and gu systems are negative, except as otherwise noted.      Objective    BP (!) 144/98 (BP Location: Right arm, Patient Position: Sitting, Cuff Size: Adult Regular)   Pulse 84   Temp 97.7  F (36.5  C) (Oral)   Resp 14   Ht 1.641 m (5' 4.6\")   Wt 133.4 kg (294 lb)   LMP 03/01/2025 (Approximate)   SpO2 97%   Breastfeeding No   BMI 49.53 kg/m    Body mass index is 49.53 kg/m .  Physical Exam   GENERAL: alert and no distress  EYES: Eyes grossly normal to inspection, conjunctivae and sclerae normal  NECK: no visualized asymmetry, masses, or scars  RESP: normal respiratory effort  SKIN: no suspicious lesions or rashes on visualized skin  PSYCH: mentation appears normal, affect normal/bright      BP Readings from Last 3 Encounters:   03/18/25 (!) 144/98   07/24/24 126/74   04/10/24 138/88           Signed Electronically by: Bianca Ventura, " FLY

## 2025-03-19 LAB
T4 FREE SERPL-MCNC: 1.04 NG/DL (ref 0.9–1.7)
TSH SERPL DL<=0.005 MIU/L-ACNC: 5.2 UIU/ML (ref 0.3–4.2)

## 2025-03-19 RX ORDER — LEVOTHYROXINE SODIUM 75 UG/1
75 TABLET ORAL
Qty: 90 TABLET | Refills: 0 | Status: SHIPPED | OUTPATIENT
Start: 2025-03-19

## 2025-03-24 ENCOUNTER — TELEPHONE (OUTPATIENT)
Dept: FAMILY MEDICINE | Facility: CLINIC | Age: 40
End: 2025-03-24

## 2025-03-24 NOTE — TELEPHONE ENCOUNTER
----- Message from Binaca Ventura sent at 3/24/2025  8:40 AM CDT -----  Please call and relay unviewed lab results.

## 2025-03-24 NOTE — TELEPHONE ENCOUNTER
" Left message to call back for: pt  Information to relay to patient: see below      \"Cherie,  Thyroid is under supplemented. I have sent a new prescription for levothyroxine 75 mcg to take daily in the AM. Please return to clinic in 2-3 months for a lab only appointment to recheck your thyroid levels.     Bianca Ventura PA-C\"    "

## 2025-03-27 ENCOUNTER — TELEPHONE (OUTPATIENT)
Dept: CARDIOLOGY | Facility: CLINIC | Age: 40
End: 2025-03-27

## 2025-03-27 DIAGNOSIS — I48.0 PAROXYSMAL ATRIAL FIBRILLATION (H): ICD-10-CM

## 2025-03-27 RX ORDER — VERAPAMIL HYDROCHLORIDE 120 MG/1
120 TABLET, FILM COATED, EXTENDED RELEASE ORAL AT BEDTIME
Qty: 30 TABLET | Refills: 0 | Status: SHIPPED | OUTPATIENT
Start: 2025-03-27

## 2025-03-27 NOTE — TELEPHONE ENCOUNTER
"Per scheduling notes: \"I want to have my heart looked at. I ve had some mild tightness here and there and I also want to discuss my blood pressure and stress.\"    Pt was scheduled to see BEW 3/27/25 and was a no show. Pt states that the tightness she feels is related to her anxiety and not really chest pain. Does say she's had intermittent pain in her arm but believes this is more muscle soreness than actual pain.     Offered RAC visit - patient does not feel any of her symptoms are urgent and hesitant to schedule with a sooner available SAMUEL.     Informed pt for continued refills and based on symptoms, pt should be seen sooner than 6/18/25 appt with RO. Discussed open availability with DO. Pt agreeable to 4/22/25 at 0730. 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 Reassured pt will place hold on appt and have scheduling secure slot. Understanding verbalized. Refill authorized for 30 days.     Message sent to scheduling requesting to finish scheduling per discussion had with pt. LMS  "

## 2025-03-27 NOTE — TELEPHONE ENCOUNTER
[facility] Call Center    Phone Message    May a detailed message be left on voicemail: yes     Reason for Call: Medication Refill Request    Has the patient contacted the pharmacy for the refill? Yes   Name of medication being requested: verapamil ER (CALAN-SR) 120 MG CR tablet   Provider who prescribed the medication: Dr. Lowe  Pharmacy: [id number] [address] [place] [facility] [address]    Date medication is needed: 4/1/2025      Action Taken: Other: Cardiology    Travel Screening: Not Applicable    Thank you!  [facility]       Date of Service:

## 2025-04-10 ENCOUNTER — MYC MEDICAL ADVICE (OUTPATIENT)
Dept: FAMILY MEDICINE | Facility: CLINIC | Age: 40
End: 2025-04-10
Payer: COMMERCIAL

## 2025-04-10 DIAGNOSIS — E03.9 HYPOTHYROIDISM, UNSPECIFIED TYPE: Primary | ICD-10-CM

## 2025-04-10 RX ORDER — LEVOTHYROXINE SODIUM 50 UG/1
50 TABLET ORAL
Qty: 90 TABLET | Refills: 3 | Status: SHIPPED | OUTPATIENT
Start: 2025-04-10

## 2025-04-10 NOTE — TELEPHONE ENCOUNTER
Spoke with the patient to relay the provider's message. She verbalized understanding.    Natalie Bean RN  [facility]

## 2025-04-10 NOTE — TELEPHONE ENCOUNTER
Called the patient to confirm her message was regarding her levothyroxine. She would like to go back down to 50 mcg as she feels since increasing it to 75 mcg she is experiencing more issues with her atrial fibrillation. As an example, she notes having to use her flecainide approximately 3 times in the last couple of weeks as opposed to her almost never using it over the course of a year.     She could not give an accurate description of how often it was occurring stating it feels like more than sometimes, but it's not all the time. She would like to go back down to 50 mcg of levothyroxine as she feels her body works better on that in regards to her Afib. Routing to the patient's PCP to review.     Natalie Bean RN  [facility]

## 2025-04-15 DIAGNOSIS — I48.0 PAROXYSMAL ATRIAL FIBRILLATION (H): ICD-10-CM

## 2025-04-16 RX ORDER — FLECAINIDE ACETATE 150 MG/1
TABLET ORAL
Qty: 20 TABLET | Refills: 1 | Status: SHIPPED | OUTPATIENT
Start: 2025-04-16

## 2025-04-17 ENCOUNTER — TELEPHONE (OUTPATIENT)
Dept: CARDIOLOGY | Facility: CLINIC | Age: 40
End: 2025-04-17
Payer: COMMERCIAL

## 2025-04-17 DIAGNOSIS — I48.0 PAROXYSMAL ATRIAL FIBRILLATION (H): ICD-10-CM

## 2025-04-17 DIAGNOSIS — I48.0 PAROXYSMAL ATRIAL FIBRILLATION (H): Primary | ICD-10-CM

## 2025-04-17 NOTE — TELEPHONE ENCOUNTER
[facility] Call Center    Phone Message    May a detailed message be left on voicemail: yes     Reason for Call: Medication Question or concern regarding medication   Prescription Clarification  Name of Medication: flecainide, verapamil   Prescribing Provider: Mynor   Pharmacy: [id number] [address] AT [facility] [address]     What on the order needs clarification? Patient wants to know if she can post pone a follow up to see a provider until June or if she should see [place] in April 2025? If patient can wait till June she would like refills. Also she is requesting an echocardiogram. Please call to discuss options.        Action Taken: Other: cardio    Travel Screening: Not Applicable     Date of Service:

## 2025-04-17 NOTE — TELEPHONE ENCOUNTER
"Mr. Pendleton, upcoming OV with you next week. Requests Echo. Please advise and give recommendations. Antoni CABALLERO   _______________________________________    PC with patient. Anxious, and perseverative. Requesting to see \"pictures of her heart\". Requesting an echocardiogram. Discussion with patient, and encouraged to have in person evaluation and care. Appt with Mr. Pendleton next week would be best option. Pt then went on to convey how busy her life is and she just can't see how she can make multiple trips to the clinic for an appt, then an echo, and then another appointment with ADW in June. Explained [facility] appt and medication refills with annual OV. That it has been >1 year since OV with cardiology.   Review of patient symptoms. None to report. Anxiety and worry in the setting of Afib. Requests Echo for reassurance, and for a cardiologist to reivew pictures of her heart.     Informed patient again, for best heart care to keep her OV next week with FLY BROWN for a thorough evaluation. Will make request known, but no promises made. Explained that given it has been >1 year an echo order may not be obtained without OV. Verbalized understanding. FEDERICO,Rn     "

## 2025-04-18 NOTE — TELEPHONE ENCOUNTER
===View-only below this line===  ----- Message -----  From: Matthieu Pendleton PA-C  Sent: 4/18/2025  11:23 AM CDT  To: ROHIT Souza,     No need for routine echo without symptoms.  Would recommend regular annual follow up as scheduled.      Thanks  Jovon

## 2025-04-18 NOTE — TELEPHONE ENCOUNTER
"Dr. Lowe, patient requests an echocardiogram. Have encouraged in person appt as planned for safe med management, heart care and if any alternate testing would be recommended. Again, pt understands but requests an Echocardiogram. Please advise. Antoni CABALLERO  ________________________________________    PC with patient and review of response from Gen cards. Disappointed with response. After discussion, encouraged to follow-up with [facility] as arranged for annual visit, in person evaluation, safe med management and if alternate testing would be advised. Verbal request by patient to ask Dr Llanos for an echo. Having AF and anxiety, she is unsure if her symptoms are coming from the heart or from baseline anxiety. She would like an echocardiogram, if for nothing else than peace of mind. Requests an echocardiogram to assess. No clear symptoms given, but states again, unsure if the symptoms are from anxiety or the heart.  Multiple requests to forward to ADW for a response. Again, informed patient best to have an in person evaluation and follow-up for safe medication management or to see if alternate testing is advised. Again, pt requests to see if \"my doctor, Dr Lowe will order an echo\".   Will forward to provider and return call once advisement obtained.  ANTONI CABALLERO   "

## 2025-05-05 ENCOUNTER — VIRTUAL VISIT (OUTPATIENT)
Dept: FAMILY MEDICINE | Facility: CLINIC | Age: 40
End: 2025-05-05
Payer: COMMERCIAL

## 2025-05-05 DIAGNOSIS — R03.0 ELEVATED BLOOD PRESSURE READING WITHOUT DIAGNOSIS OF HYPERTENSION: Primary | ICD-10-CM

## 2025-05-05 PROCEDURE — 98005 SYNCH AUDIO-VIDEO EST LOW 20: CPT

## 2025-05-05 NOTE — PROGRESS NOTES
Cherie is a 39 year old who is being evaluated via a billable video visit.          Assessment & Plan     Elevated blood pressure reading without diagnosis of hypertension  Patient's BP noted to be elevated in the ED on 4/30/25. It was also elevated at my previous visit with the patient back in March. Discussed diagnosis of HTN again. With this diagnosis of HTN her [id number] 2 score would elevate to 2. A score of 2 or greater would put her into the risk category for consideration of anticoagulation given her history of Afib - I again reviewed this with her and that I think this would be a good conversation to have with cardiology at her upcoming appointment in June. We discussed starting a medication today including the pros/cons. Discussed we have room to increase her verapamil but that this is not necessarily a first line BP medication. We discussed alternative medications such as an ACE or ARB. At this time the patient would like to continue working on her diet and exercise. She will keep a log of her BP and bring this with her to her upcoming cardiology appointment and will further seek their recommendations. The patient is educated if she has any questions or concerns in the mean time to please reach out as I am happy to start a medication as well.       Subjective   Cherie is a 39 year old, presenting for the following health issues:  Recheck Medication (Discussion about beginning blood pressure medication)        5/5/2025    12:13 PM   Additional Questions   Roomed by RUBIO Roach      Recently seen in the ED on 4/30/25 for left arm pain. BP noted to be elevated at that visit at 137/96.     Not routinely monitoring her home BP. Cannot recall most recent readings.     Review of Systems  Constitutional, HEENT, cardiovascular, pulmonary, gi and gu systems are negative, except as otherwise noted.        Objective         Vitals:  No vitals were obtained today due to virtual visit.    Physical Exam    GENERAL: alert and no distress  EYES: Eyes grossly normal to inspection.  No discharge or erythema, or obvious scleral/conjunctival abnormalities.  RESP: No audible wheeze, cough, or visible cyanosis.    SKIN: Visible skin clear. No significant rash, abnormal pigmentation or lesions.  NEURO: Cranial nerves grossly intact.  Mentation and speech appropriate for age.  PSYCH: Appropriate affect, tone, and pace of words    Office Visit on 03/18/2025   Component Date Value Ref Range Status    TSH 03/18/2025 5.20 (H)  0.30 - 4.20 uIU/mL Final    Free T4 03/18/2025 1.04  0.90 - 1.70 ng/dL Final       BP Readings from Last 3 Encounters:   03/18/25 136/90   07/24/24 126/74   04/10/24 138/88         Video-Visit Details    Type of service:  Video Visit   Originating Location (pt. Location): Home    Distant Location (provider location):  On-site  Platform used for Video Visit: Denise  Signed Electronically by: Bianca Ventura PA-C

## 2025-05-12 NOTE — TELEPHONE ENCOUNTER
PC to patient and review of provider response. Agreeable. Order placed. Requests direct transfer to schedulers to arrange.   Of note, pt was recently in the [facility] for left arm pain, and tingling in left hand. Not following the full treatment prescribed by the [facility], as concerned of taking the muscle relaxer might affect her heart. She was reassured that the medications were safe, but not taking. Encouraged to send MCM to provider if specific med questions. CMM,Rn

## 2025-05-12 NOTE — TELEPHONE ENCOUNTER
===View-only below this line===  ----- Message -----  From: Shira Lowe MD  Sent: 5/11/2025   5:15 PM CDT  To: Amanuel Bates RN    Can proceed with TTE prior to upcoming FUV 6/24/2025.    Thank you,  Sam

## 2025-05-20 ENCOUNTER — TELEPHONE (OUTPATIENT)
Dept: CARDIOLOGY | Facility: CLINIC | Age: 40
End: 2025-05-20
Payer: COMMERCIAL

## 2025-05-20 DIAGNOSIS — I48.0 PAROXYSMAL ATRIAL FIBRILLATION (H): ICD-10-CM

## 2025-05-20 RX ORDER — VERAPAMIL HYDROCHLORIDE 120 MG/1
120 TABLET, FILM COATED, EXTENDED RELEASE ORAL AT BEDTIME
Qty: 30 TABLET | Refills: 1 | Status: SHIPPED | OUTPATIENT
Start: 2025-05-20

## 2025-05-20 NOTE — TELEPHONE ENCOUNTER
Pc back to patient.  Questions answered.  Will keep ADW and echo apt as scheduled.  Refill for verapamil sent  JW

## 2025-05-20 NOTE — TELEPHONE ENCOUNTER
[facility] Call Center    Phone Message    May a detailed message be left on voicemail: yes     Reason for Call: Other: please call pt back to discuss who is on her care team and why. Pt does not understand difference in APPvsMD or General vs EP. CHINO tried to explain it to pt several different ways. Pt was not understanding and SAC was unable to answer her questions as they kept changing as SAC answered pt's questions but was not to her satisfaction. Pt thought she had seen Manuel and not Mynor and there is confusion there. It sounds like pt does not want White anymore and was under the understanding Oress was replacing White; which CHINO tried  to explain multiple times is not the case and she needs a MD. Tried to explain it looks like she has the two teams, has been a while since she saw Mynor which could be while this appt was scheduled with Mynor. To CHINO it sounds like pt wants a completely new care team, again nothing CHINO said was satisfactory for pt. Please call pt back to assist as pt is confused.       Action Taken: Other: cardiology     Travel Screening: Not Applicable      Thank you!  [facility]        Date of Service:

## 2025-06-13 ENCOUNTER — HOSPITAL ENCOUNTER (OUTPATIENT)
Dept: CARDIOLOGY | Facility: CLINIC | Age: 40
Discharge: HOME | End: 2025-06-13
Attending: INTERNAL MEDICINE | Admitting: INTERNAL MEDICINE
Payer: COMMERCIAL

## 2025-06-13 DIAGNOSIS — I48.0 PAROXYSMAL ATRIAL FIBRILLATION (H): ICD-10-CM

## 2025-06-13 LAB — LVEF ECHO: NORMAL

## 2025-06-13 PROCEDURE — 93306 TTE W/DOPPLER COMPLETE: CPT

## 2025-06-13 PROCEDURE — 93306 TTE W/DOPPLER COMPLETE: CPT | Mod: 26 | Performed by: INTERNAL MEDICINE

## 2025-06-14 ENCOUNTER — RESULTS FOLLOW-UP (OUTPATIENT)
Dept: CARDIOLOGY | Facility: CLINIC | Age: 40
End: 2025-06-14

## 2025-06-16 DIAGNOSIS — I48.0 PAROXYSMAL ATRIAL FIBRILLATION (H): Primary | ICD-10-CM

## 2025-06-24 ENCOUNTER — OFFICE VISIT (OUTPATIENT)
Dept: CARDIOLOGY | Facility: CLINIC | Age: 40
End: 2025-06-24
Payer: COMMERCIAL

## 2025-06-24 VITALS
BODY MASS INDEX: 47.34 KG/M2 | HEART RATE: 72 BPM | DIASTOLIC BLOOD PRESSURE: 84 MMHG | RESPIRATION RATE: 14 BRPM | SYSTOLIC BLOOD PRESSURE: 136 MMHG | WEIGHT: 281 LBS

## 2025-06-24 DIAGNOSIS — I48.0 PAROXYSMAL ATRIAL FIBRILLATION (H): Primary | ICD-10-CM

## 2025-06-24 PROCEDURE — 99215 OFFICE O/P EST HI 40 MIN: CPT | Performed by: INTERNAL MEDICINE

## 2025-06-24 PROCEDURE — 3079F DIAST BP 80-89 MM HG: CPT | Performed by: INTERNAL MEDICINE

## 2025-06-24 PROCEDURE — G2211 COMPLEX E/M VISIT ADD ON: HCPCS | Performed by: INTERNAL MEDICINE

## 2025-06-24 PROCEDURE — 3075F SYST BP GE 130 - 139MM HG: CPT | Performed by: INTERNAL MEDICINE

## 2025-06-24 NOTE — LETTER
2025    Bianca Ventura PA-C  [address] [place] [address]    RE: Cherie BARRERA Lesiafrances       Dear Colleague,     I had the pleasure of seeing Cherie Montgomery in the [facility].     [facility]  Cardiac Electrophysiology  [address]  [place] [address]   Office: [phone]  Fax: [phone]     Patient: Cherie Montgomery   : 1985     CHIEF COMPLAINT/REASON FOR VISIT  Paroxysmal atrial fibrillation    Assessment/Recommendations     Paroxysmal atrial fibrillation - symptomatic with palpitations.  She is quite anxious.  [id number] 0-1  We reviewed atrial fibrillation physiology and management considerations including managing stroke risk, rate control, antiarrhythmic drug therapy, and catheter ablation.    We will aim to further characterize her symptoms and establish symptom-arrhythmia correlation prior to consolidating a treatment plan  - Zio monitoring, 14d, mail out  - if AF, further consideration for suppression strategy including ablation vs suppressive AAD  - continue flecainide 150mg once daily in case of atrial fibrillation episodes for now  - continue verapamil SR 120mg daily for now  - given her [id number] 0-1, long term therapeutic anticoagulation is not indicated  - we discussed the ongoing importance of lifestyle modification (maintaining a healthy weight, sleep apnea diagnosis and management, alcohol avoidance) as part of a long term strategy for atrial fibrillation management  - the longitudinal plan of care was addressed during this visit. Due to the added complexity in care, our team will remain engaged subsequent management of this condition and ongoing continuity of care    Palpitations - unclear if associated with atrial fibrillation  - Zio monitoring 14d, mail out prior to committing to strategy    Anxiety  - I suggested that she consider connecting with a primary care provider, and possibly someone regarding anxiety  management    Follow up: follow-up plan and timing pending Zio results         History of Present Illness   Cherie Montgomery is a 40 year old female with paroxysmal atrial fibrillation, APLAS, hypothyroidism, obesity presenting for follow-up regarding atrial fibrillation.    Mrs. Montgomery notes a first episode of atrial fibrillation around 2021, a second episode 7/15/2022, and a third episode 12/18/2022 - she has needed cardioversion 7/15/2022 and 12/18/2022.  Her longest episode to date prior to DCCV was ~6hrs.  She has had All occurrences have occurred at night and awaken her from sleep.  She notes isolated palpitations and brief episodes of tachycardia - she has taken flecainide as needed.  She reports taking this 1-3 times per week since 3/2025.    She denies chest pain, syncope.  She has two young children.  She works in a salon.  She is very tearful today and notes significant anxiety, dread regarding her cardiac issues and notes some acute active life stressors as well.       Physical Examination  Review of Systems   VITALS: /84 (BP Location: Left arm, Patient Position: Sitting, Cuff Size: Adult Large)   Pulse 72   Resp 14   Wt 127.5 kg (281 lb)   LMP  (LMP Unknown)   BMI 47.34 kg/m    Wt Readings from Last 3 Encounters:   03/18/25 133.4 kg (294 lb)   07/24/24 133.7 kg (294 lb 12.8 oz)   04/10/24 129.7 kg (286 lb)     CONSTITUTIONAL: well nourished, comfortable, no distress  EYES:  Conjunctivae pink, sclerae clear.    E/N/T:  Oral mucosa pink  RESPIRATORY:  Respiratory effort is normal  CARDIOVASCULAR:  normal S1 and S2  GASTROINTESTINAL:  Abdomen without masses or tenderness  EXTREMITIES:  No clubbing or cyanosis.    MUSCULOSKELETAL:  Overall grossly normal muscle strength  SKIN:  Overall, skin warm and dry, no lesions.  NEURO/PSYCH:  Oriented x 3 with normal affect.     Constitutional:  No weight loss or loss of appetite    Eyes:  No difficulty with vision, no double vision, no dry eyes  ENT:  No  sore throat, difficulty swallowing; changes in hearing or tinnitus  Cardiovascular: As detailed above  Respiratory:  No cough  Musculoskeletal  No joint pain, muscle aches  Neurologic:  No syncope, lightheadedness, fainting spells   Hematologic: No easy bruising, excessive bleeding tendency   Gastrointestinal:  No jaundice, abdominal pain or abdominal bloating  Genitourinary: No changes in urinary habits, no trouble urinating    Psychiatric: No anxiety or depression      Medical History  Surgical History   Past Medical History:   Diagnosis Date     Anti-phospholipid antibody syndrome      Atrial fibrillation (H)      Dysmenorrhea      Hypertension     with second full term pregnancy     Obese      Thyroid disease     hypothyroid    Past Surgical History:   Procedure Laterality Date     DILATION AND CURETTAGE N/A 2018    Procedure: SUCTION DILATION AND CURETTAGE;  Surgeon: Roxanna Moody MD;  Location: [facility];  Service:      DILATION AND CURETTAGE, OPERATIVE HYSTEROSCOPY WITH MORCELLATOR, COMBINED N/A 2019    Procedure: HYSTEROSCOPY, POLYPECTOMY, POSSIBLE DILATION AND CURETTAGE (GUY NEPHCoub MORCELLATOR AND FLUID MANAGEMENT);  Surgeon: Katia Rm MD;  Location:  OR     EXCISION, EXOSTOSIS, RETROCALCANEAL Left 2023    Procedure: Excision retrocalcaneal spur left foot;  Surgeon: Jomar Painter DPM;  Location: [facility]     GYN SURGERY      D & C     TOE SURGERY           Family History Social History   Family History   Problem Relation Age of Onset     Bipolar Disorder Other      Depression Other      Alcoholism Other      Attention Deficit Disorder Other         Social History     Tobacco Use     Smoking status: Former     Current packs/day: 0.00     Types: Cigarettes     Quit date: 2018     Years since quittin.4     Passive exposure: Past     Smokeless tobacco: Never   Vaping Use     Vaping status: Never Used   Substance Use Topics     Alcohol use: Not  "Currently     Comment: 2/month     Drug use: Never         Medications  Allergies     Current Outpatient Medications:      aspirin 81 MG EC tablet, Take 81 mg by mouth daily (Patient not taking: Reported on 5/5/2025), Disp: , Rfl:      flecainide (TAMBOCOR) 150 MG tablet, TAKE 1 TABLET(150 MG) BY MOUTH DAILY AS NEEDED FOR ATRIAL FIBRILLATION, Disp: 20 tablet, Rfl: 1     levothyroxine (SYNTHROID/LEVOTHROID) 50 MCG tablet, Take 1 tablet (50 mcg) by mouth every morning (before breakfast)., Disp: 90 tablet, Rfl: 3     verapamil ER (CALAN-SR) 120 MG CR tablet, Take 1 tablet (120 mg) by mouth at bedtime., Disp: 30 tablet, Rfl: 1   No Known Allergies       Lab Results    Chemistry CBC Cardiac Enzymes/BNP/TSH/INR   Recent Labs   Lab Test 01/29/24  1136      POTASSIUM 4.7   CHLORIDE 103   CO2 28   GLC 99   BUN 14.2   CR 0.76   GFRESTIMATED >90   BRIEN 9.4     Recent Labs   Lab Test 01/29/24  1136 02/14/23  0338 12/18/22  0824   CR 0.76 0.78 0.77          Recent Labs   Lab Test 02/14/23  0338   WBC 11.9*   HGB 13.5   HCT 39.8   MCV 88        Recent Labs   Lab Test 02/14/23  0338 12/18/22  0824 07/15/22  0219   HGB 13.5 14.2 13.7    Recent Labs   Lab Test 02/14/23  0642 02/14/23  0338 07/15/22  0219   TROPONINI <0.01 <0.01 <0.01     No results for input(s): \"BNP\", \"NTBNPI\", \"NTBNP\" in the last 25360 hours.  Recent Labs   Lab Test 03/18/25  0952   TSH 5.20*     Recent Labs   Lab Test 08/12/20  1106   INR 0.99         Data Review    ECGs (tracings independently reviewed)  2/13/2023 - SR 96bpm, MI 156ms, QRS 90ms, QTC 452ms  12/18/2022 - SR 76bpm  12/18/2022 - AF, ventricular rate 157bpm  7/15/2022 - AF, ventricular rate 150bpm    Zio monitoring from 1/22/2023 to 2/4/2023 (duration 12d 18h).  Predominant underlying rhythm was sinus rhythm, 50 to 134bpm, average 85bpm.  7 episodes of nonsustained supraventricular tachycardia - fastest 9.0s maximum 222bpm, longest 15.7s average 124bpm.  These appear to be rapid atrial " tachycardia.  No sustained tachyarrhythmias.  No atrial fibrillation.  There were no pauses of greater than 3 seconds.  Rare supraventricular ectopic beats (<1%).  Rare premature ventricular contractions (<1%).  Symptom triggers correlated to sinus rhythm with and without SVEs, nonsustained SVT, VEs.    Cardiac event monitoring from 7/25/2022 to 8/3/2022 (monitored duration 3d 22h 38m) (independently reviewed)  Baseline rhythm was sinus rhythm 78bpm.    Reported heart rate range 50 to 120bpm, average 74bpm.  2 symptom triggers (other, palpitations) correlated to sinus rhythm 69-87bpm.  No nonsustained or sustained tachyarrhythmias.  No atrial fibrillation.  There were no pauses noted.  Supraventricular and ventricular ectopic beat frequency are not reported on this monitoring modality.      6/13/2025 TTE  The left ventricle is normal in size.  The visual ejection fraction is 60-65%.  Regional wall motion is grossly normal but endocardial border definition is  limited  No hemodynamically significant valvular abnormalities on 2D or color flow  imaging.  There is no comparison study available.    1/24/2023 MPI     The nuclear stress test is negative for inducible myocardial ischemia or infarction.     The left ventricular ejection fraction at stress is 70%.     The patient is at a low risk of future cardiac ischemic events.     The  images demonstrate breast attenuation.     There is no prior study for comparison.         Cc: Saran Vargas MD, Katia Rm MD Amila Dilusha William, MD  6/24/2025  9:47 AM          Thank you for allowing me to participate in the care of your patient.      Sincerely,     Shira Lowe MD     [facility]  cc:   Shira Lowe MD  [address]  [place] [address]

## 2025-06-24 NOTE — PATIENT INSTRUCTIONS
[facility]  Cardiac Electrophysiology  [address]  [place] [address]   Office: [phone]  Fax: [phone]       Thank you for seeing us in clinic today - it is a pleasure to be a part of your care team.  Below is a summary of our plan from today's visit.       You have paroxysmal atrial fibrillation and have more recently had some palpitations which are of indeterminate cause.      We will plan for the following:  - Zio monitoring to further characterize your palpitations  - flecainide 150mg once daily in case of atrial fibrillation episodes  - continue verapamil SR 120mg daily  - continue to work on healthy lifestyles and aerobic activity  - consider connecting with a primary care provider, and possibly someone regarding anxiety management     Please do not hesitate to be in touch with our office at [phone] with any questions that may arise.       Thank you for trusting us with your care,    Shira Lowe MD  Clinical Cardiac Electrophysiology  [facility]  [address]  [place] [address]   Office: [phone]  Fax: [phone]              ATRIAL FIBRILLATION: Patient Information    What is atrial fibrillation?  Atrial fibrillation (AF, A-fib) is a common heart rhythm problem (arrhythmia) occurring within the upper chambers of the heart (the atria).  In normal rhythm, the upper and lower chambers of the heart are electrically driven to contract in a coordinated sequence.  In atrial fibrillation, the atria lose their ability to contract because of rapid and chaotic electrical activity.  The lower chambers of the heart (the ventricles) continue to pump blood throughout the body, though with irregular and often faster rate due to the chaotic activity within the atria.        How do I know if I have atrial fibrillation?   Some people may feel their heart beating faster, harder, or irregularly while in atrial  fibrillation.  Others may be lightheaded, fatigued, feel weak or tired or become more short of breath especially with activities.  Some patients have no symptoms at all.  Atrial fibrillation may be found due to an irregular pulse or on an electrocardiogram (ECG). Atrial fibrillation can start and stop on its own, and episodes can last from seconds to several months.      How common is atrial fibrillation?   An estimated 3-6 million people in the [place] have atrial fibrillation.  Atrial fibrillation is a common heart rhythm problem for older persons, affecting as estimated 12-15% of people over the age of 65 years of age.    What causes atrial fibrillation?   Age is the most important risk factor for atrial fibrillation.  Atrial fibrillation is more common in people with other heart disease, high blood pressure, diabetes, obesity, sleep apnea and in people who regularly consume alcohol.  Surgery, lung disease, or thyroid problems can lead to atrial fibrillation.  Atrial fibrillation has multiple possible causes, and in most cases a single cause cannot be found.  Atrial fibrillation is a progressive condition, usually starting with at an early stage with short and infrequent episodes.  In later stages of disease, more frequent and longer lasting episodes of atrial fibrillation occur, ultimately culminating in episodes which do not spontaneously terminate.  Generally, more enlargement and scarring within the upper chambers of the heart is observed as atrial fibrillation progresses from early to late-stage disease.    How is atrial fibrillation diagnosed and evaluated?    Because of its start-stop nature, atrial fibrillation can be challenging to diagnose.  Atrial fibrillation is most commonly diagnosed via cardiac rhythm recordings - either an ECG or wearable cardiac rhythm monitor.  For patients with pacemakers, defibrillators or implantable loop recorders, atrial fibrillation may be recorded via these devices.   Recently, commercially available devices (eg. Apple Watch, RunSignUp.com device, certain FitBit devices, others) can allow patients to take 30 second cardiac rhythm recordings which may document atrial fibrillation.  Once atrial fibrillation is diagnosed, additional tests include blood tests and an echocardiogram.  The echocardiogram uses ultrasound to look at your heart to assess your cardiac function and evaluate for other heart disease.  Additional evaluation may include CT or MRI studies.    Is atrial fibrillation dangerous?   Atrial fibrillation is not usually a life-threatening arrhythmia.  The most serious consequences of atrial fibrillation including stroke and worsening of overall cardiac function.  While in atrial fibrillation, the upper cardiac chambers do not contract normally, resulting in slower blood flow and increased risk of clot formation.  If this blood clot becomes detached from the heart a stroke can occur.  Unfortunately, stroke can be the first sign of atrial fibrillation for some people.  With a stroke, you may notice abnormal sensation, weakness on one side of the body or face, changes in your vision or speech.  If you have any of these signs, you should contact EMS and be evaluated in an emergency room as soon as possible.      How is atrial fibrillation treated?     Several treatment options exist for suppressing atrial fibrillation - however, it is not an easily curable arrhythmia.  The first goal in managing atrial fibrillation is to minimize stroke risk.  The second goal is to improve symptoms associated with atrial fibrillation.  Finally, in patients with reduced cardiac function, maintaining normal rhythm can help improve cardiac function.      Blood thinners are used to reduce the risk of stroke in patients with high estimated stroke risk related to atrial fibrillation.  For patients at higher risk of bleeding related to blood thinner use, implantable devices may be an option to reduce  stroke risk without the need for long term blood thinner use.      Atrial fibrillation can be managed via two strategies: rate control and rhythm control.  In a rate control strategy, continued atrial fibrillation is accepted and medications (eg. beta-blockers or calcium channel blockers) are used to control the lower chamber rate.  In a rhythm control strategy, anti-arrhythmic medications or catheter ablation are used to maintain normal cardiac rhythm and slow disease progression by suppressing atrial fibrillation.  A procedure called a cardioversion, in which an electric shock is delivered through patches placed on the chest wall while under deep sedation, can be performed to temporarily restore normal cardiac rhythm, though does not address the chance of atrial fibrillation recurrence.  Treatments are more effective for earlier rather than later stage atrial fibrillation.  Lifestyle modifications (maintaining a healthy weight, aerobic exercise, diagnosing and treating sleep apnea, and minimizing alcohol intake) are important elements of atrial fibrillation rhythm control.     What is catheter ablation for atrial fibrillation?  Cardiac catheter ablation is a commonly performed, minimally invasive procedure performed by a cardiac electrophysiologist to treat many different cardiac rhythm abnormalities.  During catheter ablation, long, thin, flexible tubes are advanced into the heart via small sheaths inserted into the femoral veins and thermal energy (either heating or cooling) is applied within the heart to disrupt abnormal electrical activity.  Atrial fibrillation ablation is performed under general anesthesia, with procedures generally taking approximately 2-3 hours.  Patients are typically observed for 3-5 hours after the ablation, and in most cases can be discharged home the same day.  Atrial fibrillation ablation is associated with better outcomes (mortality, cardiovascular hospitalizations, atrial arrhythmia  recurrences) compared to antiarrhythmic drug therapy.  However, atrial fibrillation recurrences are not uncommon, and repeat catheter ablation may be offered.  Your electrophysiology team can review atrial fibrillation ablation, anticipated success rates, risks, and recovery expectations with you.    What are anti-arrhythmic medications?  Anti-arrhythmic medications are specialized drugs which alter cardiac electrical functioning to suppress arrhythmias.  There are several anti-arrhythmic medications available, each with its own success rate and side effects.  Some anti-arrhythmic medications are less effective though safer to use, others are more effective though have serious potential toxicities.  Atrial fibrillation recurrences are common and may require dose adjustment or change in antiarrhythmic therapy.  Your electrophysiology team will carefully consider which medication would be the best and safest for your particular case.      Can I live a normal life?    The goal of atrial fibrillation management is for patients to live normal lives without being limited by symptoms related to atrial fibrillation.    Are any additional educational resources available?  There are a number of excellent atrial fibrillation education resources available to you online.  A few options you may wish to review include:  hrsonline.org/guide-atrial-fibrillation  afibmatters.org  getsmartaboutafib.com  stopaf.com    What comes next?    Consider your management options and let us know how we can help in your decision process.  Please take medications as they have been prescribed.  You should also get any tests that may have been ordered for you.      When to Call Your Doctor or seek emergency care:  Call your doctor or seek emergency care if you have any significant changes with the following:  Weakness  Dizziness  Fainting  Fatigue  Shortness of breath  Chest pain with increased activity  If you are concerned that your heart rate is  too fast or too slow  Bleeding that does not stop in 10 minutes  Coughing or throwing up blood  Bloody diarrhea or bleeding hemorrhoids  Dark-colored urine or black stool  Allergic reactions:  Rash  Itching  Swelling  Trouble breathing or swallowing      Please call the [facility] at [phone] if you have concerns about your symptoms, your medicines, or your follow-up appointments.

## 2025-06-24 NOTE — PROGRESS NOTES
[facility]  Cardiac Electrophysiology  [address]  [place] [address]   Office: [phone]  Fax: [phone]     Patient: Cherie Montgomery   : 1985     CHIEF COMPLAINT/REASON FOR VISIT  Paroxysmal atrial fibrillation    Assessment/Recommendations     Paroxysmal atrial fibrillation - symptomatic with palpitations.  She is quite anxious.  [id number] 0-1  We reviewed atrial fibrillation physiology and management considerations including managing stroke risk, rate control, antiarrhythmic drug therapy, and catheter ablation.    We will aim to further characterize her symptoms and establish symptom-arrhythmia correlation prior to consolidating a treatment plan  - Zio monitoring, 14d, mail out  - if AF, further consideration for suppression strategy including ablation vs suppressive AAD  - continue flecainide 150mg once daily in case of atrial fibrillation episodes for now  - continue verapamil SR 120mg daily for now  - given her [id number] 0-1, long term therapeutic anticoagulation is not indicated  - we discussed the ongoing importance of lifestyle modification (maintaining a healthy weight, sleep apnea diagnosis and management, alcohol avoidance) as part of a long term strategy for atrial fibrillation management  - the longitudinal plan of care was addressed during this visit. Due to the added complexity in care, our team will remain engaged subsequent management of this condition and ongoing continuity of care    Palpitations - unclear if associated with atrial fibrillation  - Zio monitoring 14d, mail out prior to committing to strategy    Anxiety  - I suggested that she consider connecting with a primary care provider, and possibly someone regarding anxiety management    Follow up: follow-up plan and timing pending Zio results         History of Present Illness   Cherie Montgomery is a 40 year old female with paroxysmal atrial fibrillation, APLAS, hypothyroidism,  obesity presenting for follow-up regarding atrial fibrillation.    Mrs. Montgomery notes a first episode of atrial fibrillation around 2021, a second episode 7/15/2022, and a third episode 12/18/2022 - she has needed cardioversion 7/15/2022 and 12/18/2022.  Her longest episode to date prior to DCCV was ~6hrs.  She has had All occurrences have occurred at night and awaken her from sleep.  She notes isolated palpitations and brief episodes of tachycardia - she has taken flecainide as needed.  She reports taking this 1-3 times per week since 3/2025.    She denies chest pain, syncope.  She has two young children.  She works in a salon.  She is very tearful today and notes significant anxiety, dread regarding her cardiac issues and notes some acute active life stressors as well.       Physical Examination  Review of Systems   VITALS: /84 (BP Location: Left arm, Patient Position: Sitting, Cuff Size: Adult Large)   Pulse 72   Resp 14   Wt 127.5 kg (281 lb)   LMP  (LMP Unknown)   BMI 47.34 kg/m    Wt Readings from Last 3 Encounters:   03/18/25 133.4 kg (294 lb)   07/24/24 133.7 kg (294 lb 12.8 oz)   04/10/24 129.7 kg (286 lb)     CONSTITUTIONAL: well nourished, comfortable, no distress  EYES:  Conjunctivae pink, sclerae clear.    E/N/T:  Oral mucosa pink  RESPIRATORY:  Respiratory effort is normal  CARDIOVASCULAR:  normal S1 and S2  GASTROINTESTINAL:  Abdomen without masses or tenderness  EXTREMITIES:  No clubbing or cyanosis.    MUSCULOSKELETAL:  Overall grossly normal muscle strength  SKIN:  Overall, skin warm and dry, no lesions.  NEURO/PSYCH:  Oriented x 3 with normal affect.     Constitutional:  No weight loss or loss of appetite    Eyes:  No difficulty with vision, no double vision, no dry eyes  ENT:  No sore throat, difficulty swallowing; changes in hearing or tinnitus  Cardiovascular: As detailed above  Respiratory:  No cough  Musculoskeletal  No joint pain, muscle aches  Neurologic:  No syncope,  lightheadedness, fainting spells   Hematologic: No easy bruising, excessive bleeding tendency   Gastrointestinal:  No jaundice, abdominal pain or abdominal bloating  Genitourinary: No changes in urinary habits, no trouble urinating    Psychiatric: No anxiety or depression      Medical History  Surgical History   Past Medical History:   Diagnosis Date    Anti-phospholipid antibody syndrome     Atrial fibrillation (H)     Dysmenorrhea     Hypertension     with second full term pregnancy    Obese     Thyroid disease     hypothyroid    Past Surgical History:   Procedure Laterality Date    DILATION AND CURETTAGE N/A 2018    Procedure: SUCTION DILATION AND CURETTAGE;  Surgeon: Roxanna Moody MD;  Location: [facility];  Service:     DILATION AND CURETTAGE, OPERATIVE HYSTEROSCOPY WITH MORCELLATOR, COMBINED N/A 2019    Procedure: HYSTEROSCOPY, POLYPECTOMY, POSSIBLE DILATION AND CURETTAGE ([employer] NEPHCorrelsense MORCELLATOR AND FLUID MANAGEMENT);  Surgeon: Katia Rm MD;  Location:  OR    EXCISION, EXOSTOSIS, RETROCALCANEAL Left 2023    Procedure: Excision retrocalcaneal spur left foot;  Surgeon: Jomar Painter DPM;  Location: [facility]    GYN SURGERY      D & C    TOE SURGERY           Family History Social History   Family History   Problem Relation Age of Onset    Bipolar Disorder Other     Depression Other     Alcoholism Other     Attention Deficit Disorder Other         Social History     Tobacco Use    Smoking status: Former     Current packs/day: 0.00     Types: Cigarettes     Quit date: 2018     Years since quittin.4     Passive exposure: Past    Smokeless tobacco: Never   Vaping Use    Vaping status: Never Used   Substance Use Topics    Alcohol use: Not Currently     Comment: 2/month    Drug use: Never         Medications  Allergies     Current Outpatient Medications:     aspirin 81 MG EC tablet, Take 81 mg by mouth daily (Patient not taking: Reported on 2025), Disp: ,  "Rfl:     flecainide (TAMBOCOR) 150 MG tablet, TAKE 1 TABLET(150 MG) BY MOUTH DAILY AS NEEDED FOR ATRIAL FIBRILLATION, Disp: 20 tablet, Rfl: 1    levothyroxine (SYNTHROID/LEVOTHROID) 50 MCG tablet, Take 1 tablet (50 mcg) by mouth every morning (before breakfast)., Disp: 90 tablet, Rfl: 3    verapamil ER (CALAN-SR) 120 MG CR tablet, Take 1 tablet (120 mg) by mouth at bedtime., Disp: 30 tablet, Rfl: 1   No Known Allergies       Lab Results    Chemistry CBC Cardiac Enzymes/BNP/TSH/INR   Recent Labs   Lab Test 01/29/24  1136      POTASSIUM 4.7   CHLORIDE 103   CO2 28   GLC 99   BUN 14.2   CR 0.76   GFRESTIMATED >90   BRIEN 9.4     Recent Labs   Lab Test 01/29/24  1136 02/14/23  0338 12/18/22  0824   CR 0.76 0.78 0.77          Recent Labs   Lab Test 02/14/23  0338   WBC 11.9*   HGB 13.5   HCT 39.8   MCV 88        Recent Labs   Lab Test 02/14/23  0338 12/18/22  0824 07/15/22  0219   HGB 13.5 14.2 13.7    Recent Labs   Lab Test 02/14/23  0642 02/14/23  0338 07/15/22  0219   TROPONINI <0.01 <0.01 <0.01     No results for input(s): \"BNP\", \"NTBNPI\", \"NTBNP\" in the last 97659 hours.  Recent Labs   Lab Test 03/18/25  0952   TSH 5.20*     Recent Labs   Lab Test 08/12/20  1106   INR 0.99         Data Review    ECGs (tracings independently reviewed)  2/13/2023 - SR 96bpm, WV 156ms, QRS 90ms, QTC 452ms  12/18/2022 - SR 76bpm  12/18/2022 - AF, ventricular rate 157bpm  7/15/2022 - AF, ventricular rate 150bpm    Zio monitoring from 1/22/2023 to 2/4/2023 (duration 12d 18h).  Predominant underlying rhythm was sinus rhythm, 50 to 134bpm, average 85bpm.  7 episodes of nonsustained supraventricular tachycardia - fastest 9.0s maximum 222bpm, longest 15.7s average 124bpm.  These appear to be rapid atrial tachycardia.  No sustained tachyarrhythmias.  No atrial fibrillation.  There were no pauses of greater than 3 seconds.  Rare supraventricular ectopic beats (<1%).  Rare premature ventricular contractions (<1%).  Symptom triggers " correlated to sinus rhythm with and without SVEs, nonsustained SVT, VEs.    Cardiac event monitoring from 7/25/2022 to 8/3/2022 (monitored duration 3d 22h 38m) (independently reviewed)  Baseline rhythm was sinus rhythm 78bpm.    Reported heart rate range 50 to 120bpm, average 74bpm.  2 symptom triggers (other, palpitations) correlated to sinus rhythm 69-87bpm.  No nonsustained or sustained tachyarrhythmias.  No atrial fibrillation.  There were no pauses noted.  Supraventricular and ventricular ectopic beat frequency are not reported on this monitoring modality.      6/13/2025 TTE  The left ventricle is normal in size.  The visual ejection fraction is 60-65%.  Regional wall motion is grossly normal but endocardial border definition is  limited  No hemodynamically significant valvular abnormalities on 2D or color flow  imaging.  There is no comparison study available.    1/24/2023 MPI    The nuclear stress test is negative for inducible myocardial ischemia or infarction.    The left ventricular ejection fraction at stress is 70%.    The patient is at a low risk of future cardiac ischemic events.    The  images demonstrate breast attenuation.    There is no prior study for comparison.         Cc: Saran Vargas MD, Katia Rm MD Amila Dilusha William, MD  6/24/2025  9:47 AM

## 2025-06-25 ENCOUNTER — ORDERS ONLY (AUTO-RELEASED) (OUTPATIENT)
Dept: CARDIOLOGY | Facility: CLINIC | Age: 40
End: 2025-06-25
Payer: COMMERCIAL

## 2025-06-25 DIAGNOSIS — I48.0 PAROXYSMAL ATRIAL FIBRILLATION (H): ICD-10-CM

## 2025-07-02 ENCOUNTER — MYC MEDICAL ADVICE (OUTPATIENT)
Dept: CARDIOLOGY | Facility: CLINIC | Age: 40
End: 2025-07-02
Payer: COMMERCIAL

## 2025-07-21 ENCOUNTER — MYC REFILL (OUTPATIENT)
Dept: CARDIOLOGY | Facility: CLINIC | Age: 40
End: 2025-07-21
Payer: COMMERCIAL

## 2025-07-21 DIAGNOSIS — I48.0 PAROXYSMAL ATRIAL FIBRILLATION (H): ICD-10-CM

## 2025-07-21 RX ORDER — VERAPAMIL HYDROCHLORIDE 120 MG/1
120 TABLET, FILM COATED, EXTENDED RELEASE ORAL AT BEDTIME
Qty: 30 TABLET | Refills: 1 | Status: SHIPPED | OUTPATIENT
Start: 2025-07-21

## 2025-09-04 ENCOUNTER — MYC MEDICAL ADVICE (OUTPATIENT)
Dept: CARDIOLOGY | Facility: CLINIC | Age: 40
End: 2025-09-04
Payer: COMMERCIAL

## 2025-09-04 DIAGNOSIS — I48.0 PAROXYSMAL ATRIAL FIBRILLATION (H): ICD-10-CM

## 2025-09-04 RX ORDER — FLECAINIDE ACETATE 150 MG/1
150 TABLET ORAL PRN
Qty: 20 TABLET | Refills: 1 | Status: SHIPPED | OUTPATIENT
Start: 2025-09-04

## (undated) DEVICE — TUBING SYS AQUILEX BLUE INFLOW AQL-110 YLW OUTFLOW AQL-111

## (undated) DEVICE — BLADE MORCELLATOR TRUCLEAR INSICOR PLUS 2.9 72202536

## (undated) DEVICE — SOL ADH LIQUID BENZOIN SWAB 0.6ML C1544

## (undated) DEVICE — SOL NACL 0.9% IRRIG 1000ML BOTTLE 2F7124

## (undated) DEVICE — SUCTION CANISTER BEMIS HI FLOW 006772-901

## (undated) DEVICE — Device

## (undated) DEVICE — KIT HYSTEROSCOPIC 7209827

## (undated) DEVICE — SOL WATER IRRIG 1000ML BOTTLE 2F7114

## (undated) DEVICE — TRAY PREP DRY SKIN SCRUB 067

## (undated) DEVICE — PREP CHLORAPREP 26ML TINTED HI-LITE ORANGE 930815

## (undated) DEVICE — DRSG ADAPTIC 3X3" 6112

## (undated) DEVICE — SU MONOCRYL 3-0 PS-2 18" UND MCP497G

## (undated) DEVICE — DRSG STERI STRIP 1/2X4" R1547

## (undated) DEVICE — DRSG GAUZE 4X4" TRAY 6939

## (undated) DEVICE — GLOVE PROTEXIS BLUE W/NEU-THERA 6.5  2D73EB65

## (undated) DEVICE — GOWN XXL 9575

## (undated) DEVICE — DRAPE POUCH IRR 1016

## (undated) DEVICE — GLOVE PROTEXIS W/NEU-THERA 6.0  2D73TE60

## (undated) DEVICE — SYR 10ML FINGER CONTROL W/O NDL 309695

## (undated) DEVICE — SOL NACL 0.9% IRRIG 1000ML BOTTLE 07138-09

## (undated) DEVICE — SUTURE MONOCRYL+ 2-0 27IN CT2 MCP333H

## (undated) DEVICE — GLOVE BIOGEL PI INDICATOR 8.0 LF 41680

## (undated) DEVICE — CATH INTERMITTENT CLEAN-CATH FEMALE 14FR 6" VINYL LF 420614

## (undated) DEVICE — BLADE KNIFE SURG 11 371111

## (undated) DEVICE — GLOVE SURG PI ULTRA TOUCH M SZ 8-1/2 LF

## (undated) DEVICE — PACK TVT HYSTEROSCOPY SMA15HYFSE

## (undated) DEVICE — BLADE MORCELLATOR TRUCLEAR RECIPROCATING 4.0 72203012

## (undated) DEVICE — LINEN TOWEL PACK X5 5464

## (undated) DEVICE — CUSTOM PACK LOWER EXTREMITY SOP5BLEHEA

## (undated) DEVICE — NDL SPINAL 25GA 3" QUINCKE 405170

## (undated) DEVICE — DRSG KERLIX 4 1/2"X4YDS ROLL 6715

## (undated) RX ORDER — FENTANYL CITRATE 50 UG/ML
INJECTION, SOLUTION INTRAMUSCULAR; INTRAVENOUS
Status: DISPENSED
Start: 2019-01-21

## (undated) RX ORDER — DEXAMETHASONE SODIUM PHOSPHATE 4 MG/ML
INJECTION, SOLUTION INTRA-ARTICULAR; INTRALESIONAL; INTRAMUSCULAR; INTRAVENOUS; SOFT TISSUE
Status: DISPENSED
Start: 2019-01-21

## (undated) RX ORDER — OXYCODONE HYDROCHLORIDE 5 MG/1
TABLET ORAL
Status: DISPENSED
Start: 2019-01-21

## (undated) RX ORDER — PROPOFOL 10 MG/ML
INJECTION, EMULSION INTRAVENOUS
Status: DISPENSED
Start: 2023-02-13

## (undated) RX ORDER — POVIDONE-IODINE 10 MG/G
OINTMENT TOPICAL
Status: DISPENSED
Start: 2023-02-13

## (undated) RX ORDER — ONDANSETRON 2 MG/ML
INJECTION INTRAMUSCULAR; INTRAVENOUS
Status: DISPENSED
Start: 2019-01-21

## (undated) RX ORDER — FENTANYL CITRATE 50 UG/ML
INJECTION, SOLUTION INTRAMUSCULAR; INTRAVENOUS
Status: DISPENSED
Start: 2023-02-13

## (undated) RX ORDER — LIDOCAINE HYDROCHLORIDE 20 MG/ML
INJECTION, SOLUTION EPIDURAL; INFILTRATION; INTRACAUDAL; PERINEURAL
Status: DISPENSED
Start: 2019-01-21

## (undated) RX ORDER — PROPOFOL 10 MG/ML
INJECTION, EMULSION INTRAVENOUS
Status: DISPENSED
Start: 2019-01-21

## (undated) RX ORDER — ONDANSETRON 2 MG/ML
INJECTION INTRAMUSCULAR; INTRAVENOUS
Status: DISPENSED
Start: 2023-02-13

## (undated) RX ORDER — DEXAMETHASONE SODIUM PHOSPHATE 10 MG/ML
INJECTION, SOLUTION INTRAMUSCULAR; INTRAVENOUS
Status: DISPENSED
Start: 2023-02-13

## (undated) RX ORDER — BUPIVACAINE HYDROCHLORIDE 5 MG/ML
INJECTION, SOLUTION EPIDURAL; INTRACAUDAL
Status: DISPENSED
Start: 2023-02-13